# Patient Record
Sex: FEMALE | Race: WHITE | NOT HISPANIC OR LATINO | URBAN - METROPOLITAN AREA
[De-identification: names, ages, dates, MRNs, and addresses within clinical notes are randomized per-mention and may not be internally consistent; named-entity substitution may affect disease eponyms.]

---

## 2017-10-13 ENCOUNTER — EMERGENCY (EMERGENCY)
Facility: HOSPITAL | Age: 72
LOS: 1 days | Discharge: LEFT BEFORE DISCHARGE | End: 2017-10-13
Attending: EMERGENCY MEDICINE | Admitting: EMERGENCY MEDICINE
Payer: MEDICARE

## 2017-10-13 VITALS
SYSTOLIC BLOOD PRESSURE: 244 MMHG | OXYGEN SATURATION: 98 % | DIASTOLIC BLOOD PRESSURE: 110 MMHG | RESPIRATION RATE: 18 BRPM | HEART RATE: 50 BPM

## 2017-10-13 VITALS — WEIGHT: 179.9 LBS | HEIGHT: 67 IN

## 2017-10-13 PROCEDURE — 99284 EMERGENCY DEPT VISIT MOD MDM: CPT | Mod: 25

## 2017-10-13 PROCEDURE — 93005 ELECTROCARDIOGRAM TRACING: CPT

## 2017-10-13 PROCEDURE — 93010 ELECTROCARDIOGRAM REPORT: CPT

## 2017-10-13 RX ORDER — SODIUM CHLORIDE 9 MG/ML
3 INJECTION INTRAMUSCULAR; INTRAVENOUS; SUBCUTANEOUS ONCE
Qty: 0 | Refills: 0 | Status: COMPLETED | OUTPATIENT
Start: 2017-10-13 | End: 2017-10-13

## 2017-10-13 NOTE — ED ADULT NURSE NOTE - OBJECTIVE STATEMENT
72 year old female a&ox3 sent from Dr. Avendano at [facility] for high blood pressure. as per pt. she has not taken her bp for x2 weeks because as per her oncologist th 72 year old female a&ox3 sent from Dr. Avendano at [facility] for hypertension as per pt. she has not taken her bp for x2 weeks because as per her oncologist the medications mess up her kidney function. pt. has lung cancer. pt. denies pain or discomfort at this grisel. pt. denies sob, palpitations. pt. refuses iv, blood work, pt. states she did take  levothryoxin and hydralazine while she was here. MD. Jarquin called to bedside. as per MD. we will keep her and monitor her for awhile

## 2017-10-13 NOTE — ED PROVIDER NOTE - PROGRESS NOTE DETAILS
pt refused blood worjk and did not want any meds and will out against medical advice. pt understand the risks of her discharge including death, heart disease and strroke and is taking responsibility for her discharge. pt's  was presents

## 2017-10-13 NOTE — ED STATDOCS - PROGRESS NOTE DETAILS
71 yo F, with hx of metastatic lung CA, presents to ED c/o asymptomatic elevated BP. Pt was at cardiologist for routine visit and was noted to have elevated BP. Denies chest pain, SOB, palpitations, nausea, vomiting, visual changes, and weakness. Focused eval, protocol orders entered. Pt to be moved to main ED for complete evaluation by another provider. 71 yo F, with hx of metastatic lung CA, presents to ED c/o asymptomatic elevated BP. Pt was at cardiologist Dr. Avendano for routine visit and was noted to have elevated BP= systolic 250. Denies chest pain, SOB, palpitations, nausea, vomiting, visual changes, and weakness. Pt states she stopped taking her antihypertensives on her own after finding out her kidney functions were elevated. Denies seeing a nephrologist. Focused eval, protocol orders entered. Pt to be moved to main ED for complete evaluation by another provider.

## 2017-10-13 NOTE — ED PROVIDER NOTE - OBJECTIVE STATEMENT
72 year old female with a h/o htp presents with elevated blood pressure, pt went to see her cardiologist today and was notd to have a systolic blood pressure of 240. pt stopped taking her meds on her own a few days ago after she was told her kidney function was abnormal. pt took 20 mg of hydralizine on the way her

## 2017-10-13 NOTE — ED ADULT NURSE NOTE - PMH
Anxiety    Carcinoma  metastic stage 4 with stephanie to the iliac bone, colon, and lung  Hypertension    Hypothyroid    Iliac crest bone pain  cancer, right

## 2019-02-08 ENCOUNTER — INPATIENT (INPATIENT)
Facility: HOSPITAL | Age: 74
LOS: 8 days | Discharge: HOME | DRG: 246 | End: 2019-02-17
Attending: INTERNAL MEDICINE | Admitting: HOSPITALIST
Payer: MEDICARE

## 2019-02-08 VITALS
WEIGHT: 190.04 LBS | RESPIRATION RATE: 22 BRPM | HEIGHT: 67 IN | SYSTOLIC BLOOD PRESSURE: 170 MMHG | DIASTOLIC BLOOD PRESSURE: 91 MMHG

## 2019-02-08 DIAGNOSIS — I21.4 NON-ST ELEVATION (NSTEMI) MYOCARDIAL INFARCTION: ICD-10-CM

## 2019-02-08 DIAGNOSIS — Z71.89 OTHER SPECIFIED COUNSELING: ICD-10-CM

## 2019-02-08 DIAGNOSIS — J96.01 ACUTE RESPIRATORY FAILURE WITH HYPOXIA: ICD-10-CM

## 2019-02-08 DIAGNOSIS — C34.90 MALIGNANT NEOPLASM OF UNSPECIFIED PART OF UNSPECIFIED BRONCHUS OR LUNG: ICD-10-CM

## 2019-02-08 DIAGNOSIS — J81.0 ACUTE PULMONARY EDEMA: ICD-10-CM

## 2019-02-08 DIAGNOSIS — E03.9 HYPOTHYROIDISM, UNSPECIFIED: ICD-10-CM

## 2019-02-08 LAB
ALBUMIN SERPL ELPH-MCNC: 3.8 G/DL — SIGNIFICANT CHANGE UP (ref 3.3–5.2)
ALP SERPL-CCNC: 87 U/L — SIGNIFICANT CHANGE UP (ref 40–120)
ALT FLD-CCNC: 27 U/L — SIGNIFICANT CHANGE UP
ANION GAP SERPL CALC-SCNC: 11 MMOL/L — SIGNIFICANT CHANGE UP (ref 5–17)
ANION GAP SERPL CALC-SCNC: 12 MMOL/L — SIGNIFICANT CHANGE UP (ref 5–17)
APTT BLD: 28.6 SEC — SIGNIFICANT CHANGE UP (ref 27.5–36.3)
AST SERPL-CCNC: 162 U/L — HIGH
BASOPHILS # BLD AUTO: 0 K/UL — SIGNIFICANT CHANGE UP (ref 0–0.2)
BASOPHILS NFR BLD AUTO: 0.1 % — SIGNIFICANT CHANGE UP (ref 0–2)
BILIRUB SERPL-MCNC: 0.5 MG/DL — SIGNIFICANT CHANGE UP (ref 0.4–2)
BUN SERPL-MCNC: 25 MG/DL — HIGH (ref 8–20)
BUN SERPL-MCNC: 26 MG/DL — HIGH (ref 8–20)
CALCIUM SERPL-MCNC: 9.3 MG/DL — SIGNIFICANT CHANGE UP (ref 8.6–10.2)
CALCIUM SERPL-MCNC: 9.9 MG/DL — SIGNIFICANT CHANGE UP (ref 8.6–10.2)
CHLORIDE SERPL-SCNC: 100 MMOL/L — SIGNIFICANT CHANGE UP (ref 98–107)
CHLORIDE SERPL-SCNC: 101 MMOL/L — SIGNIFICANT CHANGE UP (ref 98–107)
CK MB CFR SERPL CALC: 96.9 NG/ML — HIGH (ref 0–6.7)
CK SERPL-CCNC: 2823 U/L — HIGH (ref 25–170)
CO2 SERPL-SCNC: 21 MMOL/L — LOW (ref 22–29)
CO2 SERPL-SCNC: 23 MMOL/L — SIGNIFICANT CHANGE UP (ref 22–29)
CREAT SERPL-MCNC: 1.05 MG/DL — SIGNIFICANT CHANGE UP (ref 0.5–1.3)
CREAT SERPL-MCNC: 1.09 MG/DL — SIGNIFICANT CHANGE UP (ref 0.5–1.3)
EOSINOPHIL # BLD AUTO: 0 K/UL — SIGNIFICANT CHANGE UP (ref 0–0.5)
EOSINOPHIL NFR BLD AUTO: 0.1 % — SIGNIFICANT CHANGE UP (ref 0–6)
GLUCOSE SERPL-MCNC: 128 MG/DL — HIGH (ref 70–115)
GLUCOSE SERPL-MCNC: 149 MG/DL — HIGH (ref 70–115)
HCT VFR BLD CALC: 41.5 % — SIGNIFICANT CHANGE UP (ref 37–47)
HGB BLD-MCNC: 13.5 G/DL — SIGNIFICANT CHANGE UP (ref 12–16)
INR BLD: 1.04 RATIO — SIGNIFICANT CHANGE UP (ref 0.88–1.16)
LIDOCAIN IGE QN: 13 U/L — LOW (ref 22–51)
LYMPHOCYTES # BLD AUTO: 0.8 K/UL — LOW (ref 1–4.8)
LYMPHOCYTES # BLD AUTO: 5.5 % — LOW (ref 20–55)
MCHC RBC-ENTMCNC: 29.3 PG — SIGNIFICANT CHANGE UP (ref 27–31)
MCHC RBC-ENTMCNC: 32.5 G/DL — SIGNIFICANT CHANGE UP (ref 32–36)
MCV RBC AUTO: 90 FL — SIGNIFICANT CHANGE UP (ref 81–99)
MONOCYTES # BLD AUTO: 1.1 K/UL — HIGH (ref 0–0.8)
MONOCYTES NFR BLD AUTO: 7.4 % — SIGNIFICANT CHANGE UP (ref 3–10)
NEUTROPHILS # BLD AUTO: 12.8 K/UL — HIGH (ref 1.8–8)
NEUTROPHILS NFR BLD AUTO: 86.4 % — HIGH (ref 37–73)
PLATELET # BLD AUTO: 331 K/UL — SIGNIFICANT CHANGE UP (ref 150–400)
POTASSIUM SERPL-MCNC: 5.1 MMOL/L — SIGNIFICANT CHANGE UP (ref 3.5–5.3)
POTASSIUM SERPL-MCNC: 5.5 MMOL/L — HIGH (ref 3.5–5.3)
POTASSIUM SERPL-SCNC: 5.1 MMOL/L — SIGNIFICANT CHANGE UP (ref 3.5–5.3)
POTASSIUM SERPL-SCNC: 5.5 MMOL/L — HIGH (ref 3.5–5.3)
PROT SERPL-MCNC: 7.9 G/DL — SIGNIFICANT CHANGE UP (ref 6.6–8.7)
PROTHROM AB SERPL-ACNC: 12 SEC — SIGNIFICANT CHANGE UP (ref 10–12.9)
RBC # BLD: 4.61 M/UL — SIGNIFICANT CHANGE UP (ref 4.4–5.2)
RBC # FLD: 15.6 % — SIGNIFICANT CHANGE UP (ref 11–15.6)
SODIUM SERPL-SCNC: 133 MMOL/L — LOW (ref 135–145)
SODIUM SERPL-SCNC: 135 MMOL/L — SIGNIFICANT CHANGE UP (ref 135–145)
TROPONIN T SERPL-MCNC: 2.04 NG/ML — HIGH (ref 0–0.06)
WBC # BLD: 14.8 K/UL — HIGH (ref 4.8–10.8)
WBC # FLD AUTO: 14.8 K/UL — HIGH (ref 4.8–10.8)

## 2019-02-08 PROCEDURE — 99291 CRITICAL CARE FIRST HOUR: CPT

## 2019-02-08 PROCEDURE — 71045 X-RAY EXAM CHEST 1 VIEW: CPT | Mod: 26

## 2019-02-08 PROCEDURE — 71275 CT ANGIOGRAPHY CHEST: CPT | Mod: 26

## 2019-02-08 PROCEDURE — 93010 ELECTROCARDIOGRAM REPORT: CPT

## 2019-02-08 PROCEDURE — 99223 1ST HOSP IP/OBS HIGH 75: CPT

## 2019-02-08 PROCEDURE — 72110 X-RAY EXAM L-2 SPINE 4/>VWS: CPT | Mod: 26

## 2019-02-08 PROCEDURE — 74174 CTA ABD&PLVS W/CONTRAST: CPT | Mod: 26

## 2019-02-08 PROCEDURE — 99285 EMERGENCY DEPT VISIT HI MDM: CPT

## 2019-02-08 RX ORDER — SERTRALINE 25 MG/1
100 TABLET, FILM COATED ORAL DAILY
Qty: 0 | Refills: 0 | Status: DISCONTINUED | OUTPATIENT
Start: 2019-02-08 | End: 2019-02-08

## 2019-02-08 RX ORDER — TICAGRELOR 90 MG/1
90 TABLET ORAL
Qty: 0 | Refills: 0 | Status: DISCONTINUED | OUTPATIENT
Start: 2019-02-08 | End: 2019-02-17

## 2019-02-08 RX ORDER — HYDROMORPHONE HYDROCHLORIDE 2 MG/ML
4 INJECTION INTRAMUSCULAR; INTRAVENOUS; SUBCUTANEOUS EVERY 6 HOURS
Qty: 0 | Refills: 0 | Status: DISCONTINUED | OUTPATIENT
Start: 2019-02-08 | End: 2019-02-12

## 2019-02-08 RX ORDER — FUROSEMIDE 40 MG
40 TABLET ORAL EVERY 12 HOURS
Qty: 0 | Refills: 0 | Status: DISCONTINUED | OUTPATIENT
Start: 2019-02-08 | End: 2019-02-10

## 2019-02-08 RX ORDER — NIFEDIPINE 30 MG
90 TABLET, EXTENDED RELEASE 24 HR ORAL DAILY
Qty: 0 | Refills: 0 | Status: DISCONTINUED | OUTPATIENT
Start: 2019-02-08 | End: 2019-02-12

## 2019-02-08 RX ORDER — HEPARIN SODIUM 5000 [USP'U]/ML
5300 INJECTION INTRAVENOUS; SUBCUTANEOUS EVERY 6 HOURS
Qty: 0 | Refills: 0 | Status: DISCONTINUED | OUTPATIENT
Start: 2019-02-08 | End: 2019-02-08

## 2019-02-08 RX ORDER — ASPIRIN/CALCIUM CARB/MAGNESIUM 324 MG
81 TABLET ORAL DAILY
Qty: 0 | Refills: 0 | Status: DISCONTINUED | OUTPATIENT
Start: 2019-02-08 | End: 2019-02-17

## 2019-02-08 RX ORDER — SENNA PLUS 8.6 MG/1
2 TABLET ORAL AT BEDTIME
Qty: 0 | Refills: 0 | Status: DISCONTINUED | OUTPATIENT
Start: 2019-02-08 | End: 2019-02-17

## 2019-02-08 RX ORDER — IPRATROPIUM/ALBUTEROL SULFATE 18-103MCG
3 AEROSOL WITH ADAPTER (GRAM) INHALATION EVERY 6 HOURS
Qty: 0 | Refills: 0 | Status: DISCONTINUED | OUTPATIENT
Start: 2019-02-08 | End: 2019-02-17

## 2019-02-08 RX ORDER — ONDANSETRON 8 MG/1
4 TABLET, FILM COATED ORAL ONCE
Qty: 0 | Refills: 0 | Status: COMPLETED | OUTPATIENT
Start: 2019-02-08 | End: 2019-02-08

## 2019-02-08 RX ORDER — FUROSEMIDE 40 MG
40 TABLET ORAL
Qty: 0 | Refills: 0 | Status: DISCONTINUED | OUTPATIENT
Start: 2019-02-08 | End: 2019-02-08

## 2019-02-08 RX ORDER — LIDOCAINE 4 G/100G
1 CREAM TOPICAL DAILY
Qty: 0 | Refills: 0 | Status: DISCONTINUED | OUTPATIENT
Start: 2019-02-08 | End: 2019-02-17

## 2019-02-08 RX ORDER — PANTOPRAZOLE SODIUM 20 MG/1
40 TABLET, DELAYED RELEASE ORAL
Qty: 0 | Refills: 0 | Status: DISCONTINUED | OUTPATIENT
Start: 2019-02-08 | End: 2019-02-17

## 2019-02-08 RX ORDER — SENNA PLUS 8.6 MG/1
2 TABLET ORAL AT BEDTIME
Qty: 0 | Refills: 0 | Status: DISCONTINUED | OUTPATIENT
Start: 2019-02-08 | End: 2019-02-08

## 2019-02-08 RX ORDER — SALICYLIC ACID 0.5 %
1 CLEANSER (GRAM) TOPICAL THREE TIMES A DAY
Qty: 0 | Refills: 0 | Status: DISCONTINUED | OUTPATIENT
Start: 2019-02-08 | End: 2019-02-17

## 2019-02-08 RX ORDER — NICOTINE POLACRILEX 2 MG
1 GUM BUCCAL DAILY
Qty: 0 | Refills: 0 | Status: DISCONTINUED | OUTPATIENT
Start: 2019-02-08 | End: 2019-02-11

## 2019-02-08 RX ORDER — HEPARIN SODIUM 5000 [USP'U]/ML
INJECTION INTRAVENOUS; SUBCUTANEOUS
Qty: 25000 | Refills: 0 | Status: DISCONTINUED | OUTPATIENT
Start: 2019-02-08 | End: 2019-02-08

## 2019-02-08 RX ORDER — TIOTROPIUM BROMIDE 18 UG/1
1 CAPSULE ORAL; RESPIRATORY (INHALATION) DAILY
Qty: 0 | Refills: 0 | Status: DISCONTINUED | OUTPATIENT
Start: 2019-02-08 | End: 2019-02-17

## 2019-02-08 RX ORDER — MORPHINE SULFATE 50 MG/1
4 CAPSULE, EXTENDED RELEASE ORAL ONCE
Qty: 0 | Refills: 0 | Status: DISCONTINUED | OUTPATIENT
Start: 2019-02-08 | End: 2019-02-08

## 2019-02-08 RX ORDER — ALBUTEROL 90 UG/1
1 AEROSOL, METERED ORAL EVERY 4 HOURS
Qty: 0 | Refills: 0 | Status: DISCONTINUED | OUTPATIENT
Start: 2019-02-08 | End: 2019-02-17

## 2019-02-08 RX ORDER — ASPIRIN/CALCIUM CARB/MAGNESIUM 324 MG
325 TABLET ORAL ONCE
Qty: 0 | Refills: 0 | Status: COMPLETED | OUTPATIENT
Start: 2019-02-08 | End: 2019-02-08

## 2019-02-08 RX ORDER — INFLUENZA VIRUS VACCINE 15; 15; 15; 15 UG/.5ML; UG/.5ML; UG/.5ML; UG/.5ML
0.5 SUSPENSION INTRAMUSCULAR ONCE
Qty: 0 | Refills: 0 | Status: COMPLETED | OUTPATIENT
Start: 2019-02-08 | End: 2019-02-08

## 2019-02-08 RX ORDER — SODIUM CHLORIDE 9 MG/ML
500 INJECTION INTRAMUSCULAR; INTRAVENOUS; SUBCUTANEOUS ONCE
Qty: 0 | Refills: 0 | Status: COMPLETED | OUTPATIENT
Start: 2019-02-08 | End: 2019-02-08

## 2019-02-08 RX ORDER — LEVOTHYROXINE SODIUM 125 MCG
125 TABLET ORAL DAILY
Qty: 0 | Refills: 0 | Status: DISCONTINUED | OUTPATIENT
Start: 2019-02-08 | End: 2019-02-11

## 2019-02-08 RX ORDER — ALPRAZOLAM 0.25 MG
0.25 TABLET ORAL THREE TIMES A DAY
Qty: 0 | Refills: 0 | Status: DISCONTINUED | OUTPATIENT
Start: 2019-02-08 | End: 2019-02-12

## 2019-02-08 RX ORDER — LOSARTAN POTASSIUM 100 MG/1
50 TABLET, FILM COATED ORAL DAILY
Qty: 0 | Refills: 0 | Status: DISCONTINUED | OUTPATIENT
Start: 2019-02-08 | End: 2019-02-11

## 2019-02-08 RX ORDER — DULOXETINE HYDROCHLORIDE 30 MG/1
30 CAPSULE, DELAYED RELEASE ORAL DAILY
Qty: 0 | Refills: 0 | Status: DISCONTINUED | OUTPATIENT
Start: 2019-02-08 | End: 2019-02-08

## 2019-02-08 RX ORDER — HYDRALAZINE HCL 50 MG
10 TABLET ORAL
Qty: 0 | Refills: 0 | Status: DISCONTINUED | OUTPATIENT
Start: 2019-02-08 | End: 2019-02-10

## 2019-02-08 RX ORDER — ATORVASTATIN CALCIUM 80 MG/1
40 TABLET, FILM COATED ORAL AT BEDTIME
Qty: 0 | Refills: 0 | Status: DISCONTINUED | OUTPATIENT
Start: 2019-02-08 | End: 2019-02-17

## 2019-02-08 RX ORDER — CYCLOBENZAPRINE HYDROCHLORIDE 10 MG/1
10 TABLET, FILM COATED ORAL THREE TIMES A DAY
Qty: 0 | Refills: 0 | Status: DISCONTINUED | OUTPATIENT
Start: 2019-02-08 | End: 2019-02-17

## 2019-02-08 RX ORDER — ONDANSETRON 8 MG/1
4 TABLET, FILM COATED ORAL EVERY 8 HOURS
Qty: 0 | Refills: 0 | Status: DISCONTINUED | OUTPATIENT
Start: 2019-02-08 | End: 2019-02-17

## 2019-02-08 RX ORDER — HEPARIN SODIUM 5000 [USP'U]/ML
5000 INJECTION INTRAVENOUS; SUBCUTANEOUS ONCE
Qty: 0 | Refills: 0 | Status: COMPLETED | OUTPATIENT
Start: 2019-02-08 | End: 2019-02-08

## 2019-02-08 RX ORDER — FUROSEMIDE 40 MG
40 TABLET ORAL ONCE
Qty: 0 | Refills: 0 | Status: COMPLETED | OUTPATIENT
Start: 2019-02-08 | End: 2019-02-08

## 2019-02-08 RX ORDER — ASPIRIN/CALCIUM CARB/MAGNESIUM 324 MG
300 TABLET ORAL ONCE
Qty: 0 | Refills: 0 | Status: COMPLETED | OUTPATIENT
Start: 2019-02-08 | End: 2019-02-08

## 2019-02-08 RX ADMIN — MORPHINE SULFATE 4 MILLIGRAM(S): 50 CAPSULE, EXTENDED RELEASE ORAL at 16:39

## 2019-02-08 RX ADMIN — HEPARIN SODIUM 1000 UNIT(S)/HR: 5000 INJECTION INTRAVENOUS; SUBCUTANEOUS at 16:03

## 2019-02-08 RX ADMIN — SENNA PLUS 2 TABLET(S): 8.6 TABLET ORAL at 21:53

## 2019-02-08 RX ADMIN — ONDANSETRON 4 MILLIGRAM(S): 8 TABLET, FILM COATED ORAL at 12:27

## 2019-02-08 RX ADMIN — ATORVASTATIN CALCIUM 40 MILLIGRAM(S): 80 TABLET, FILM COATED ORAL at 21:53

## 2019-02-08 RX ADMIN — Medication 300 MILLIGRAM(S): at 19:30

## 2019-02-08 RX ADMIN — Medication 325 MILLIGRAM(S): at 20:59

## 2019-02-08 RX ADMIN — Medication 0.25 MILLIGRAM(S): at 21:53

## 2019-02-08 RX ADMIN — HEPARIN SODIUM 5000 UNIT(S): 5000 INJECTION INTRAVENOUS; SUBCUTANEOUS at 16:03

## 2019-02-08 RX ADMIN — ONDANSETRON 4 MILLIGRAM(S): 8 TABLET, FILM COATED ORAL at 16:39

## 2019-02-08 RX ADMIN — Medication 25 MILLIGRAM(S): at 21:53

## 2019-02-08 RX ADMIN — MORPHINE SULFATE 4 MILLIGRAM(S): 50 CAPSULE, EXTENDED RELEASE ORAL at 12:26

## 2019-02-08 RX ADMIN — Medication 40 MILLIGRAM(S): at 14:31

## 2019-02-08 RX ADMIN — SODIUM CHLORIDE 500 MILLILITER(S): 9 INJECTION INTRAMUSCULAR; INTRAVENOUS; SUBCUTANEOUS at 12:30

## 2019-02-08 NOTE — PROGRESS NOTE ADULT - SUBJECTIVE AND OBJECTIVE BOX
Nurse Practitioner Progress note:     INTERVAL HISTORY: 73 year old female p/w NSTEMI    MEDICATIONS:  furosemide   Injectable 40 milliGRAM(s) IV Push every 12 hours  hydrALAZINE 10 milliGRAM(s) Oral four times a day  losartan 50 milliGRAM(s) Oral daily  NIFEdipine XL 90 milliGRAM(s) Oral daily  ALBUTerol    90 MICROgram(s) HFA Inhaler 1 Puff(s) Inhalation every 4 hours  ALBUTerol/ipratropium for Nebulization 3 milliLiter(s) Nebulizer every 6 hours  tiotropium 18 MICROgram(s) Capsule 1 Capsule(s) Inhalation daily  ALPRAZolam 0.25 milliGRAM(s) Oral three times a day PRN  aspirin 325 milliGRAM(s) Oral Once  aspirin Suppository 300 milliGRAM(s) Rectal once  cyclobenzaprine 10 milliGRAM(s) Oral three times a day PRN  DULoxetine 30 milliGRAM(s) Oral daily  HYDROmorphone   Tablet 4 milliGRAM(s) Oral every 6 hours PRN  pregabalin 25 milliGRAM(s) Oral every 8 hours  sertraline 100 milliGRAM(s) Oral daily  pantoprazole    Tablet 40 milliGRAM(s) Oral before breakfast  senna 2 Tablet(s) Oral at bedtime PRN  levothyroxine 125 MICROGram(s) Oral daily  predniSONE   Tablet 5 milliGRAM(s) Oral daily  aspirin  chewable 81 milliGRAM(s) Oral daily  lidocaine   Patch 1 Patch Transdermal daily      TELEMETRY: NSR 70    T(C): 36.7 (02-08-19 @ 16:00), Max: 36.7 (02-08-19 @ 16:00)  HR: 68 (02-08-19 @ 17:56) (68 - 84)  BP: 130/70 (02-08-19 @ 17:56) (115/95 - 170/91)  RR: 16 (02-08-19 @ 17:56) (16 - 22)  SpO2: 99% (02-08-19 @ 17:56) (93% - 99%)  Wt(kg): --    PHYSICAL EXAM:  Appearance: Normal	  HEENT:   Normal oral mucosa, PERRL  Cardiovascular: Normal S1 S2, No JVD, No murmurs, No edema  Respiratory: On BiPap.   Lungs with rales/rhonchi/crackles  Psychiatry: A & O x 3, Mood & affect appropriate  Neurologic: Non-focal, A&O X3.  No neuro deficits  Procedure Site: Right radial band in place, right groin site with mynx closure device benign.  No bleeding/hematoma/ecchymosis. + palp     12 lead EKG:  	pending    LABS:	 	                          13.5   14.8  )-----------( 331      ( 08 Feb 2019 12:50 )             41.5     02-08    135  |  101  |  25.0<H>  ----------------------------<  128<H>  5.1   |  23.0  |  1.09    Ca    9.3      08 Feb 2019 17:46    TPro  7.9  /  Alb  3.8  /  TBili  0.5  /  DBili  x   /  AST  162<H>  /  ALT  27  /  AlkPhos  87  02-08      PROCEDURE RESULTS: S/P PCI with OSMAR X1 to Circ via right femoral and unsuccessful radial approach.  Milian placed in lab    Acute occlusion of proximal LCX and  of RCA  with left to right collaterals. The LCX was treated with  thrombectomy/PTCA/STENT (OSMAR-Resolute) with good result.    ASSESSMENT/PLAN: 	  -Transfer to MICU  -MD to MD report given to Dr. Lloyd  -Ana precautions  -Bedrest X 3 hrs  -D/C radial band in 1 hour  -Resume home meds  -Initiate ASA/Brilinta/statin  -Initiate Lasix 40 mg IV BID  -Follow up with Dr. Avila  -Check labs/EKG/site check in AM  -Please call with questions/concerns Nurse Practitioner Progress note:     INTERVAL HISTORY: 73 year old female p/w NSTEMI    MEDICATIONS:  furosemide   Injectable 40 milliGRAM(s) IV Push every 12 hours  hydrALAZINE 10 milliGRAM(s) Oral four times a day  losartan 50 milliGRAM(s) Oral daily  NIFEdipine XL 90 milliGRAM(s) Oral daily  ALBUTerol    90 MICROgram(s) HFA Inhaler 1 Puff(s) Inhalation every 4 hours  ALBUTerol/ipratropium for Nebulization 3 milliLiter(s) Nebulizer every 6 hours  tiotropium 18 MICROgram(s) Capsule 1 Capsule(s) Inhalation daily  ALPRAZolam 0.25 milliGRAM(s) Oral three times a day PRN  aspirin 325 milliGRAM(s) Oral Once  aspirin Suppository 300 milliGRAM(s) Rectal once  cyclobenzaprine 10 milliGRAM(s) Oral three times a day PRN  DULoxetine 30 milliGRAM(s) Oral daily  HYDROmorphone   Tablet 4 milliGRAM(s) Oral every 6 hours PRN  pregabalin 25 milliGRAM(s) Oral every 8 hours  sertraline 100 milliGRAM(s) Oral daily  pantoprazole    Tablet 40 milliGRAM(s) Oral before breakfast  senna 2 Tablet(s) Oral at bedtime PRN  levothyroxine 125 MICROGram(s) Oral daily  predniSONE   Tablet 5 milliGRAM(s) Oral daily  aspirin  chewable 81 milliGRAM(s) Oral daily  lidocaine   Patch 1 Patch Transdermal daily      TELEMETRY: NSR 70    T(C): 36.7 (02-08-19 @ 16:00), Max: 36.7 (02-08-19 @ 16:00)  HR: 68 (02-08-19 @ 17:56) (68 - 84)  BP: 130/70 (02-08-19 @ 17:56) (115/95 - 170/91)  RR: 16 (02-08-19 @ 17:56) (16 - 22)  SpO2: 99% (02-08-19 @ 17:56) (93% - 99%)  Wt(kg): --    PHYSICAL EXAM:  Appearance: Normal	  HEENT:   Normal oral mucosa, PERRL  Cardiovascular: Normal S1 S2, No JVD, No murmurs, No edema  Respiratory: On BiPap.   Lungs with rales/rhonchi/crackles  Psychiatry: A & O x 3, Mood & affect appropriate  Neurologic: Non-focal, A&O X3.  No neuro deficits  Procedure Site: Right radial band in place, right groin site with mynx closure device benign.  No bleeding/hematoma/ecchymosis. + palp     12 lead EKG:  	pending (will be viewed by MICU)    LABS:	 	                          13.5   14.8  )-----------( 331      ( 08 Feb 2019 12:50 )             41.5     02-08    135  |  101  |  25.0<H>  ----------------------------<  128<H>  5.1   |  23.0  |  1.09    Ca    9.3      08 Feb 2019 17:46    TPro  7.9  /  Alb  3.8  /  TBili  0.5  /  DBili  x   /  AST  162<H>  /  ALT  27  /  AlkPhos  87  02-08      PROCEDURE RESULTS: S/P PCI with OSMAR X1 to Circ via right femoral and unsuccessful radial approach.  Milian placed in lab    Acute occlusion of proximal LCX and  of RCA  with left to right collaterals. The LCX was treated with  thrombectomy/PTCA/STENT (OSMAR-Resolute) with good result.    ASSESSMENT/PLAN: 	  -Transfer to MICU  -MD to MD report given to Dr. Lloyd  -Groin precautions  -Bedrest X 3 hrs  -D/C radial band in 1 hour  -Resume home meds  -Initiate ASA/Brilinta/statin  -Initiate Lasix 40 mg IV BID  -Follow up with Dr. Avila  -Check labs/EKG/site check in AM  -Please call with questions/concerns

## 2019-02-08 NOTE — CONSULT NOTE ADULT - PROBLEM SELECTOR RECOMMENDATION 6
provided copy of living will/HCP which elect him and daughter Kiah as alternate. Per this document, pt wants no artificial life support, pt and family had discussed this and now request full code. Daughter states that pt is in denial of her diagnosis and thinks she is in remission. Will consult social work and case mgt for help at home for  to care for pt as well as involve palliative care.

## 2019-02-08 NOTE — CONSULT NOTE ADULT - SUBJECTIVE AND OBJECTIVE BOX
[facility]                                   [address] [place] [address]                                                      PHONE: [phone]                                                         FAX: [phone]  [email or web address]  ---------------------------------------------------------------------------------------------------------------------------------    HPI:  JOEY CHRISTOPHER is an 73y Female PMHX HTN, HLD, hypothyroidism, metastatic Lung CA (received chemo and XRT), former smoker, who presented to [facility] ED with lower back pain.  Pt has had progressively worsenign back pain.  He was found to have new nodules.  He had a troponin drawn which was positive.     PAST MEDICAL & SURGICAL HISTORY:  Carcinoma: metastic stage 4 with stephanie to the iliac bone, colon, and lung  Iliac crest bone pain: cancer, right  Anxiety  Hypothyroid  Hypertension  No significant past surgical history      No Known Allergies      MEDICATIONS  (STANDING):  furosemide   Injectable 40 milliGRAM(s) IV Push Once    MEDICATIONS  (PRN):      Family History: Pt denies hx of early cad, SCD, or congenital heart disease.      Social History:  Cigarettes:   former                 Alchohol:       no          Illicit Drug Abuse:  no    ROS:  all negative except for the aforementioned in the HPI.     Vital Signs Last 24 Hrs  T(C): --  T(F): --  HR: 80 (08 Feb 2019 12:23) (80 - 80)  BP: 164/100 (08 Feb 2019 12:23) (164/100 - 170/91)  BP(mean): --  RR: 22 (08 Feb 2019 14:20) (20 - 22)  SpO2: 98% (08 Feb 2019 14:22) (93% - 98%)  ICU Vital Signs Last 24 Hrs  JOEY CHRISTOPHER  I&O's Detail    I&O's Summary    Drug Dosing Weight  JOEY CHRISTOPHER      PHYSICAL EXAM:  General: Appears well developed, well nourished alert and cooperative.  HEENT: Head; normocephalic, atraumatic.  Eyes: Pupils reactive, cornea wnl.  Neck: Supple, no nodes adenopathy, no NVD or carotid bruit or thyromegaly.  CARDIOVASCULAR: Normal S1 and S2, No murmur, rub, gallop or lift.   LUNGS: No rales, rhonchi or wheeze. Normal breath sounds bilaterally.  ABDOMEN: Soft, nontender without mass or organomegaly. bowel sounds normoactive.  EXTREMITIES: No clubbing, cyanosis or edema. Distal pulses wnl.   SKIN: warm and dry with normal turgor.  NEURO: Alert/oriented x 3/normal motor exam. No pathologic reflexes.    PSYCH: normal affect.        LABS:                        13.5   14.8  )-----------( 331      ( 08 Feb 2019 12:50 )             41.5     02-08    133<L>  |  100  |  26.0<H>  ----------------------------<  149<H>  5.5<H>   |  21.0<L>  |  1.05    Ca    9.9      08 Feb 2019 12:50    TPro  7.9  /  Alb  3.8  /  TBili  0.5  /  DBili  x   /  AST  162<H>  /  ALT  27  /  AlkPhos  87  02-08    JOEY CHRISTOPHER  CARDIAC MARKERS ( 08 Feb 2019 12:50 )  x     / 2.04 ng/mL / 2823 U/L / x     / 96.9 ng/mL      PT/INR - ( 08 Feb 2019 12:50 )   PT: 12.0 sec;   INR: 1.04 ratio         PTT - ( 08 Feb 2019 12:50 )  PTT:28.6 sec      RADIOLOGY & ADDITIONAL STUDIES:    INTERPRETATION OF TELEMETRY (personally reviewed):    ECG:nsr, nml axis, st depressions ant/lateral leads     Assessment and Plan:  In summary, JOEY CHRISTOPHER is an 73y Female with past medical history significant for     NSTEMI:  + trops/ST depressions  -not a candidate for invasive procedures  -conservative management.   -trend trops/ekgs  -echo  -tele  -          Thank you for allowing [facility] to participate in the care of this patient.  Please feel free to call with any questions or concerns. [facility]                                   [address] [place] [address]                                                      PHONE: [phone]                                                         FAX: [phone]  [email or web address]  ---------------------------------------------------------------------------------------------------------------------------------    HPI:  JOEY CHRISTOPHER is an 73y Female PMHX HTN, HLD, hypothyroidism, metastatic Lung CA (received chemo and XRT), former smoker, who presented to [facility] ED with upperback pain.  Pt has had progressively worsening back pain for the past few days.  Her pain was b/l shoulders intermittently occurring  She went to the chiropractor today and had worsening of her symptoms.  In the ED she has new ST depression in the anterior/lateral leads with trop of 2.0.  She was in respiratory distress requiring bipap and lasix.  She does mention worsening sob/LE edema for the past few days.     PAST MEDICAL & SURGICAL HISTORY:  Carcinoma: metastic stage 4 with stephanie to the iliac bone, colon, and lung  Iliac crest bone pain: cancer, right  Anxiety  Hypothyroid  Hypertension  No significant past surgical history      No Known Allergies      MEDICATIONS  (STANDING):  furosemide   Injectable 40 milliGRAM(s) IV Push Once    MEDICATIONS  (PRN):      Family History: Pt denies hx of early cad, SCD, or congenital heart disease.      Social History:  Cigarettes:   former                 Alchohol:       no          Illicit Drug Abuse:  no    ROS:  all negative except for the aforementioned in the HPI.     Vital Signs Last 24 Hrs  T(C): --  T(F): --  HR: 80 (08 Feb 2019 12:23) (80 - 80)  BP: 164/100 (08 Feb 2019 12:23) (164/100 - 170/91)  BP(mean): --  RR: 22 (08 Feb 2019 14:20) (20 - 22)  SpO2: 98% (08 Feb 2019 14:22) (93% - 98%)  ICU Vital Signs Last 24 Hrs  JOEY CHRISTOPHER  I&O's Detail    I&O's Summary    Drug Dosing Weight  JOEY CHRISTOPHER      PHYSICAL EXAM:  General: Appears well developed, well nourished alert and cooperative. ON BIPAP laying flat   HEENT: Head; normocephalic, atraumatic.  Eyes: Pupils reactive, cornea wnl.  Neck: Supple, no nodes adenopathy,  +5 cm jVD or carotid bruit or thyromegaly.  CARDIOVASCULAR: Normal S1 and S2, No murmur, rub, gallop or lift.   LUNGS: No rales, rhonchi or wheeze. Normal breath sounds bilaterally.  ABDOMEN: Soft, nontender without mass or organomegaly. bowel sounds normoactive.  EXTREMITIES: No clubbing, cyanosis. Distal pulses wnl.  +edema   SKIN: warm and dry with normal turgor.  NEURO: Alert/oriented x 3/normal motor exam. No pathologic reflexes.    PSYCH: normal affect.        LABS:                        13.5   14.8  )-----------( 331      ( 08 Feb 2019 12:50 )             41.5     02-08    133<L>  |  100  |  26.0<H>  ----------------------------<  149<H>  5.5<H>   |  21.0<L>  |  1.05    Ca    9.9      08 Feb 2019 12:50    TPro  7.9  /  Alb  3.8  /  TBili  0.5  /  DBili  x   /  AST  162<H>  /  ALT  27  /  AlkPhos  87  02-08    JOEY CHRISTOPHER  CARDIAC MARKERS ( 08 Feb 2019 12:50 )  x     / 2.04 ng/mL / 2823 U/L / x     / 96.9 ng/mL      PT/INR - ( 08 Feb 2019 12:50 )   PT: 12.0 sec;   INR: 1.04 ratio         PTT - ( 08 Feb 2019 12:50 )  PTT:28.6 sec      RADIOLOGY & ADDITIONAL STUDIES:    INTERPRETATION OF TELEMETRY (personally reviewed):    ECG:nsr, nml axis, st depressions ant/lateral leads     Assessment and Plan:  In summary, JOEY CHRISTOPHER is an 73y Female PMHX HTN, HLD, hypothyroidism, metastatic Lung CA (received chemo and XRT), former smoker, who presented to [facility] ED with upperback pain.  Pt has had progressively worsening back pain for the past few days.  Her pain was b/l shoulders intermittently occurring  She went to the chiropractor today and had worsening of her symptoms.  In the ED she has new ST depression in the anterior/lateral leads with trop of 2.0.  She was in respiratory distress requiring bipap and lasix.  She does mention worsening sob/LE edema for the past few days.     I personally spoke to pts Oncologist Taj Wagner MD  [phone]  He states her prognosis is > 1 year and that there is no contraindication for AC (asa/plavix/heparin ) with her lung disease.   She did had a repeat ct demonstrating recurrent new nodules .  She has been off of chemo for 3 months.      Spoke to pt and family regarding code status - She is a FULL CODE - if she requires intubation for the procedure she is amenable.     NSTEMI:  -symptomatic   + trops/ST depressions   -trend trops/ekgs  -echo  -tele  -cardiac cath   -asa  -heparin gtt    Thank you for allowing [facility] to participate in the care of this patient.  Please feel free to call with any questions or concerns.

## 2019-02-08 NOTE — PROGRESS NOTE ADULT - SUBJECTIVE AND OBJECTIVE BOX
Cardiology NP    73 year old female with h/o metastatic ca who presents to ER N/V chest pain and NSTEMI.  Pt found to be in resp distress.  Lasix and Bipap started.  Heparin drip initiated.  For C to assess coronary arteries.   Pt with scattered rhonchi, rales and crackles.     ASA 3  Mallampati 2-3  Bleeding risk 2.3%

## 2019-02-08 NOTE — ED PROVIDER NOTE - OBJECTIVE STATEMENT
72 y/o F  with H/O HTN, LUNG CA s/p RT  and chemo and in remission for 5 months, ex smoker, new lung nodules, chronic back pain, iliac lesion from cancer p/w upper thoracic back pain for few days and now getting worse with nausea, vomiting, no cough , fever, chills. No sweating, weight loss. Pt is off oral chemo  now and went to chiropractor with manipulation and pain got worse

## 2019-02-08 NOTE — H&P ADULT - ASSESSMENT
73 yr old patient with known lung cancer and Htn and CAD and CHF and ES-COPD now with persistent back pain - found to have EKG changes of acute ischemia- cardiac cath today - patient already being taken up  multifactorial resp falure with ES COPD on home oxygen and lung cancer - on BIPAP- diuresis , lasix, ACEI   Hypothyroid- synthroid  HLD/CAD- cont aspirin statin  DVt PPX- lovenox   anxiety- xanax and home meds   Bone mets- narcotics for pain control

## 2019-02-08 NOTE — ED PROVIDER NOTE - PROGRESS NOTE DETAILS
Cruz GARCIA- pt had acute hypoxia and desaturated to 83% and plaecd in bipap, best sta o 5 L oxygen was 86% with rales+ Cruz GACRIA- pt doing well on bipap, saturating 98%, called MICU consult, called [facility] Cruz GARCIA- plan to take patient to cath lab and admitted to Dr. retana after speaking with her , cath lab awaiting the patient, micu consult will occur later after cath

## 2019-02-08 NOTE — H&P ADULT - HISTORY OF PRESENT ILLNESS
73 yr old patient with known lung cancer and Htn and CAD and CHF and ES-COPD now with persistent back pain - found to have EKG changes of acute ischemia in Ed - going for cath lab - on BIPAP, no fever , no phlegm, no

## 2019-02-08 NOTE — CONSULT NOTE ADULT - SUBJECTIVE AND OBJECTIVE BOX
Patient is a 73y old  Female who presents with a chief complaint of back pain and SOB (08 Feb 2019 19:12)      BRIEF HOSPITAL COURSE: 73y Female PMHX HTN, HLD, hypothyroidism, metastatic Lung CA (received chemo and XRT), former smoker, who presented to [facility] ED with upperback pain.  Pt has had progressively worsening back pain for the past few days.  Her pain was b/l shoulders intermittently occurring  She went to the chiropractor today and had worsening of her symptoms.  In the ED she has new ST depression in the anterior/lateral leads with trop of 2.0.  She was in respiratory distress requiring bipap and lasix.  She does mention worsening sob/LE edema for the past few days.       Events last 24 hours:     PAST MEDICAL & SURGICAL HISTORY:  Carcinoma: metastatic stage 4 with mets to the iliac bone, colon, and lung  Iliac crest bone pain: cancer, right  Anxiety  Hypothyroid  Hypertension  No significant past surgical history    Allergies    No Known Allergies    Intolerances      FAMILY HISTORY:  No pertinent family history in first degree relatives      Family history otherwise noncontributory.    Social History:   Review of Systems:    ALL OTHER REVIEW OF SYSTEMS EXCEPT PER HPI NEGATIVE.      Medications:    furosemide   Injectable 40 milliGRAM(s) IV Push every 12 hours  furosemide   Injectable 40 milliGRAM(s) IV Push two times a day  hydrALAZINE 10 milliGRAM(s) Oral four times a day  losartan 50 milliGRAM(s) Oral daily  NIFEdipine XL 90 milliGRAM(s) Oral daily    ALBUTerol    90 MICROgram(s) HFA Inhaler 1 Puff(s) Inhalation every 4 hours  ALBUTerol/ipratropium for Nebulization 3 milliLiter(s) Nebulizer every 6 hours  tiotropium 18 MICROgram(s) Capsule 1 Capsule(s) Inhalation daily    ALPRAZolam 0.25 milliGRAM(s) Oral three times a day PRN  aspirin 325 milliGRAM(s) Oral Once  aspirin Suppository 300 milliGRAM(s) Rectal once  cyclobenzaprine 10 milliGRAM(s) Oral three times a day PRN  DULoxetine 30 milliGRAM(s) Oral daily  HYDROmorphone   Tablet 4 milliGRAM(s) Oral every 6 hours PRN  ondansetron Injectable 4 milliGRAM(s) IV Push every 8 hours PRN  pregabalin 25 milliGRAM(s) Oral every 8 hours  sertraline 100 milliGRAM(s) Oral daily      aspirin  chewable 81 milliGRAM(s) Oral daily  ticagrelor 90 milliGRAM(s) Oral two times a day    pantoprazole    Tablet 40 milliGRAM(s) Oral before breakfast  senna 2 Tablet(s) Oral at bedtime PRN      atorvastatin 40 milliGRAM(s) Oral at bedtime  levothyroxine 125 MICROGram(s) Oral daily  predniSONE   Tablet 5 milliGRAM(s) Oral daily        lidocaine   Patch 1 Patch Transdermal daily    nicotine -  14 mG/24Hr(s) Patch 1 patch Transdermal daily          ICU Vital Signs Last 24 Hrs  T(C): 36.7 (08 Feb 2019 16:00), Max: 36.7 (08 Feb 2019 16:00)  T(F): 98 (08 Feb 2019 16:00), Max: 98 (08 Feb 2019 16:00)  HR: 67 (08 Feb 2019 20:00) (67 - 84)  BP: 124/74 (08 Feb 2019 20:00) (115/95 - 170/91)  BP(mean): 92 (08 Feb 2019 20:00) (92 - 92)  ABP: --  ABP(mean): --  RR: 23 (08 Feb 2019 20:00) (16 - 23)  SpO2: 95% (08 Feb 2019 20:00) (93% - 99%)    Vital Signs Last 24 Hrs  T(C): 36.7 (08 Feb 2019 16:00), Max: 36.7 (08 Feb 2019 16:00)  T(F): 98 (08 Feb 2019 16:00), Max: 98 (08 Feb 2019 16:00)  HR: 67 (08 Feb 2019 20:00) (67 - 84)  BP: 124/74 (08 Feb 2019 20:00) (115/95 - 170/91)  BP(mean): 92 (08 Feb 2019 20:00) (92 - 92)  RR: 23 (08 Feb 2019 20:00) (16 - 23)  SpO2: 95% (08 Feb 2019 20:00) (93% - 99%)        I&O's Detail        LABS:                        13.5   14.8  )-----------( 331      ( 08 Feb 2019 12:50 )             41.5     02-08    135  |  101  |  25.0<H>  ----------------------------<  128<H>  5.1   |  23.0  |  1.09    Ca    9.3      08 Feb 2019 17:46    TPro  7.9  /  Alb  3.8  /  TBili  0.5  /  DBili  x   /  AST  162<H>  /  ALT  27  /  AlkPhos  87  02-08      CARDIAC MARKERS ( 08 Feb 2019 12:50 )  x     / 2.04 ng/mL / 2823 U/L / x     / 96.9 ng/mL      CAPILLARY BLOOD GLUCOSE        PT/INR - ( 08 Feb 2019 12:50 )   PT: 12.0 sec;   INR: 1.04 ratio         PTT - ( 08 Feb 2019 12:50 )  PTT:28.6 sec    CULTURES:      Physical Examination:    GENERAL: No acute distress.      EYES: Pupils equal, reactive to light.  Symmetric.    EARS, NOSE, THROAT: Normal; supple neck, no lymphadenopathy; trachea midline    PULM: Coarse rales to auscultation bilaterally, no significant sputum production    CVS: Regular rate and rhythm, no murmurs, rubs, or gallops    GI: Soft, nondistended, nontender, normoactive bowel sounds, no masses, no guarding    EXTREMITIES: ++ edema, right groin site soft c./d/i no hematoma formation    SKIN: Warm and well perfused, no rashes noted.    NEURO: Alert, oriented, interactive, nonfocal    DEVICES:     RADIOLOGY:   < from: CT Angio Abdomen and Pelvis w/ IV Cont (02.08.19 @ 14:12) >  FINDINGS:    There are atherosclerotic changes involving the thoracic aorta. No   evidence of aortic aneurysm or dissection. Atherosclerotic changes   involving the abdominal aorta. No evidence of dissection. 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 Aneurysm is   partially filled with thrombus. No evidence for obstruction of the   external iliac arteries were from her arteries.    The celiac artery, superior mesenteric artery, and inferior mesenteric   artery are patent. There is a single renal artery on each side, patent.    An aberrant right subclavian artery is demonstrated, a congenital   variant. Precontrast images imaging no evidence of intramural hematoma of   the thoracic or upper abdominal aorta.    No evidence of mediastinal or hilar lymphadenopathy. There are small   bilateral pleural effusions, right larger than left. No evidence of a   pericardial effusion. No evidence of axillary adenopathy. No evidence of   pulmonary emboli.    The left lung is clear. There is a sharply circumscribed noncalcified   nodule in the anterior aspect of the right lower lobe within the right   lateral costophrenic angle. Thisabnormality is unchanged in size and   appearance since 12/19/2015, measuring 1.5 x 1.2 cm compared with 1.6 x   1.3 cm on the prior examination.    Intra-abdominal structures are evaluated during the arterial phase which   is suboptimal.    The liver is unremarkable. Calcifications noted in the wall of the   gallbladder, unchanged from the prior study.    The spleen is not enlarged and demonstrates no focal abnormality. The   pancreatic contour is unremarkable without evidence of mass, inflammation   or ductal dilatation. The adrenal glands demonstrate normal size and   contour.    The right kidney demonstrates a cortical cyst but no evidence of   hydronephrosis. The left kidney is unremarkable.    No evidence of retroperitoneal or pelvic lymphadenopathy.    The bladder, uterus, and adnexal regions demonstrate no abnormality.    Mild colonic diverticulosis. No evidence of diverticulitis. The appendix   is visualized and is normal.    There is a lucency involving the anterior right iliac wing. This   represents the residua of a frankly destructive osseous lesion at this   site on a prior CT of 7/10/2015. There are degenerative changes in the   spine.    IMPRESSION:     No evidence of aortic dissection or aneurysm.    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    Sharply circumscribed noncalcified nodule in the right lower lobe   demonstrating no change in size or appearance since 12/19/2015.    Calcifications noted in the wall of the gallbladder, unchanged.    Lucency in the right iliac wing representing the residua of a destructive   lesion seen at this site on 7/10/2015.                 TRANG NUNO M.D., ATTENDING RADIOLOGIST  This document has been electronically signed. Feb 8 2019  2:34PM        < end of copied text >    CRITICAL CARE TIME SPENT: *** Patient is a 73y old  Female who presents with a chief complaint of back pain and SOB (08 Feb 2019 19:12)      BRIEF HOSPITAL COURSE: 73y Female PMHX HTN, HLD, hypothyroidism, metastatic Lung CA (received chemo and XRT), former smoker, who presented to [facility] ED with upperback pain.  Pt has had progressively worsening back pain for the past few days.  Her pain was b/l shoulders intermittently occurring  She went to the chiropractor today and had worsening of her symptoms.  In the ED she has new ST depression in the anterior/lateral leads with trop of 2.0.  She was in respiratory distress requiring bipap and lasix.  She does mention worsening sob/LE edema for the past few days.       Events last 24 hours: pt taken to cath lab 1 OSMAR placed to circumflex    PAST MEDICAL & SURGICAL HISTORY:  Carcinoma: metastatic stage 4 with mets to the iliac bone, colon, and lung  Iliac crest bone pain: cancer, right  Anxiety  Hypothyroid  Hypertension  No significant past surgical history    Allergies    No Known Allergies    Intolerances      FAMILY HISTORY:  No pertinent family history in first degree relatives      Family history otherwise noncontributory.    Social History: per  pt noncompliant with medications especially lasix and oxygen, uses rolling walker   Review of Systems:    ALL OTHER REVIEW OF SYSTEMS EXCEPT PER HPI NEGATIVE.      Medications:    furosemide   Injectable 40 milliGRAM(s) IV Push every 12 hours  furosemide   Injectable 40 milliGRAM(s) IV Push two times a day  hydrALAZINE 10 milliGRAM(s) Oral four times a day  losartan 50 milliGRAM(s) Oral daily  NIFEdipine XL 90 milliGRAM(s) Oral daily    ALBUTerol    90 MICROgram(s) HFA Inhaler 1 Puff(s) Inhalation every 4 hours  ALBUTerol/ipratropium for Nebulization 3 milliLiter(s) Nebulizer every 6 hours  tiotropium 18 MICROgram(s) Capsule 1 Capsule(s) Inhalation daily    ALPRAZolam 0.25 milliGRAM(s) Oral three times a day PRN  aspirin 325 milliGRAM(s) Oral Once  aspirin Suppository 300 milliGRAM(s) Rectal once  cyclobenzaprine 10 milliGRAM(s) Oral three times a day PRN  DULoxetine 30 milliGRAM(s) Oral daily  HYDROmorphone   Tablet 4 milliGRAM(s) Oral every 6 hours PRN  ondansetron Injectable 4 milliGRAM(s) IV Push every 8 hours PRN  pregabalin 25 milliGRAM(s) Oral every 8 hours  sertraline 100 milliGRAM(s) Oral daily      aspirin  chewable 81 milliGRAM(s) Oral daily  ticagrelor 90 milliGRAM(s) Oral two times a day    pantoprazole    Tablet 40 milliGRAM(s) Oral before breakfast  senna 2 Tablet(s) Oral at bedtime PRN      atorvastatin 40 milliGRAM(s) Oral at bedtime  levothyroxine 125 MICROGram(s) Oral daily  predniSONE   Tablet 5 milliGRAM(s) Oral daily        lidocaine   Patch 1 Patch Transdermal daily    nicotine -  14 mG/24Hr(s) Patch 1 patch Transdermal daily          ICU Vital Signs Last 24 Hrs  T(C): 36.7 (08 Feb 2019 16:00), Max: 36.7 (08 Feb 2019 16:00)  T(F): 98 (08 Feb 2019 16:00), Max: 98 (08 Feb 2019 16:00)  HR: 67 (08 Feb 2019 20:00) (67 - 84)  BP: 124/74 (08 Feb 2019 20:00) (115/95 - 170/91)  BP(mean): 92 (08 Feb 2019 20:00) (92 - 92)  ABP: --  ABP(mean): --  RR: 23 (08 Feb 2019 20:00) (16 - 23)  SpO2: 95% (08 Feb 2019 20:00) (93% - 99%)    Vital Signs Last 24 Hrs  T(C): 36.7 (08 Feb 2019 16:00), Max: 36.7 (08 Feb 2019 16:00)  T(F): 98 (08 Feb 2019 16:00), Max: 98 (08 Feb 2019 16:00)  HR: 67 (08 Feb 2019 20:00) (67 - 84)  BP: 124/74 (08 Feb 2019 20:00) (115/95 - 170/91)  BP(mean): 92 (08 Feb 2019 20:00) (92 - 92)  RR: 23 (08 Feb 2019 20:00) (16 - 23)  SpO2: 95% (08 Feb 2019 20:00) (93% - 99%)        I&O's Detail        LABS:                        13.5   14.8  )-----------( 331      ( 08 Feb 2019 12:50 )             41.5     02-08    135  |  101  |  25.0<H>  ----------------------------<  128<H>  5.1   |  23.0  |  1.09    Ca    9.3      08 Feb 2019 17:46    TPro  7.9  /  Alb  3.8  /  TBili  0.5  /  DBili  x   /  AST  162<H>  /  ALT  27  /  AlkPhos  87  02-08      CARDIAC MARKERS ( 08 Feb 2019 12:50 )  x     / 2.04 ng/mL / 2823 U/L / x     / 96.9 ng/mL      CAPILLARY BLOOD GLUCOSE        PT/INR - ( 08 Feb 2019 12:50 )   PT: 12.0 sec;   INR: 1.04 ratio         PTT - ( 08 Feb 2019 12:50 )  PTT:28.6 sec    CULTURES:      Physical Examination:    GENERAL: No acute distress.      EYES: Pupils equal, reactive to light.  Symmetric.    EARS, NOSE, THROAT: Normal; supple neck, no lymphadenopathy; trachea midline    PULM: Coarse rales to auscultation bilaterally, no significant sputum production    CVS: Regular rate and rhythm, no murmurs, rubs, or gallops    GI: Soft, nondistended, nontender, normoactive bowel sounds, no masses, no guarding    EXTREMITIES: ++ edema, right groin site soft c./d/i no hematoma formation    SKIN: Warm and well perfused, no rashes noted.    NEURO: Alert, oriented, interactive, nonfocal    DEVICES: gallegos (2/8/19)    RADIOLOGY:   < from: CT Angio Abdomen and Pelvis w/ IV Cont (02.08.19 @ 14:12) >  FINDINGS:    There are atherosclerotic changes involving the thoracic aorta. No   evidence of aortic aneurysm or dissection. Atherosclerotic changes   involving the abdominal aorta. No evidence of dissection. 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 Aneurysm is   partially filled with thrombus. No evidence for obstruction of the   external iliac arteries were from her arteries.    The celiac artery, superior mesenteric artery, and inferior mesenteric   artery are patent. There is a single renal artery on each side, patent.    An aberrant right subclavian artery is demonstrated, a congenital   variant. Precontrast images imaging no evidence of intramural hematoma of   the thoracic or upper abdominal aorta.    No evidence of mediastinal or hilar lymphadenopathy. There are small   bilateral pleural effusions, right larger than left. No evidence of a   pericardial effusion. No evidence of axillary adenopathy. No evidence of   pulmonary emboli.    The left lung is clear. There is a sharply circumscribed noncalcified   nodule in the anterior aspect of the right lower lobe within the right   lateral costophrenic angle. Thisabnormality is unchanged in size and   appearance since 12/19/2015, measuring 1.5 x 1.2 cm compared with 1.6 x   1.3 cm on the prior examination.    Intra-abdominal structures are evaluated during the arterial phase which   is suboptimal.    The liver is unremarkable. Calcifications noted in the wall of the   gallbladder, unchanged from the prior study.    The spleen is not enlarged and demonstrates no focal abnormality. The   pancreatic contour is unremarkable without evidence of mass, inflammation   or ductal dilatation. The adrenal glands demonstrate normal size and   contour.    The right kidney demonstrates a cortical cyst but no evidence of   hydronephrosis. The left kidney is unremarkable.    No evidence of retroperitoneal or pelvic lymphadenopathy.    The bladder, uterus, and adnexal regions demonstrate no abnormality.    Mild colonic diverticulosis. No evidence of diverticulitis. The appendix   is visualized and is normal.    There is a lucency involving the anterior right iliac wing. This   represents the residua of a frankly destructive osseous lesion at this   site on a prior CT of 7/10/2015. There are degenerative changes in the   spine.    IMPRESSION:     No evidence of aortic dissection or aneurysm.    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    Sharply circumscribed noncalcified nodule in the right lower lobe   demonstrating no change in size or appearance since 12/19/2015.    Calcifications noted in the wall of the gallbladder, unchanged.    Lucency in the right iliac wing representing the residua of a destructive   lesion seen at this site on 7/10/2015.                 TRANG NUNO M.D., ATTENDING RADIOLOGIST  This document has been electronically signed. Feb 8 2019  2:34PM        < end of copied text >  < from: Cardiac Cath Lab - Adult (02.08.19 @ 18:08) >  INDICATIONS: Initial NSTEMI.  HISTORY: The patient has hypertension and medication-treated dyslipidemia.  PROCEDURE:  --  Left heart catheterization with ventriculography.  --  Left coronary angiography.  --  Right coronary angiography.  --  Coronary Thrombectomy.  --  Hemostasis with Mynx-Intervention.  --  Intervention on proximal circumflex: drug-eluting stent, balloon  angioplasty, thrombectomy.  Local anesthetic given. Right radial artery access. Right femoral artery  access. Left heart catheterization. Ventriculography was performed. Left  coronary artery angiography. The vessel was injected utilizing a catheter.  Right coronary artery angiography. The vessel was injected utilizing a  catheter. A successful drug-eluting stent with balloon angioplasty and  thrombectomy was performed on the 100 % lesion in the proximal circumflex.  Following intervention there was an excellent angiographic appearance with  a 1 % residual stenosis. There was no dissection. Balloon angioplasty was  performed, using a Euphora 3.0mm X 20mm balloon, with 3 inflations and a  maximum inflation pressure of 12 анна. During the procedure, the previous  guider was changed for a 6F EBU3.5 LAUNCHER guider, and a new [employer]  BLUE 180CM wire was advanced across the lesion. Balloon angioplasty was  performed, using a EUPHORA 2.5MM X 30MM balloon, with 2 inflations and a  maximum inflation pressure of 12 анна. Balloon angioplasty was performed,  using a 3.0 X 15 NC EUPHORA BALLOON balloon, with 1 inflations and a  maximum inflation pressure of 20 анна. A ANITA 3.00 X 38MM drug-eluting  stent was placed across the lesion and deployed at a maximum inflation  pressure of 15 анна. Balloon angioplasty was performed, using a 3.5 X 15 NC  EUPHORA BALLOON balloon, with 2 inflations and a maximum inflation  pressure of 10 анна. Coronary Thrombectomy. Hemostasis with  Mynx-Intervention.  CONTRAST GIVEN: Omnipaque 112 ml.  MEDICATIONS GIVEN: Fentanyl, 25 mcg, IV. Verapamil (Isoptin, Calan,  Covera), 5 mg, IA. Nitroglycerin, 200 mcg, intracoronary. Nicardipine  (Cardene), 250 mcg, intracoronary. Heparin, 4000 units, IA. Heparin, 7000  units, IV. 1% Lidocaine, 10 ml, subcutaneously. 1% Lidocaine, 10 ml,  subcutaneously. ticagrelor, 180 mg, PO. Lasix (Furosemide), 40 mg, IV.  VENTRICLES: LVEF 40%  CORONARY VESSELS: The coronary circulation is right dominant.  LM:   --  LM: Normal.  LAD:   --  LAD: Angiography showed minor luminal irregularities with no  flow limiting lesions.  CX:   --  Proximal circumflex: There was a 100 % stenosis.  RCA:   --  Mid RCA: There was a 100 % stenosis. There was poor collateral  blood supply to the distal myocardium.  COMPLICATIONS: There were no complications.  DIAGNOSTIC IMPRESSIONS: Acute occlusion of proximal LCX and  of RCA with  left to right collaterals. The LCX was treated with  thrombectomy/PTCA/STENT (OSMAR-Resolute) with good result.  DIAGNOSTIC RECOMMENDATIONS: ASA and Brilinta  INTERVENTIONAL IMPRESSIONS: Acute occlusion of proximal LCX and  of RCA  with left to right collaterals. The LCX was treated with  thrombectomy/PTCA/STENT (OSMAR-Resolute) with good result.  INTERVENTIONAL RECOMMENDATIONS: ASA and Luis Daniel  Prepared and signed by  Hipolito Avila MD    < end of copied text >    CRITICAL CARE TIME SPENT: 60

## 2019-02-08 NOTE — H&P ADULT - NSHPPHYSICALEXAM_GEN_ALL_CORE
GENERAL: on BIPAP very anxious   HEAD:  Atraumatic, Normocephalic  ENMT: No tonsillar erythema, exudates, or enlargement; Moist mucous membranes,   NERVOUS SYSTEM:  Alert & Oriented X3, Moves 5/5 B/L upper and lower extremities;  CHEST/LUNG: Clear to percussion bilaterally; No rales, rhonchi, wheezing, or rubs  HEART: Regular rate and rhythm; No murmurs, rubs, or gallops  ABDOMEN: Soft, Nontender, Nondistended; Bowel sounds present  EXTREMITIES:  2+ Peripheral  edema

## 2019-02-08 NOTE — ED ADULT NURSE NOTE - OBJECTIVE STATEMENT
Patient found sitting on stretcher, awake, alert, and oriented times 3, breathing labored, patient placed on oxygen.  Patient complaining of generalized back pain which has been intermittent for 2 weeks.  Patient states has been going to chiropractor with no relief.  Patient states does use oxygen when needed.

## 2019-02-08 NOTE — ED STATDOCS - PROGRESS NOTE DETAILS
Patient is a 73 year old F with a PMHx of lung CA, chronic back pain, anxiety, HTN, hypothyroidism and scoliosis who presents to the ED complaining of worsening lower back pain x1 month. Chest x-ray from 10/29/2018 showed numerous new nodules.  Patient will be sent to the main ED for further evaluation.   Patient is on supplemental O2 and tank is running low.    PMD: Dr. Faulkner  Oncologist: Dr. Wagner

## 2019-02-08 NOTE — PROGRESS NOTE ADULT - ASSESSMENT
Nurse Practitioner Progress note:     INTERVAL HISTORY:    MEDICATIONS:  furosemide   Injectable 40 milliGRAM(s) IV Push every 12 hours  hydrALAZINE 10 milliGRAM(s) Oral four times a day  losartan 50 milliGRAM(s) Oral daily  NIFEdipine XL 90 milliGRAM(s) Oral daily      ALBUTerol    90 MICROgram(s) HFA Inhaler 1 Puff(s) Inhalation every 4 hours  ALBUTerol/ipratropium for Nebulization 3 milliLiter(s) Nebulizer every 6 hours  tiotropium 18 MICROgram(s) Capsule 1 Capsule(s) Inhalation daily    ALPRAZolam 0.25 milliGRAM(s) Oral three times a day PRN  aspirin 325 milliGRAM(s) Oral Once  aspirin Suppository 300 milliGRAM(s) Rectal once  cyclobenzaprine 10 milliGRAM(s) Oral three times a day PRN  DULoxetine 30 milliGRAM(s) Oral daily  HYDROmorphone   Tablet 4 milliGRAM(s) Oral every 6 hours PRN  pregabalin 25 milliGRAM(s) Oral every 8 hours  sertraline 100 milliGRAM(s) Oral daily    pantoprazole    Tablet 40 milliGRAM(s) Oral before breakfast  senna 2 Tablet(s) Oral at bedtime PRN    levothyroxine 125 MICROGram(s) Oral daily  predniSONE   Tablet 5 milliGRAM(s) Oral daily    aspirin  chewable 81 milliGRAM(s) Oral daily  heparin  Infusion.  Unit(s)/Hr IV Continuous <Continuous>  heparin  Injectable 5300 Unit(s) IV Push every 6 hours PRN  lidocaine   Patch 1 Patch Transdermal daily      TELEMETRY:     T(C): 36.7 (02-08-19 @ 16:00), Max: 36.7 (02-08-19 @ 16:00)  HR: 68 (02-08-19 @ 17:56) (68 - 84)  BP: 130/70 (02-08-19 @ 17:56) (115/95 - 170/91)  RR: 16 (02-08-19 @ 17:56) (16 - 22)  SpO2: 99% (02-08-19 @ 17:56) (93% - 99%)  Wt(kg): --    PHYSICAL EXAM:  Appearance: Normal	  HEENT:   Normal oral mucosa, PERRL  Cardiovascular: Normal S1 S2, No JVD, No murmurs, No edema  Respiratory: Lungs clear to auscultation	  Psychiatry: A & O x 3, Mood & affect appropriate  Gastrointestinal:  Soft, Non-tender, + BS	  Skin: No rashes, No ecchymoses, No cyanosis  Neurologic: Non-focal, A&O X3.  No neuro deficits  Extremities: Normal range of motion, No clubbing, cyanosis or edema  Vascular: Peripheral pulses palpable 2+ bilaterally  Procedure Site: Right radial band in place, right groin site with mynx closure device benign.  No bleeding/hematoma/ecchymosis. + palp     12 lead EKG:  	    LABS:	 	    CARDIAC MARKERS:                            13.5   14.8  )-----------( 331      ( 08 Feb 2019 12:50 )             41.5     02-08    135  |  101  |  25.0<H>  ----------------------------<  128<H>  5.1   |  23.0  |  1.09    Ca    9.3      08 Feb 2019 17:46    TPro  7.9  /  Alb  3.8  /  TBili  0.5  /  DBili  x   /  AST  162<H>  /  ALT  27  /  AlkPhos  87  02-08      PROCEDURE RESULTS: S/P PCI with OSMAR X1 to Circ via right femoral and unsuccessful radial approach    ASSESSMENT/PLAN: 	  -Transfer to MICU  -MD to MD report given to Dr. Lloyd  -Groin precautions  -Bedrest X 3 hrs  -D/C radial band in 1 hour  -Resume home meds  -Initiate ASA/Brilinta  -Initiate Lasix 40 mg IV BID  -Follow up with Dr. Avila  -Check labs/EKG/site check in AM  -Probable discharge in AM

## 2019-02-08 NOTE — ED ADULT NURSE NOTE - NSIMPLEMENTINTERV_GEN_ALL_ED
Implemented All Universal Safety Interventions:  [place] to call system. Call bell, personal items and telephone within reach. Instruct patient to call for assistance. Room bathroom lighting operational. Non-slip footwear when patient is off stretcher. Physically safe environment: no spills, clutter or unnecessary equipment. Stretcher in lowest position, wheels locked, appropriate side rails in place.

## 2019-02-08 NOTE — ED ADULT NURSE REASSESSMENT NOTE - NS ED NURSE REASSESS COMMENT FT1
Patient returned from CT, oxygen sat decreased.  MD aware.  respiratory paged and at bedside with bipap..  patient has no complains of SOB or difficulty breathing.  Patient states congestions.  Cardiac monitor and pulse ox placed.  family at bedside.  Will continue to monitor.  Awaiting all results. Patient returned from CT, oxygen sat decreased to 88%.  MD aware.  respiratory paged and at bedside with bipap..  patient has no complains of SOB or difficulty breathing.  Patient states congestions.  Cardiac monitor and pulse ox placed.  family at bedside.  Will continue to monitor.  Awaiting all results.

## 2019-02-08 NOTE — ED PROVIDER NOTE - CARE PLAN
Principal Discharge DX:	NSTEMI (non-ST elevated myocardial infarction)  Secondary Diagnosis:	Acute pulmonary edema

## 2019-02-08 NOTE — PROGRESS NOTE ADULT - SUBJECTIVE AND OBJECTIVE BOX
[facility]ly [facility], THE [facility]                                   [address] [address]                                                      PHONE: [phone]                                                         FAX: [phone]  [email or web address]  ---------------------------------------------------------------------------------------------------------------------------------    Please see cath report.      Acute occlusion of proximal LCX with chronic total RCA with left to right collaterals.  LVEF 40% with elevated LVEDP.  The LCX was treated with PTCA and STENT with good result.  ASA and Brilinta,  iv lasix,  Observe in ICU    Hipolito Avila MD    Office [phone]  Cell     [phone]

## 2019-02-09 LAB
ANION GAP SERPL CALC-SCNC: 14 MMOL/L — SIGNIFICANT CHANGE UP (ref 5–17)
APTT BLD: 34 SEC — SIGNIFICANT CHANGE UP (ref 27.5–36.3)
BUN SERPL-MCNC: 26 MG/DL — HIGH (ref 8–20)
CALCIUM SERPL-MCNC: 9.3 MG/DL — SIGNIFICANT CHANGE UP (ref 8.6–10.2)
CHLORIDE SERPL-SCNC: 101 MMOL/L — SIGNIFICANT CHANGE UP (ref 98–107)
CO2 SERPL-SCNC: 20 MMOL/L — LOW (ref 22–29)
CREAT SERPL-MCNC: 1.18 MG/DL — SIGNIFICANT CHANGE UP (ref 0.5–1.3)
GLUCOSE SERPL-MCNC: 106 MG/DL — SIGNIFICANT CHANGE UP (ref 70–115)
HCT VFR BLD CALC: 40 % — SIGNIFICANT CHANGE UP (ref 37–47)
HGB BLD-MCNC: 12.8 G/DL — SIGNIFICANT CHANGE UP (ref 12–16)
MAGNESIUM SERPL-MCNC: 2.1 MG/DL — SIGNIFICANT CHANGE UP (ref 1.6–2.6)
MCHC RBC-ENTMCNC: 29.2 PG — SIGNIFICANT CHANGE UP (ref 27–31)
MCHC RBC-ENTMCNC: 32 G/DL — SIGNIFICANT CHANGE UP (ref 32–36)
MCV RBC AUTO: 91.3 FL — SIGNIFICANT CHANGE UP (ref 81–99)
PLATELET # BLD AUTO: 262 K/UL — SIGNIFICANT CHANGE UP (ref 150–400)
POTASSIUM SERPL-MCNC: 4.5 MMOL/L — SIGNIFICANT CHANGE UP (ref 3.5–5.3)
POTASSIUM SERPL-SCNC: 4.5 MMOL/L — SIGNIFICANT CHANGE UP (ref 3.5–5.3)
RBC # BLD: 4.38 M/UL — LOW (ref 4.4–5.2)
RBC # FLD: 15.6 % — SIGNIFICANT CHANGE UP (ref 11–15.6)
SODIUM SERPL-SCNC: 135 MMOL/L — SIGNIFICANT CHANGE UP (ref 135–145)
T3 SERPL-MCNC: 80 NG/DL — SIGNIFICANT CHANGE UP (ref 80–200)
T4 AB SER-ACNC: 6.3 UG/DL — SIGNIFICANT CHANGE UP (ref 4.5–12)
TSH SERPL-MCNC: 12.26 UIU/ML — HIGH (ref 0.27–4.2)
WBC # BLD: 9.8 K/UL — SIGNIFICANT CHANGE UP (ref 4.8–10.8)
WBC # FLD AUTO: 9.8 K/UL — SIGNIFICANT CHANGE UP (ref 4.8–10.8)

## 2019-02-09 PROCEDURE — 93306 TTE W/DOPPLER COMPLETE: CPT | Mod: 26

## 2019-02-09 RX ADMIN — Medication 0.25 MILLIGRAM(S): at 23:36

## 2019-02-09 RX ADMIN — Medication 125 MICROGRAM(S): at 05:09

## 2019-02-09 RX ADMIN — Medication 3 MILLILITER(S): at 09:39

## 2019-02-09 RX ADMIN — Medication 5 MILLIGRAM(S): at 05:08

## 2019-02-09 RX ADMIN — TICAGRELOR 90 MILLIGRAM(S): 90 TABLET ORAL at 05:09

## 2019-02-09 RX ADMIN — LIDOCAINE 1 PATCH: 4 CREAM TOPICAL at 19:00

## 2019-02-09 RX ADMIN — Medication 1 APPLICATION(S): at 00:34

## 2019-02-09 RX ADMIN — Medication 3 MILLILITER(S): at 14:58

## 2019-02-09 RX ADMIN — Medication 1 APPLICATION(S): at 14:02

## 2019-02-09 RX ADMIN — LIDOCAINE 1 PATCH: 4 CREAM TOPICAL at 23:37

## 2019-02-09 RX ADMIN — Medication 10 MILLIGRAM(S): at 05:04

## 2019-02-09 RX ADMIN — LIDOCAINE 1 PATCH: 4 CREAM TOPICAL at 12:37

## 2019-02-09 RX ADMIN — Medication 3 MILLILITER(S): at 03:53

## 2019-02-09 RX ADMIN — Medication 3 MILLILITER(S): at 21:13

## 2019-02-09 RX ADMIN — Medication 1 APPLICATION(S): at 05:08

## 2019-02-09 RX ADMIN — Medication 10 MILLIGRAM(S): at 17:33

## 2019-02-09 RX ADMIN — Medication 0.25 MILLIGRAM(S): at 05:13

## 2019-02-09 RX ADMIN — HYDROMORPHONE HYDROCHLORIDE 4 MILLIGRAM(S): 2 INJECTION INTRAMUSCULAR; INTRAVENOUS; SUBCUTANEOUS at 23:36

## 2019-02-09 RX ADMIN — Medication 81 MILLIGRAM(S): at 12:36

## 2019-02-09 RX ADMIN — LOSARTAN POTASSIUM 50 MILLIGRAM(S): 100 TABLET, FILM COATED ORAL at 12:36

## 2019-02-09 RX ADMIN — Medication 25 MILLIGRAM(S): at 05:04

## 2019-02-09 RX ADMIN — Medication 40 MILLIGRAM(S): at 05:07

## 2019-02-09 RX ADMIN — Medication 1 PATCH: at 12:35

## 2019-02-09 RX ADMIN — HYDROMORPHONE HYDROCHLORIDE 4 MILLIGRAM(S): 2 INJECTION INTRAMUSCULAR; INTRAVENOUS; SUBCUTANEOUS at 00:33

## 2019-02-09 RX ADMIN — Medication 90 MILLIGRAM(S): at 05:08

## 2019-02-09 RX ADMIN — TICAGRELOR 90 MILLIGRAM(S): 90 TABLET ORAL at 17:31

## 2019-02-09 RX ADMIN — Medication 1 APPLICATION(S): at 23:37

## 2019-02-09 RX ADMIN — ATORVASTATIN CALCIUM 40 MILLIGRAM(S): 80 TABLET, FILM COATED ORAL at 22:38

## 2019-02-09 RX ADMIN — Medication 10 MILLIGRAM(S): at 00:34

## 2019-02-09 RX ADMIN — Medication 40 MILLIGRAM(S): at 17:32

## 2019-02-09 RX ADMIN — Medication 1 PATCH: at 19:00

## 2019-02-09 RX ADMIN — Medication 25 MILLIGRAM(S): at 16:06

## 2019-02-09 RX ADMIN — PANTOPRAZOLE SODIUM 40 MILLIGRAM(S): 20 TABLET, DELAYED RELEASE ORAL at 08:03

## 2019-02-09 RX ADMIN — HYDROMORPHONE HYDROCHLORIDE 4 MILLIGRAM(S): 2 INJECTION INTRAMUSCULAR; INTRAVENOUS; SUBCUTANEOUS at 01:03

## 2019-02-09 NOTE — PROGRESS NOTE ADULT - ASSESSMENT
Assessment / Plan 74 yo female with lung ca with acute occlusion of LCX, successful thrombectomy and PCI     plan   asa, brilinta  cxr = CHF , lasix for diuresis  monitor I/O and VS   monitor labs   am EKG   plan as per ICU

## 2019-02-09 NOTE — PROGRESS NOTE ADULT - SUBJECTIVE AND OBJECTIVE BOX
[facility] - [facility], THE [facility]ART [facility]ENTER                                   [address] [place] [address]                                                      PHONE: [phone]                                                         FAX: [phone]  [email or web address]  ---------------------------------------------------------------------------------------------------------------------------------    Overnight events/patient complaints: Feeling better today, breathing improved.  Has not ambulated yet.      No Known Allergies    MEDICATIONS  (STANDING):  ALBUTerol    90 MICROgram(s) HFA Inhaler 1 Puff(s) Inhalation every 4 hours  ALBUTerol/ipratropium for Nebulization 3 milliLiter(s) Nebulizer every 6 hours  aspirin  chewable 81 milliGRAM(s) Oral daily  atorvastatin 40 milliGRAM(s) Oral at bedtime  furosemide   Injectable 40 milliGRAM(s) IV Push every 12 hours  hydrALAZINE 10 milliGRAM(s) Oral four times a day  influenza   Vaccine 0.5 milliLiter(s) IntraMuscular once  levothyroxine 125 MICROGram(s) Oral daily  lidocaine   Patch 1 Patch Transdermal daily  losartan 50 milliGRAM(s) Oral daily  nicotine -  14 mG/24Hr(s) Patch 1 patch Transdermal daily  NIFEdipine XL 90 milliGRAM(s) Oral daily  pantoprazole    Tablet 40 milliGRAM(s) Oral before breakfast  predniSONE   Tablet 5 milliGRAM(s) Oral daily  pregabalin 25 milliGRAM(s) Oral every 8 hours  ticagrelor 90 milliGRAM(s) Oral two times a day  tiotropium 18 MICROgram(s) Capsule 1 Capsule(s) Inhalation daily  vitamin A &amp; D Ointment 1 Application(s) Topical three times a day    MEDICATIONS  (PRN):  ALPRAZolam 0.25 milliGRAM(s) Oral three times a day PRN anxiety  cyclobenzaprine 10 milliGRAM(s) Oral three times a day PRN Muscle Spasm  HYDROmorphone   Tablet 4 milliGRAM(s) Oral every 6 hours PRN Severe Pain (7 - 10)  ondansetron Injectable 4 milliGRAM(s) IV Push every 8 hours PRN Nausea and/or Vomiting  senna 2 Tablet(s) Oral at bedtime PRN Constipation      Vital Signs Last 24 Hrs  T(C): 36.8 (09 Feb 2019 04:28), Max: 37.1 (08 Feb 2019 20:00)  T(F): 98.3 (09 Feb 2019 04:28), Max: 98.7 (08 Feb 2019 20:00)  HR: 62 (09 Feb 2019 08:00) (59 - 84)  BP: 116/55 (09 Feb 2019 08:00) (102/59 - 170/91)  BP(mean): 78 (09 Feb 2019 08:00) (75 - 94)  RR: 16 (09 Feb 2019 08:00) (15 - 24)  SpO2: 96% (09 Feb 2019 08:00) (90% - 99%)  ICU Vital Signs Last 24 Hrs  JOEY CHRISTOPHER  I&O's Detail    08 Feb 2019 07:01  -  09 Feb 2019 07:00  --------------------------------------------------------  IN:  Total IN: 0 mL    OUT:    Indwelling Catheter - Urethral: 1290 mL  Total OUT: 1290 mL    Total NET: -1290 mL      09 Feb 2019 07:01  -  09 Feb 2019 09:13  --------------------------------------------------------  IN:    Oral Fluid: 100 mL  Total IN: 100 mL    OUT:    Indwelling Catheter - Urethral: 75 mL  Total OUT: 75 mL    Total NET: 25 mL        I&O's Summary    08 Feb 2019 07:01  -  09 Feb 2019 07:00  --------------------------------------------------------  IN: 0 mL / OUT: 1290 mL / NET: -1290 mL    09 Feb 2019 07:01  -  09 Feb 2019 09:13  --------------------------------------------------------  IN: 100 mL / OUT: 75 mL / NET: 25 mL      Drug Dosing Weight  JOEY CHRISTOPHER      PHYSICAL EXAM:  General: Appears well developed, well nourished alert and cooperative.  HEENT: Head; normocephalic, atraumatic.  Eyes: Pupils reactive, cornea wnl.  Neck: Supple, no nodes adenopathy, no NVD or carotid bruit or thyromegaly.  CARDIOVASCULAR: Normal S1 and S2, No murmur, rub, gallop or lift.   LUNGS: No rales, rhonchi or wheeze. Normal breath sounds bilaterally.  ABDOMEN: Soft, nontender without mass or organomegaly. bowel sounds normoactive.  EXTREMITIES: No clubbing, cyanosis or edema. Distal pulses wnl.   SKIN: warm and dry with normal turgor.  NEURO: Alert/oriented x 3/normal motor exam. No pathologic reflexes.    PSYCH: normal affect.        LABS:                        12.8   9.8   )-----------( 262      ( 09 Feb 2019 05:27 )             40.0     02-09    135  |  101  |  26.0<H>  ----------------------------<  106  4.5   |  20.0<L>  |  1.18    Ca    9.3      09 Feb 2019 05:27  Mg     2.1     02-09    TPro  7.9  /  Alb  3.8  /  TBili  0.5  /  DBili  x   /  AST  162<H>  /  ALT  27  /  AlkPhos  87  02-08    JOEY ASHWIN  CARDIAC MARKERS ( 08 Feb 2019 12:50 )  x     / 2.04 ng/mL / 2823 U/L / x     / 96.9 ng/mL      PT/INR - ( 08 Feb 2019 12:50 )   PT: 12.0 sec;   INR: 1.04 ratio         PTT - ( 09 Feb 2019 05:28 )  PTT:34.0 sec      RADIOLOGY & ADDITIONAL STUDIES:    INTERPRETATION OF TELEMETRY (personally reviewed): sinus rhythm      CARDIAC CATHETERIZATION: PCI acute occl prox Lcx.  old occl RCA with collaterals.  EF 40%    ASSESSMENT AND PLAN:  STEMI Lcx- s/p PCI yesterday  Ischemic cardiomyopathy EF 40% by V-gram yest.  Check echo.  Reduce lasix to 40mg IV daily.  Would d/c hydralazine and continue other cardiac meds  Ambulate  ?transfer 4T  Echo  No B-blocker due to COPD?

## 2019-02-09 NOTE — CHART NOTE - NSCHARTNOTEFT_GEN_A_CORE
pt sat 100%on .50vm. titrated fio2 .35vm sat 93%. pt in no discomfort no resp.distress. will continue to monitor. bipap s/b

## 2019-02-09 NOTE — PROGRESS NOTE ADULT - ASSESSMENT
74 yo female, PMHX HTN, HLD, hypothyroidism, metastatic Lung CA (received chemo and XRT), former smoker, presented with NSTEMI s/p PCI 1DES to Cx    NEURO: Continue pain regimen, anxiolytics, and muscle relaxants  PULM: Duonebs, inhaled and oral steroids. Continue supplemental O2 as needed to maintain SpO2 >88%. Liberated BiPAP, continue as needed for respiratory distress. NRT  CARDIO: s/p PCI, EKG improved. On DAPT with ASA / Brilinta. On ARB, statin. Hydralazine d/c. Continue aggressive diuresis with Lasix 40 IV BID; Continue CCB. f/u with cardiology re: beta blocker  GI: PPI and bowel regimen. Zofran PRN. DASH Diet  RENAL: Mild EDER likely related to dye load. Continue to monitor, replace electrolytes as needed for goal K >4 and Mg >2. Monitor strict I&Os, goal net neg  ID: no active issues  HEME: SCDs  ENDO: Continue Synthroid 72 yo female, PMHX HTN, HLD, hypothyroidism, metastatic Lung CA (received chemo and XRT), former smoker, presented with NSTEMI s/p PCI 1DES to Cx    NEURO: Continue pain regimen, anxiolytics, and muscle relaxants  PULM: Duonebs, inhaled and oral steroids. Continue supplemental O2 as needed to maintain SpO2 >88%. Liberated BiPAP, continue as needed for respiratory distress. NRT  CARDIO: s/p PCI, EKG improved. On DAPT with ASA / Brilinta. On ARB, statin. Hydralazine d/c. Continue aggressive diuresis with Lasix 40 IV BID; Continue CCB. f/u with cardiology re: beta blocker  GI: PPI and bowel regimen. Zofran PRN. DASH Diet  RENAL: Mild EDER likely related to dye load. Continue to monitor, replace electrolytes as needed for goal K >4 and Mg >2. Monitor strict I&Os, goal net neg  ID: no active issues  HEME: SCDs  ENDO: Continue Synthroid    Patient has been optimized for transfer out of ICU to telemetry unit. 72 yo female, PMHX HTN, HLD, hypothyroidism, metastatic Lung CA (received chemo and XRT), former smoker, presented with NSTEMI s/p PCI 1DES to Cx, acute hypoxemic respiratory failure secondary to acute pulmonary edema, acute systolic heart failure    NEURO: Continue pain regimen, anxiolytics, and muscle relaxants  PULM: Duonebs, inhaled and oral steroids. Continue supplemental O2 as needed to maintain SpO2 >88%. Liberated BiPAP, continue as needed for respiratory distress. NRT  CARDIO: s/p PCI, EKG improved. On DAPT with ASA / Brilinta. On ARB, statin. Hydralazine d/c. Continue aggressive diuresis with Lasix 40 IV BID; Continue CCB. f/u with cardiology re: beta blocker  GI: PPI and bowel regimen. Zofran PRN. DASH Diet  RENAL: Mild EDER likely related to dye load. Continue to monitor, replace electrolytes as needed for goal K >4 and Mg >2. Monitor strict I&Os, goal net neg fluid balance and UOP >0.5cc/kg/hr  ID: no active issues  HEME: SCDs  ENDO: Continue Synthroid    Patient has been optimized for transfer out of ICU to telemetry unit.

## 2019-02-09 NOTE — PROGRESS NOTE ADULT - SUBJECTIVE AND OBJECTIVE BOX
BRIEF HOSPITAL COURSE: 73y Female PMHX HTN, HLD, hypothyroidism, metastatic Lung CA (received chemo and XRT), former smoker, who presented to [facility] ED with upperback pain.  Pt has had progressively worsening back pain for the past few days.  Her pain was b/l shoulders intermittently occurring  She went to the chiropractor today and had worsening of her symptoms.  In the ED she has new ST depression in the anterior/lateral leads with trop of 2.0.  She was in respiratory distress requiring bipap and lasix.  She does mention worsening sob/LE edema for the past few days.   She is now Post PCI to the LAD .     Pt seen and examined   A&O X3   respiratory bilateral rales and crackles, NAD, Diminished breath sounds on the right.   S1 S2 No murmur or rub noted.   Milian clear dark yellow  right groin site benign .   Abdomen soft non tender.       EXAM:  XR CHEST PORTABLE IMMED 1V                          PROCEDURE DATE:  02/08/2019      INTERPRETATION:  TECHNIQUE: Single portable view of the chest.    COMPARISON: 12/19/2015    CLINICAL HISTORY: Chest pain.     FINDINGS:    Single frontal view of the chest demonstrates moderate CHF. The   cardiomediastinal silhouette is enlarged. No acute osseous abnormalities.     IMPRESSION: Moderate CHF. Cardiomegaly.                              12.8   9.8   )-----------( 262      ( 09 Feb 2019 05:27 )             40.0     02-09    135  |  101  |  26.0<H>  ----------------------------<  106  4.5   |  20.0<L>  |  1.18    Ca    9.3      09 Feb 2019 05:27  Mg     2.1     02-09    TPro  7.9  /  Alb  3.8  /  TBili  0.5  /  DBili  x   /  AST  162<H>  /  ALT  27  /  AlkPhos  87  02-08    Vital Signs Last 24 Hrs  T(C): 36.9 (09 Feb 2019 12:00), Max: 37.1 (08 Feb 2019 20:00)  T(F): 98.4 (09 Feb 2019 12:00), Max: 98.7 (08 Feb 2019 20:00)  HR: 66 (09 Feb 2019 12:00) (59 - 84)  BP: 96/60 (09 Feb 2019 12:00) (96/60 - 130/70)  BP(mean): 73 (09 Feb 2019 12:00) (69 - 94)  RR: 26 (09 Feb 2019 12:00) (15 - 27)  SpO2: 94% (09 Feb 2019 12:00) (90% - 99%)    I&O's Summary    08 Feb 2019 07:01  -  09 Feb 2019 07:00  --------------------------------------------------------  IN: 0 mL / OUT: 1290 mL / NET: -1290 mL    09 Feb 2019 07:01  -  09 Feb 2019 12:55  --------------------------------------------------------  IN: 200 mL / OUT: 155 mL / NET: 45 mL

## 2019-02-09 NOTE — PROGRESS NOTE ADULT - SUBJECTIVE AND OBJECTIVE BOX
Patient is a 73y old  Female who presents with a chief complaint of back pain and SOB (09 Feb 2019 12:46)      BRIEF HOSPITAL COURSE: 74 yo female, PMHX HTN, HLD, hypothyroidism, metastatic Lung CA (received chemo and XRT), former smoker, who presented to [facility] ED with upper back pain progressively worsening over the past few days, with LE edema and shortness of breath. EKG revealed ST depressions in the anteriolateral leads, trop of 2.0, acute respiratory failure requiring diuresis and BiPAP. Patient was taken for cardiac cath, now s/p PCI found to have acute occluded prox LCx received 1DES. EF 40%.    Events last 24 hours: Anxious. Hydralazine held overnight. On 3lpm NC overnight    PAST MEDICAL & SURGICAL HISTORY:  Carcinoma: metastic stage 4 with stephanie to the iliac bone, colon, and lung  Iliac crest bone pain: cancer, right  Anxiety  Hypothyroid  Hypertension  No significant past surgical history      SOCIAL HISTORY: former smoker    Review of Systems:  CONSTITUTIONAL: No fever, chills, or fatigue  EYES: No eye pain, visual disturbances, or discharge  ENMT:  No difficulty hearing, tinnitus, vertigo; No sinus or throat pain  NECK: No pain or stiffness  RESPIRATORY: No cough, wheezing, chills or hemoptysis; No shortness of breath  CARDIOVASCULAR: No chest pain, palpitations, dizziness, or leg swelling  GASTROINTESTINAL: No abdominal or epigastric pain. No nausea, vomiting, or hematemesis; No diarrhea or constipation. No melena or hematochezia.  GENITOURINARY: No dysuria, frequency, hematuria, or incontinence  NEUROLOGICAL: No headaches, memory loss, loss of strength, numbness, or tremors  SKIN: No itching, burning, rashes, or lesions   MUSCULOSKELETAL: +chronic pain  PSYCHIATRIC: +anxiety, difficulty sleeping      Medications:  furosemide   Injectable 40 milliGRAM(s) IV Push every 12 hours  losartan 50 milliGRAM(s) Oral daily  NIFEdipine XL 90 milliGRAM(s) Oral daily  ALBUTerol    90 MICROgram(s) HFA Inhaler 1 Puff(s) Inhalation every 4 hours  ALBUTerol/ipratropium for Nebulization 3 milliLiter(s) Nebulizer every 6 hours  tiotropium 18 MICROgram(s) Capsule 1 Capsule(s) Inhalation daily  ALPRAZolam 0.25 milliGRAM(s) Oral three times a day PRN  cyclobenzaprine 10 milliGRAM(s) Oral three times a day PRN  HYDROmorphone   Tablet 4 milliGRAM(s) Oral every 6 hours PRN  ondansetron Injectable 4 milliGRAM(s) IV Push every 8 hours PRN  pregabalin 25 milliGRAM(s) Oral every 8 hours  aspirin  chewable 81 milliGRAM(s) Oral daily  ticagrelor 90 milliGRAM(s) Oral two times a day  pantoprazole    Tablet 40 milliGRAM(s) Oral before breakfast  senna 2 Tablet(s) Oral at bedtime PRN  atorvastatin 40 milliGRAM(s) Oral at bedtime  levothyroxine 125 MICROGram(s) Oral daily  predniSONE   Tablet 5 milliGRAM(s) Oral daily  influenza   Vaccine 0.5 milliLiter(s) IntraMuscular once  lidocaine   Patch 1 Patch Transdermal daily  vitamin A &amp; D Ointment 1 Application(s) Topical three times a day  nicotine -  14 mG/24Hr(s) Patch 1 patch Transdermal daily        ICU Vital Signs Last 24 Hrs  T(C): 36.6 (09 Feb 2019 23:47), Max: 36.9 (09 Feb 2019 07:00)  T(F): 97.9 (09 Feb 2019 23:47), Max: 98.5 (09 Feb 2019 07:00)  HR: 74 (10 Feb 2019 03:00) (60 - 80)  BP: 112/55 (10 Feb 2019 03:00) (96/60 - 122/60)  BP(mean): 78 (10 Feb 2019 03:00) (69 - 102)  ABP: --  ABP(mean): --  RR: 29 (10 Feb 2019 03:00) (16 - 32)  SpO2: 90% (10 Feb 2019 03:00) (80% - 97%)      I&O's Detail    08 Feb 2019 07:01  -  09 Feb 2019 07:00  --------------------------------------------------------  IN:  Total IN: 0 mL    OUT:    Indwelling Catheter - Urethral: 1290 mL  Total OUT: 1290 mL    Total NET: -1290 mL      09 Feb 2019 07:01  -  10 Feb 2019 03:25  --------------------------------------------------------  IN:    Oral Fluid: 200 mL  Total IN: 200 mL    OUT:    Indwelling Catheter - Urethral: 510 mL  Total OUT: 510 mL    Total NET: -310 mL      LABS:                        12.8   9.8   )-----------( 262      ( 09 Feb 2019 05:27 )             40.0     02-09    135  |  101  |  26.0<H>  ----------------------------<  106  4.5   |  20.0<L>  |  1.18    Ca    9.3      09 Feb 2019 05:27  Mg     2.1     02-09    TPro  7.9  /  Alb  3.8  /  TBili  0.5  /  DBili  x   /  AST  162<H>  /  ALT  27  /  AlkPhos  87  02-08      CARDIAC MARKERS ( 08 Feb 2019 12:50 )  x     / 2.04 ng/mL / 2823 U/L / x     / 96.9 ng/mL      CAPILLARY BLOOD GLUCOSE        PT/INR - ( 08 Feb 2019 12:50 )   PT: 12.0 sec;   INR: 1.04 ratio         PTT - ( 09 Feb 2019 05:28 )  PTT:34.0 sec    CULTURES:      Physical Examination:    General: No acute distress.      HEENT: Pupils equal, reactive to light.  Symmetric.    PULM: Crackles to auscultation bilaterally, no significant sputum production    CVS: Regular rate and rhythm, no murmurs, rubs, or gallops    ABD: Soft, nondistended, nontender, normoactive bowel sounds, no masses    EXT: R groin soft no hematoma, 2+ DP pulse. LE dependent edema    SKIN: Warm and well perfused, no rashes noted.    NEURO: Alert, oriented, interactive, nonfocal        < from: Cardiac Cath Lab - Adult (02.08.19 @ 18:08) >  VENTRICLES: LVEF 40%  CORONARY VESSELS: The coronary circulation is right dominant.  LM:   --  LM: Normal.  LAD:   --  LAD: Angiography showed minor luminal irregularities with no  flow limiting lesions.  CX:   --  Proximal circumflex: There was a 100 % stenosis.  RCA:   --  Mid RCA: There was a 100 % stenosis. There was poor collateral  blood supply to the distal myocardium.  COMPLICATIONS: There were no complications.  DIAGNOSTIC IMPRESSIONS: Acute occlusion of proximal LCX and  of RCA with  left to right collaterals. The LCX was treated with  thrombectomy/PTCA/STENT ([facility]) with good result.  DIAGNOSTIC RECOMMENDATIONS: ASA and Brilinta  INTERVENTIONAL IMPRESSIONS: Acute occlusion of proximal LCX and  of RCA  with left to right collaterals. The LCX was treated with  thrombectomy/PTCA/STENT ([facility]) with good result.  INTERVENTIONAL RECOMMENDATIONS: ASA and Luis Daniel  Prepared and signed by  Hipolito Avila MD  Signed 02/08/2019 19:21:22      INVASIVE LINES:  INDWELLING DODGE:  Y   VTE PROPHYLAXIS:   CAM ICU: -   CODE STATUS: FULL     TIME SPENT: 20 minutes assessing presenting problems of acute illness, which pose high probability of life threatening deterioration or end organ damage/dysfunction, as well as medical decision making including initiating plan of care, reviewing data, reviewing radiologic exams, discussing with multidisciplinary team, non-inclusive of procedures performed, discussing goals of care with patient

## 2019-02-10 DIAGNOSIS — F41.9 ANXIETY DISORDER, UNSPECIFIED: ICD-10-CM

## 2019-02-10 LAB
ANION GAP SERPL CALC-SCNC: 14 MMOL/L — SIGNIFICANT CHANGE UP (ref 5–17)
BUN SERPL-MCNC: 35 MG/DL — HIGH (ref 8–20)
CALCIUM SERPL-MCNC: 9 MG/DL — SIGNIFICANT CHANGE UP (ref 8.6–10.2)
CHLORIDE SERPL-SCNC: 101 MMOL/L — SIGNIFICANT CHANGE UP (ref 98–107)
CO2 SERPL-SCNC: 23 MMOL/L — SIGNIFICANT CHANGE UP (ref 22–29)
CREAT SERPL-MCNC: 1.43 MG/DL — HIGH (ref 0.5–1.3)
GLUCOSE SERPL-MCNC: 107 MG/DL — SIGNIFICANT CHANGE UP (ref 70–115)
HCT VFR BLD CALC: 35.6 % — LOW (ref 37–47)
HGB BLD-MCNC: 11.3 G/DL — LOW (ref 12–16)
MAGNESIUM SERPL-MCNC: 2 MG/DL — SIGNIFICANT CHANGE UP (ref 1.6–2.6)
MCHC RBC-ENTMCNC: 28.5 PG — SIGNIFICANT CHANGE UP (ref 27–31)
MCHC RBC-ENTMCNC: 31.7 G/DL — LOW (ref 32–36)
MCV RBC AUTO: 89.7 FL — SIGNIFICANT CHANGE UP (ref 81–99)
PHOSPHATE SERPL-MCNC: 2.9 MG/DL — SIGNIFICANT CHANGE UP (ref 2.4–4.7)
PLATELET # BLD AUTO: 227 K/UL — SIGNIFICANT CHANGE UP (ref 150–400)
POTASSIUM SERPL-MCNC: 4 MMOL/L — SIGNIFICANT CHANGE UP (ref 3.5–5.3)
POTASSIUM SERPL-SCNC: 4 MMOL/L — SIGNIFICANT CHANGE UP (ref 3.5–5.3)
RBC # BLD: 3.97 M/UL — LOW (ref 4.4–5.2)
RBC # FLD: 15.5 % — SIGNIFICANT CHANGE UP (ref 11–15.6)
SODIUM SERPL-SCNC: 138 MMOL/L — SIGNIFICANT CHANGE UP (ref 135–145)
WBC # BLD: 10.4 K/UL — SIGNIFICANT CHANGE UP (ref 4.8–10.8)
WBC # FLD AUTO: 10.4 K/UL — SIGNIFICANT CHANGE UP (ref 4.8–10.8)

## 2019-02-10 PROCEDURE — 12345: CPT | Mod: NC

## 2019-02-10 PROCEDURE — 99222 1ST HOSP IP/OBS MODERATE 55: CPT

## 2019-02-10 RX ORDER — FUROSEMIDE 40 MG
20 TABLET ORAL DAILY
Qty: 0 | Refills: 0 | Status: DISCONTINUED | OUTPATIENT
Start: 2019-02-10 | End: 2019-02-12

## 2019-02-10 RX ADMIN — Medication 90 MILLIGRAM(S): at 05:01

## 2019-02-10 RX ADMIN — LOSARTAN POTASSIUM 50 MILLIGRAM(S): 100 TABLET, FILM COATED ORAL at 15:06

## 2019-02-10 RX ADMIN — Medication 3 MILLILITER(S): at 08:47

## 2019-02-10 RX ADMIN — Medication 25 MILLIGRAM(S): at 21:57

## 2019-02-10 RX ADMIN — TICAGRELOR 90 MILLIGRAM(S): 90 TABLET ORAL at 18:46

## 2019-02-10 RX ADMIN — Medication 3 MILLILITER(S): at 20:14

## 2019-02-10 RX ADMIN — Medication 1 APPLICATION(S): at 05:03

## 2019-02-10 RX ADMIN — Medication 3 MILLILITER(S): at 02:55

## 2019-02-10 RX ADMIN — Medication 40 MILLIGRAM(S): at 05:01

## 2019-02-10 RX ADMIN — Medication 5 MILLIGRAM(S): at 05:00

## 2019-02-10 RX ADMIN — PANTOPRAZOLE SODIUM 40 MILLIGRAM(S): 20 TABLET, DELAYED RELEASE ORAL at 08:21

## 2019-02-10 RX ADMIN — Medication 125 MICROGRAM(S): at 05:08

## 2019-02-10 RX ADMIN — Medication 81 MILLIGRAM(S): at 12:53

## 2019-02-10 RX ADMIN — Medication 3 MILLILITER(S): at 14:43

## 2019-02-10 RX ADMIN — Medication 1 PATCH: at 19:40

## 2019-02-10 RX ADMIN — Medication 1 APPLICATION(S): at 15:09

## 2019-02-10 RX ADMIN — LIDOCAINE 1 PATCH: 4 CREAM TOPICAL at 23:38

## 2019-02-10 RX ADMIN — HYDROMORPHONE HYDROCHLORIDE 4 MILLIGRAM(S): 2 INJECTION INTRAMUSCULAR; INTRAVENOUS; SUBCUTANEOUS at 00:06

## 2019-02-10 RX ADMIN — TICAGRELOR 90 MILLIGRAM(S): 90 TABLET ORAL at 05:01

## 2019-02-10 RX ADMIN — Medication 25 MILLIGRAM(S): at 15:06

## 2019-02-10 RX ADMIN — ATORVASTATIN CALCIUM 40 MILLIGRAM(S): 80 TABLET, FILM COATED ORAL at 21:57

## 2019-02-10 RX ADMIN — Medication 1 PATCH: at 15:06

## 2019-02-10 RX ADMIN — Medication 25 MILLIGRAM(S): at 05:08

## 2019-02-10 RX ADMIN — LIDOCAINE 1 PATCH: 4 CREAM TOPICAL at 19:38

## 2019-02-10 RX ADMIN — LIDOCAINE 1 PATCH: 4 CREAM TOPICAL at 12:52

## 2019-02-10 NOTE — PROGRESS NOTE ADULT - ASSESSMENT
72 yo female, PMHX HTN, HLD, hypothyroidism, metastatic Lung CA (received chemo and XRT), former smoker, presented with back pain found to have NSTEMI s/p PCI 1DES to Cx, acute hypoxemic respiratory failure secondary to acute pulmonary edema, acute systolic heart failure.     NSTEMI  Continue telemetry   Continue Brilinta 90 mg bid  Aspirin 81mg   Atorvastatin 40mg     Acute hypoxemic respiratory failure 2/2 pulmonary edema   BiPAP as needed   O2 via NC as needed   Lasix 40mg     Acute systolic heart failure   Continue Lasix 40mg  Losartan 50mg     Metastatic Lung CA   Continue pain medication   Duoneb   Prednisone     HTN   Continue Nifedipine XL 90mg     Synthroid   Continue Synthroid 125mcg     Supportive   DVT prophylaxis: SCD   Diet: DASH 72 yo female, PMHX HTN, HLD, hypothyroidism, metastatic Lung CA (received chemo and XRT), former smoker, presented with back pain found to have NSTEMI s/p PCI 1DES to Cx, acute hypoxemic respiratory failure secondary to acute pulmonary edema, acute systolic heart failure.     NSTEMI  Continue telemetry   Continue Brilinta 90 mg bid  Aspirin 81mg   Atorvastatin 40mg     Acute hypoxemic respiratory failure 2/2 pulmonary edema   BiPAP as needed   O2 via NC as needed   Lasix 40mg     Acute systolic heart failure   Continue Lasix 40mg  Losartan 50mg     COPD   On home O2 PRN   Duoneb   Prednisone    Metastatic Lung CA   Continue pain medication      HTN   Continue Nifedipine XL 90mg     Synthroid   Continue Synthroid 125mcg     Tobacco use   Nicotine patch     Supportive   DVT prophylaxis: SCD  GI prophylaxis: PPI 40mg   Diet: DASH

## 2019-02-10 NOTE — CHART NOTE - NSCHARTNOTEFT_GEN_A_CORE
hospitalist follow up.      H&P and hospital course reviewed.  vitals stable (bp borderline low)    no acute complaitns  aaox3 nad rrr s1s2 ctab soft abdomen no LE edema nonfocal neuro.    a/p  NSTEMI, ischemic CM, lung Ca, EDER     c/w current management   trend bun/cr, may need to stop arb    PT evaluation

## 2019-02-10 NOTE — PROGRESS NOTE ADULT - SUBJECTIVE AND OBJECTIVE BOX
[place] [facility] - [facility], THE [facility]ART [facility]                                   [address] [place] [address]                                                      PHONE: [phone]                                                         FAX: [phone]  [email or web address]  ---------------------------------------------------------------------------------------------------------------------------------    Overnight events/patient complaints: Feels "great".  No CP or SOB, no palps or dizziness.  Eating breakfast      No Known Allergies    MEDICATIONS  (STANDING):  ALBUTerol    90 MICROgram(s) HFA Inhaler 1 Puff(s) Inhalation every 4 hours  ALBUTerol/ipratropium for Nebulization 3 milliLiter(s) Nebulizer every 6 hours  aspirin  chewable 81 milliGRAM(s) Oral daily  atorvastatin 40 milliGRAM(s) Oral at bedtime  furosemide    Tablet 20 milliGRAM(s) Oral daily  influenza   Vaccine 0.5 milliLiter(s) IntraMuscular once  levothyroxine 125 MICROGram(s) Oral daily  lidocaine   Patch 1 Patch Transdermal daily  losartan 50 milliGRAM(s) Oral daily  nicotine -  14 mG/24Hr(s) Patch 1 patch Transdermal daily  NIFEdipine XL 90 milliGRAM(s) Oral daily  pantoprazole    Tablet 40 milliGRAM(s) Oral before breakfast  predniSONE   Tablet 5 milliGRAM(s) Oral daily  pregabalin 25 milliGRAM(s) Oral every 8 hours  ticagrelor 90 milliGRAM(s) Oral two times a day  tiotropium 18 MICROgram(s) Capsule 1 Capsule(s) Inhalation daily  vitamin A &amp; D Ointment 1 Application(s) Topical three times a day    MEDICATIONS  (PRN):  ALPRAZolam 0.25 milliGRAM(s) Oral three times a day PRN anxiety  cyclobenzaprine 10 milliGRAM(s) Oral three times a day PRN Muscle Spasm  HYDROmorphone   Tablet 4 milliGRAM(s) Oral every 6 hours PRN Severe Pain (7 - 10)  ondansetron Injectable 4 milliGRAM(s) IV Push every 8 hours PRN Nausea and/or Vomiting  senna 2 Tablet(s) Oral at bedtime PRN Constipation      Vital Signs Last 24 Hrs  T(C): 36.8 (10 Feb 2019 06:26), Max: 36.9 (09 Feb 2019 12:00)  T(F): 98.3 (10 Feb 2019 06:26), Max: 98.5 (09 Feb 2019 16:00)  HR: 82 (10 Feb 2019 06:26) (64 - 86)  BP: 95/43 (10 Feb 2019 06:26) (95/43 - 122/60)  BP(mean): 81 (10 Feb 2019 05:00) (69 - 102)  RR: 20 (10 Feb 2019 06:26) (18 - 32)  SpO2: 88% (10 Feb 2019 06:26) (80% - 96%)  ICU Vital Signs Last 24 Hrs  JOEY CHRISTOPHER  I&O's Detail    09 Feb 2019 07:01  -  10 Feb 2019 07:00  --------------------------------------------------------  IN:    Oral Fluid: 200 mL  Total IN: 200 mL    OUT:    Indwelling Catheter - Urethral: 1160 mL  Total OUT: 1160 mL    Total NET: -960 mL        I&O's Summary    09 Feb 2019 07:01  -  10 Feb 2019 07:00  --------------------------------------------------------  IN: 200 mL / OUT: 1160 mL / NET: -960 mL      Drug Dosing Weight  JOEY ASHWIN      PHYSICAL EXAM:  General: Appears well developed, well nourished alert and cooperative.  HEENT: Head; normocephalic, atraumatic.  Eyes: Pupils reactive, cornea wnl.  Neck: Supple, no nodes adenopathy, no NVD or carotid bruit or thyromegaly.  CARDIOVASCULAR: Normal S1 and S2, No murmur, rub, gallop or lift.   LUNGS: No rales, rhonchi or wheeze. Normal breath sounds bilaterally.  ABDOMEN: Soft, nontender without mass or organomegaly. bowel sounds normoactive.  EXTREMITIES: No clubbing, cyanosis or edema. Distal pulses wnl.   SKIN: warm and dry with normal turgor.  NEURO: Alert/oriented x 3/normal motor exam. No pathologic reflexes.    PSYCH: normal affect.        LABS:                        11.3   10.4  )-----------( 227      ( 10 Feb 2019 06:42 )             35.6     02-10    138  |  101  |  35.0<H>  ----------------------------<  107  4.0   |  23.0  |  1.43<H>    Ca    9.0      10 Feb 2019 06:42  Phos  2.9     02-10  Mg     2.0     02-10    TPro  7.9  /  Alb  3.8  /  TBili  0.5  /  DBili  x   /  AST  162<H>  /  ALT  27  /  AlkPhos  87  02-08    JOEY ASHWIN  CARDIAC MARKERS ( 08 Feb 2019 12:50 )  x     / 2.04 ng/mL / 2823 U/L / x     / 96.9 ng/mL      PT/INR - ( 08 Feb 2019 12:50 )   PT: 12.0 sec;   INR: 1.04 ratio         PTT - ( 09 Feb 2019 05:28 )  PTT:34.0 sec      RADIOLOGY & ADDITIONAL STUDIES:    INTERPRETATION OF TELEMETRY (personally reviewed): sinus, no arrhythmia        ASSESSMENT AND PLAN:  s/p acute MI, stent LCx  metastatic lung ca  Appears stable for discharge home  Reduce lasix to 20mg po daily  Continue other cardiac meds  D/C Milian cath, ambulate  Will arrange f/u with Dr. Avila ~one week  Please call if further questions or issues

## 2019-02-10 NOTE — PROGRESS NOTE ADULT - SUBJECTIVE AND OBJECTIVE BOX
74 y/o female, PMHX HTN, HLD, hypothyroidism, metastatic Lung CA (received chemo and XRT), former smoker, who presented to [facility] ED with upper back pain progressively worsening with LE edema and shortness of breath. In the ED, EKG revealed ST depressions in the anteriolateral leads, trop of 2.0, acute respiratory failure requiring diuresis and BiPAP. Patient was taken for cardiac cath, now s/p PCI found to have acute occluded prox LCx received 1DES. EF 40%.    Today;   Patient seen and examined at bed side     PAST MEDICAL & SURGICAL HISTORY:  Carcinoma: metastatic stage 4 with stephanie to the iliac bone, colon, and lung  Iliac crest bone pain: cancer, right  Anxiety  Hypothyroid  Hypertension    No significant past surgical history    SOCIAL HISTORY: former smoker    Review of Systems:  CONSTITUTIONAL: No fever, chills, or fatigue  EYES: No eye pain, visual disturbances, or discharge  ENMT:  No difficulty hearing, tinnitus, vertigo; No sinus or throat pain  NECK: No pain or stiffness  RESPIRATORY: No cough, wheezing, chills or hemoptysis; No shortness of breath  CARDIOVASCULAR: No chest pain, palpitations, dizziness, or leg swelling  GASTROINTESTINAL: No abdominal or epigastric pain. No nausea, vomiting, or hematemesis; No diarrhea or constipation. No melena or hematochezia.  GENITOURINARY: No dysuria, frequency, hematuria, or incontinence  NEUROLOGICAL: No headaches, memory loss, loss of strength, numbness, or tremors  SKIN: No itching, burning, rashes, or lesions   MUSCULOSKELETAL: +chronic pain  PSYCHIATRIC: +anxiety, difficulty sleeping    Medications:  Furosemide   Injectable 40 milliGRAM(s) IV Push every 12 hours  Losartan 50 milliGRAM(s) Oral daily  NIFEdipine XL 90 milliGRAM(s) Oral daily  ALBUTerol    90 MICROgram(s) HFA Inhaler 1 Puff(s) Inhalation every 4 hours  ALBUTerol/ipratropium for Nebulization 3 milliLiter(s) Nebulizer every 6 hours  Tiotropium 18 MICROgram(s) Capsule 1 Capsule(s) Inhalation daily  Alprazolam 0.25 milliGRAM(s) Oral three times a day PRN  Cyclobenzaprine 10 milliGRAM(s) Oral three times a day PRN  HYDROmorphone   Tablet 4 milliGRAM(s) Oral every 6 hours PRN  Ondansetron Injectable 4 milliGRAM(s) IV Push every 8 hours PRN  Pregabalin 25 milliGRAM(s) Oral every 8 hours  Aspirin  chewable 81 milliGRAM(s) Oral daily  Ticagrelor 90 milliGRAM(s) Oral two times a day  Pantoprazole    Tablet 40 milliGRAM(s) Oral before breakfast  Senna 2 Tablet(s) Oral at bedtime PRN  Atorvastatin 40 milliGRAM(s) Oral at bedtime  Levothyroxine 125 MICROGram(s) Oral daily  PredniSONE   Tablet 5 milliGRAM(s) Oral daily  Lidocaine   Patch 1 Patch Transdermal daily  Vitamin A &amp; D Ointment 1 Application(s) Topical three times a day  Nicotine -  14 mG/24Hr(s) Patch 1 patch Transdermal daily      LABS:                        12.8   9.8   )-----------( 262      ( 09 Feb 2019 05:27 )             40.0     02-09    135  |  101  |  26.0<H>  ----------------------------<  106  4.5   |  20.0<L>  |  1.18    Ca    9.3      09 Feb 2019 05:27  Mg     2.1     02-09    TPro  7.9  /  Alb  3.8  /  TBili  0.5  /  DBili  x   /  AST  162<H>  /  ALT  27  /  AlkPhos  87  02-08      CARDIAC MARKERS ( 08 Feb 2019 12:50 )  x     / 2.04 ng/mL / 2823 U/L / x     / 96.9 ng/mL      Physical Examination:  VS: Temp: 97.9; HR: 74; BP: 112/55; RR: 29;  Sat: 99    General: No acute distress.      HEENT: Pupils equal, reactive to light.  Symmetric.    PULM: Crackles to auscultation bilaterally, no significant sputum production    CVS: Regular rate and rhythm, no murmurs, rubs, or gallops    ABD: Soft, nondistended, nontender, normoactive bowel sounds, no masses    EXT: R groin soft no hematoma, 2+ DP pulse. LE dependent edema    SKIN: Warm and well perfused, no rashes noted.    NEURO: Alert, oriented, interactive, nonfocal        < from: Cardiac Cath Lab - Adult (02.08.19 @ 18:08) >  VENTRICLES: LVEF 40%  CORONARY VESSELS: The coronary circulation is right dominant.  LM:   --  LM: Normal.  LAD:   --  LAD: Angiography showed minor luminal irregularities with no  flow limiting lesions.  CX:   --  Proximal circumflex: There was a 100 % stenosis.  RCA:   --  Mid RCA: There was a 100 % stenosis. There was poor collateral  blood supply to the distal myocardium.  COMPLICATIONS: There were no complications.  DIAGNOSTIC IMPRESSIONS: Acute occlusion of proximal LCX and  of RCA with  left to right collaterals. The LCX was treated with  thrombectomy/PTCA/STENT ([facility]) with good result.  DIAGNOSTIC RECOMMENDATIONS: ASA and Brilinta  INTERVENTIONAL IMPRESSIONS: Acute occlusion of proximal LCX and  of RCA  with left to right collaterals. The LCX was treated with  thrombectomy/PTCA/STENT ([facility]) with good result.  INTERVENTIONAL RECOMMENDATIONS: ASA and Brilinta 72 y/o female, PMHX HTN, HLD, hypothyroidism, metastatic Lung CA (received chemo and XRT), former smoker, who presented to [facility] ED with upper back pain progressively worsening with LE edema and shortness of breath. In the ED, EKG revealed ST depressions in the anteriolateral leads, trop of 2.0, acute respiratory failure requiring diuresis and BiPAP. Patient was taken for cardiac cath, now s/p PCI found to have acute occluded prox LCx received 1DES. EF 40%. Medically stable for medical floor.     Today;   Patient seen and examined at bed side,  at bed side.   Patient noted significant improvement in her back pain and respiratory status   She is tolerating PO well.     PAST MEDICAL & SURGICAL HISTORY:  Carcinoma: metastatic stage 4 with stephanie to the iliac bone, colon, and lung  Iliac crest bone pain: cancer, right  Anxiety  Hypothyroid  Hypertension    No significant past surgical history    SOCIAL HISTORY: smoked 1/2 PPD x 30 years; quit in 2016     Review of Systems:  CONSTITUTIONAL: No fever, chills, or fatigue  EYES: No eye pain, visual disturbances, or discharge  ENMT:  No difficulty hearing, tinnitus, vertigo; No sinus or throat pain  NECK: No pain or stiffness  RESPIRATORY: No cough, wheezing, chills or hemoptysis; No shortness of breath  CARDIOVASCULAR: No chest pain, palpitations, dizziness, or leg swelling  GASTROINTESTINAL: No abdominal or epigastric pain. No nausea, vomiting, or hematemesis; No diarrhea or constipation. No melena or hematochezia.  GENITOURINARY: No dysuria, frequency, hematuria, or incontinence  NEUROLOGICAL: No headaches, memory loss, loss of strength, numbness, or tremors  SKIN: No itching, burning, rashes, or lesions   MUSCULOSKELETAL: +chronic pain  PSYCHIATRIC: +anxiety, difficulty sleeping    Medications:  Furosemide   Injectable 40 milliGRAM(s) IV Push every 12 hours  Losartan 50 milliGRAM(s) Oral daily  NIFEdipine XL 90 milliGRAM(s) Oral daily  ALBUTerol    90 MICROgram(s) HFA Inhaler 1 Puff(s) Inhalation every 4 hours  ALBUTerol/ipratropium for Nebulization 3 milliLiter(s) Nebulizer every 6 hours  Tiotropium 18 MICROgram(s) Capsule 1 Capsule(s) Inhalation daily  Alprazolam 0.25 milliGRAM(s) Oral three times a day PRN  Cyclobenzaprine 10 milliGRAM(s) Oral three times a day PRN  HYDROmorphone   Tablet 4 milliGRAM(s) Oral every 6 hours PRN  Ondansetron Injectable 4 milliGRAM(s) IV Push every 8 hours PRN  Pregabalin 25 milliGRAM(s) Oral every 8 hours  Aspirin  chewable 81 milliGRAM(s) Oral daily  Ticagrelor 90 milliGRAM(s) Oral two times a day  Pantoprazole    Tablet 40 milliGRAM(s) Oral before breakfast  Senna 2 Tablet(s) Oral at bedtime PRN  Atorvastatin 40 milliGRAM(s) Oral at bedtime  Levothyroxine 125 MICROGram(s) Oral daily  PredniSONE   Tablet 5 milliGRAM(s) Oral daily  Lidocaine   Patch 1 Patch Transdermal daily  Vitamin A &amp; D Ointment 1 Application(s) Topical three times a day  Nicotine -  14 mG/24Hr(s) Patch 1 patch Transdermal daily      LABS:                        12.8   9.8   )-----------( 262      ( 09 Feb 2019 05:27 )             40.0     02-09    135  |  101  |  26.0<H>  ----------------------------<  106  4.5   |  20.0<L>  |  1.18    Ca    9.3      09 Feb 2019 05:27  Mg     2.1     02-09    TPro  7.9  /  Alb  3.8  /  TBili  0.5  /  DBili  x   /  AST  162<H>  /  ALT  27  /  AlkPhos  87  02-08      CARDIAC MARKERS ( 08 Feb 2019 12:50 )  x     / 2.04 ng/mL / 2823 U/L / x     / 96.9 ng/mL      Physical Examination:  VS: Temp: 97.9; HR: 74; BP: 112/55; RR: 29;  Sat: 99  General: Laying comfortably in bed; not in acute distress.    HEENT: Pupils equal, reactive to light.  Symmetric.  PULM: decrease breath sound b/l   CVS: Regular rate and rhythm, no murmurs, rubs, or gallops  ABD: Soft, nondistended, nontender, normoactive bowel sounds, no masses  EXT: R groin soft no hematoma; 1+ pitting edema   SKIN: Warm and well perfused, no rashes noted.  NEURO: Alert, oriented, interactive, nonfocal  : gallegos in place       < from: Cardiac Cath Lab - Adult (02.08.19 @ 18:08) >  VENTRICLES: LVEF 40%  CORONARY VESSELS: The coronary circulation is right dominant.  LM:   --  LM: Normal.  LAD:   --  LAD: Angiography showed minor luminal irregularities with no  flow limiting lesions.  CX:   --  Proximal circumflex: There was a 100 % stenosis.  RCA:   --  Mid RCA: There was a 100 % stenosis. There was poor collateral  blood supply to the distal myocardium.  COMPLICATIONS: There were no complications.  DIAGNOSTIC IMPRESSIONS: Acute occlusion of proximal LCX and  of RCA with  left to right collaterals. The LCX was treated with  thrombectomy/PTCA/STENT ([facility]) with good result.  DIAGNOSTIC RECOMMENDATIONS: ASA and Brilinta  INTERVENTIONAL IMPRESSIONS: Acute occlusion of proximal LCX and  of RCA  with left to right collaterals. The LCX was treated with  thrombectomy/PTCA/STENT ([facility]) with good result.  INTERVENTIONAL RECOMMENDATIONS: ASA and Brilinta

## 2019-02-11 LAB
ANION GAP SERPL CALC-SCNC: 13 MMOL/L — SIGNIFICANT CHANGE UP (ref 5–17)
BUN SERPL-MCNC: 45 MG/DL — HIGH (ref 8–20)
CALCIUM SERPL-MCNC: 9 MG/DL — SIGNIFICANT CHANGE UP (ref 8.6–10.2)
CHLORIDE SERPL-SCNC: 103 MMOL/L — SIGNIFICANT CHANGE UP (ref 98–107)
CO2 SERPL-SCNC: 20 MMOL/L — LOW (ref 22–29)
CREAT SERPL-MCNC: 1.49 MG/DL — HIGH (ref 0.5–1.3)
GLUCOSE SERPL-MCNC: 101 MG/DL — SIGNIFICANT CHANGE UP (ref 70–115)
HCT VFR BLD CALC: 31.9 % — LOW (ref 37–47)
HGB BLD-MCNC: 10.1 G/DL — LOW (ref 12–16)
MCHC RBC-ENTMCNC: 28.5 PG — SIGNIFICANT CHANGE UP (ref 27–31)
MCHC RBC-ENTMCNC: 31.7 G/DL — LOW (ref 32–36)
MCV RBC AUTO: 89.9 FL — SIGNIFICANT CHANGE UP (ref 81–99)
PLATELET # BLD AUTO: 206 K/UL — SIGNIFICANT CHANGE UP (ref 150–400)
POTASSIUM SERPL-MCNC: 4.2 MMOL/L — SIGNIFICANT CHANGE UP (ref 3.5–5.3)
POTASSIUM SERPL-SCNC: 4.2 MMOL/L — SIGNIFICANT CHANGE UP (ref 3.5–5.3)
RBC # BLD: 3.55 M/UL — LOW (ref 4.4–5.2)
RBC # FLD: 15.5 % — SIGNIFICANT CHANGE UP (ref 11–15.6)
SODIUM SERPL-SCNC: 136 MMOL/L — SIGNIFICANT CHANGE UP (ref 135–145)
WBC # BLD: 9 K/UL — SIGNIFICANT CHANGE UP (ref 4.8–10.8)
WBC # FLD AUTO: 9 K/UL — SIGNIFICANT CHANGE UP (ref 4.8–10.8)

## 2019-02-11 PROCEDURE — 99233 SBSQ HOSP IP/OBS HIGH 50: CPT

## 2019-02-11 PROCEDURE — 71045 X-RAY EXAM CHEST 1 VIEW: CPT | Mod: 26

## 2019-02-11 RX ORDER — LEVOTHYROXINE SODIUM 125 MCG
225 TABLET ORAL DAILY
Refills: 0 | Status: DISCONTINUED | OUTPATIENT
Start: 2019-02-11 | End: 2019-02-17

## 2019-02-11 RX ORDER — NICOTINE POLACRILEX 2 MG
1 GUM BUCCAL DAILY
Qty: 0 | Refills: 0 | Status: DISCONTINUED | OUTPATIENT
Start: 2019-02-11 | End: 2019-02-12

## 2019-02-11 RX ORDER — LEVOTHYROXINE SODIUM 125 MCG
200 TABLET ORAL DAILY
Qty: 0 | Refills: 0 | Status: DISCONTINUED | OUTPATIENT
Start: 2019-02-11 | End: 2019-02-11

## 2019-02-11 RX ORDER — METOPROLOL TARTRATE 50 MG
25 TABLET ORAL DAILY
Qty: 0 | Refills: 0 | Status: DISCONTINUED | OUTPATIENT
Start: 2019-02-11 | End: 2019-02-12

## 2019-02-11 RX ADMIN — LIDOCAINE 1 PATCH: 4 CREAM TOPICAL at 12:39

## 2019-02-11 RX ADMIN — LIDOCAINE 1 PATCH: 4 CREAM TOPICAL at 20:07

## 2019-02-11 RX ADMIN — Medication 5 MILLIGRAM(S): at 06:25

## 2019-02-11 RX ADMIN — Medication 90 MILLIGRAM(S): at 06:26

## 2019-02-11 RX ADMIN — Medication 25 MILLIGRAM(S): at 22:24

## 2019-02-11 RX ADMIN — Medication 0.25 MILLIGRAM(S): at 06:30

## 2019-02-11 RX ADMIN — Medication 25 MILLIGRAM(S): at 06:25

## 2019-02-11 RX ADMIN — Medication 1 PATCH: at 22:25

## 2019-02-11 RX ADMIN — HYDROMORPHONE HYDROCHLORIDE 4 MILLIGRAM(S): 2 INJECTION INTRAMUSCULAR; INTRAVENOUS; SUBCUTANEOUS at 22:24

## 2019-02-11 RX ADMIN — Medication 3 MILLILITER(S): at 16:12

## 2019-02-11 RX ADMIN — HYDROMORPHONE HYDROCHLORIDE 4 MILLIGRAM(S): 2 INJECTION INTRAMUSCULAR; INTRAVENOUS; SUBCUTANEOUS at 23:18

## 2019-02-11 RX ADMIN — Medication 3 MILLILITER(S): at 02:13

## 2019-02-11 RX ADMIN — Medication 25 MILLIGRAM(S): at 15:40

## 2019-02-11 RX ADMIN — Medication 3 MILLILITER(S): at 20:40

## 2019-02-11 RX ADMIN — Medication 125 MICROGRAM(S): at 06:26

## 2019-02-11 RX ADMIN — Medication 1 PATCH: at 12:41

## 2019-02-11 RX ADMIN — Medication 81 MILLIGRAM(S): at 12:36

## 2019-02-11 RX ADMIN — Medication 1 APPLICATION(S): at 06:20

## 2019-02-11 RX ADMIN — Medication 3 MILLILITER(S): at 08:24

## 2019-02-11 RX ADMIN — ATORVASTATIN CALCIUM 40 MILLIGRAM(S): 80 TABLET, FILM COATED ORAL at 22:24

## 2019-02-11 RX ADMIN — Medication 25 MILLIGRAM(S): at 12:36

## 2019-02-11 RX ADMIN — TICAGRELOR 90 MILLIGRAM(S): 90 TABLET ORAL at 06:26

## 2019-02-11 RX ADMIN — HYDROMORPHONE HYDROCHLORIDE 4 MILLIGRAM(S): 2 INJECTION INTRAMUSCULAR; INTRAVENOUS; SUBCUTANEOUS at 15:40

## 2019-02-11 RX ADMIN — PANTOPRAZOLE SODIUM 40 MILLIGRAM(S): 20 TABLET, DELAYED RELEASE ORAL at 06:25

## 2019-02-11 RX ADMIN — LIDOCAINE 1 PATCH: 4 CREAM TOPICAL at 23:54

## 2019-02-11 RX ADMIN — Medication 20 MILLIGRAM(S): at 06:25

## 2019-02-11 RX ADMIN — Medication 0.25 MILLIGRAM(S): at 18:16

## 2019-02-11 NOTE — PROGRESS NOTE ADULT - ASSESSMENT
74 yo female, PMHX HTN, HLD, hypothyroidism, metastatic Lung CA (received chemo and XRT), former smoker, presented with back pain found to have NSTEMI s/p PCI 1DES to Cx, acute hypoxemic respiratory failure secondary to acute pulmonary edema, acute systolic heart failure.     NSTEMI  Continue telemetry   Continue Brilinta 90 mg bid  Aspirin 81mg   Atorvastatin 40mg   c/w metoprolol    Acute hypoxemic respiratory failure 2/2 pulmonary edema   BiPAP as needed   O2 via NC as needed   Lasix 40mg     Acute systolic heart failure   Continue Lasix 40mg  Losartan 50mg   c/w metoprolol    COPD- stable  On home O2 PRN   Duoneb   Prednisone 5mg daily    Metastatic Lung CA   Continue pain medication      HTN   Continue Nifedipine XL 90mg     Synthroid   Pt compliant with Synthroid 200mcg at home, TSH 12, will increased dose to 225 mcg, check TSH as outpt    Tobacco use   Nicotine patch removed as not smoked since a few months & no signs of withdrawal    Supportive   DVT prophylaxis: SCD  GI prophylaxis: PPI 40mg   Diet: DASH

## 2019-02-11 NOTE — PROGRESS NOTE ADULT - SUBJECTIVE AND OBJECTIVE BOX
CHIEF COMPLAINT/INTERVAL HISTORY:    Patient is a 73y old  Female who presents with a chief complaint of back pain and SOB (11 Feb 2019 08:53)      HPI:  73 yr old patient with known lung cancer and Htn and CAD and CHF and ES-COPD now with persistent back pain - found to have EKG changes of acute ischemia in Ed - going for cath lab - on BIPAP, no fever , no phlegm, no (08 Feb 2019 17:35)      SUBJECTIVE & OBJECTIVE/ ROS: Pt seen and examined at bedside. Pt sitting in chair.  No overnight issues reported No chest pain, palpitations, sob, light headedness/dizziness, difficulty breathing/cough, fevers/chills, abdominal pain, n/v, diarrhea/constipation, dysuria or increased urinary frequency.     ICU Vital Signs Last 24 Hrs  T(C): 36.7 (11 Feb 2019 11:00), Max: 37.1 (11 Feb 2019 06:18)  T(F): 98 (11 Feb 2019 11:00), Max: 98.8 (11 Feb 2019 06:18)  HR: 72 (11 Feb 2019 16:15) (61 - 80)  BP: 100/60 (11 Feb 2019 12:35) (89/62 - 109/64)  BP(mean): --  ABP: --  ABP(mean): --  RR: 20 (11 Feb 2019 11:00) (19 - 20)  SpO2: 95% (11 Feb 2019 16:15) (93% - 97%)        MEDICATIONS  (STANDING):  ALBUTerol    90 MICROgram(s) HFA Inhaler 1 Puff(s) Inhalation every 4 hours  ALBUTerol/ipratropium for Nebulization 3 milliLiter(s) Nebulizer every 6 hours  aspirin  chewable 81 milliGRAM(s) Oral daily  atorvastatin 40 milliGRAM(s) Oral at bedtime  furosemide    Tablet 20 milliGRAM(s) Oral daily  influenza   Vaccine 0.5 milliLiter(s) IntraMuscular once  levothyroxine 200 MICROGram(s) Oral daily  lidocaine   Patch 1 Patch Transdermal daily  metoprolol succinate ER 25 milliGRAM(s) Oral daily  NIFEdipine XL 90 milliGRAM(s) Oral daily  pantoprazole    Tablet 40 milliGRAM(s) Oral before breakfast  predniSONE   Tablet 5 milliGRAM(s) Oral daily  pregabalin 25 milliGRAM(s) Oral every 8 hours  ticagrelor 90 milliGRAM(s) Oral two times a day  tiotropium 18 MICROgram(s) Capsule 1 Capsule(s) Inhalation daily  vitamin A &amp; D Ointment 1 Application(s) Topical three times a day    MEDICATIONS  (PRN):  ALPRAZolam 0.25 milliGRAM(s) Oral three times a day PRN anxiety  cyclobenzaprine 10 milliGRAM(s) Oral three times a day PRN Muscle Spasm  HYDROmorphone   Tablet 4 milliGRAM(s) Oral every 6 hours PRN Severe Pain (7 - 10)  ondansetron Injectable 4 milliGRAM(s) IV Push every 8 hours PRN Nausea and/or Vomiting  senna 2 Tablet(s) Oral at bedtime PRN Constipation      LABS:                        10.1   9.0   )-----------( 206      ( 11 Feb 2019 06:02 )             31.9     02-11    136  |  103  |  45.0<H>  ----------------------------<  101  4.2   |  20.0<L>  |  1.49<H>    Ca    9.0      11 Feb 2019 06:02  Phos  2.9     02-10  Mg     2.0     02-10            CAPILLARY BLOOD GLUCOSE          RECENT CULTURES:      RADIOLOGY & ADDITIONAL TESTS:      PHYSICAL EXAM:    General: Laying comfortably in bed; not in acute distress.    HEENT: Pupils equal, reactive to light.  Symmetric.  PULM: decrease breath sound b/l   CVS: Regular rate and rhythm, no murmurs, rubs, or gallops  ABD: Soft, nondistended, nontender, normoactive bowel sounds, no masses  EXT: R groin soft no hematoma; 1+ pitting edema   SKIN: Warm and well perfused, no rashes noted.  NEURO: Alert, oriented, interactive, nonfocal
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
## 2019-02-12 DIAGNOSIS — N17.9 ACUTE KIDNEY FAILURE, UNSPECIFIED: ICD-10-CM

## 2019-02-12 DIAGNOSIS — I50.21 ACUTE SYSTOLIC (CONGESTIVE) HEART FAILURE: ICD-10-CM

## 2019-02-12 LAB
ALBUMIN SERPL ELPH-MCNC: 3.1 G/DL — LOW (ref 3.3–5.2)
ALP SERPL-CCNC: 227 U/L — HIGH (ref 40–120)
ALT FLD-CCNC: 40 U/L — HIGH
ANION GAP SERPL CALC-SCNC: 16 MMOL/L — SIGNIFICANT CHANGE UP (ref 5–17)
ANION GAP SERPL CALC-SCNC: 16 MMOL/L — SIGNIFICANT CHANGE UP (ref 5–17)
APTT BLD: 24.9 SEC — LOW (ref 27.5–36.3)
AST SERPL-CCNC: 41 U/L — HIGH
BASE EXCESS BLDA CALC-SCNC: -1.8 MMOL/L — SIGNIFICANT CHANGE UP (ref -2–2)
BASOPHILS # BLD AUTO: 0 K/UL — SIGNIFICANT CHANGE UP (ref 0–0.2)
BASOPHILS NFR BLD AUTO: 0.3 % — SIGNIFICANT CHANGE UP (ref 0–2)
BILIRUB SERPL-MCNC: 1.7 MG/DL — SIGNIFICANT CHANGE UP (ref 0.4–2)
BUN SERPL-MCNC: 54 MG/DL — HIGH (ref 8–20)
BUN SERPL-MCNC: 56 MG/DL — HIGH (ref 8–20)
CALCIUM SERPL-MCNC: 9.2 MG/DL — SIGNIFICANT CHANGE UP (ref 8.6–10.2)
CALCIUM SERPL-MCNC: 9.4 MG/DL — SIGNIFICANT CHANGE UP (ref 8.6–10.2)
CHLORIDE SERPL-SCNC: 103 MMOL/L — SIGNIFICANT CHANGE UP (ref 98–107)
CHLORIDE SERPL-SCNC: 106 MMOL/L — SIGNIFICANT CHANGE UP (ref 98–107)
CO2 SERPL-SCNC: 20 MMOL/L — LOW (ref 22–29)
CO2 SERPL-SCNC: 21 MMOL/L — LOW (ref 22–29)
CREAT SERPL-MCNC: 1.54 MG/DL — HIGH (ref 0.5–1.3)
CREAT SERPL-MCNC: 1.6 MG/DL — HIGH (ref 0.5–1.3)
EOSINOPHIL # BLD AUTO: 0.1 K/UL — SIGNIFICANT CHANGE UP (ref 0–0.5)
EOSINOPHIL NFR BLD AUTO: 1.4 % — SIGNIFICANT CHANGE UP (ref 0–6)
GAS PNL BLDA: SIGNIFICANT CHANGE UP
GAS PNL BLDA: SIGNIFICANT CHANGE UP
GLUCOSE BLDC GLUCOMTR-MCNC: 136 MG/DL — HIGH (ref 70–99)
GLUCOSE SERPL-MCNC: 101 MG/DL — SIGNIFICANT CHANGE UP (ref 70–115)
GLUCOSE SERPL-MCNC: 108 MG/DL — SIGNIFICANT CHANGE UP (ref 70–115)
HCO3 BLDA-SCNC: 23 MMOL/L — SIGNIFICANT CHANGE UP (ref 20–26)
HCT VFR BLD CALC: 33.6 % — LOW (ref 37–47)
HGB BLD-MCNC: 11 G/DL — LOW (ref 12–16)
INR BLD: 1.21 RATIO — HIGH (ref 0.88–1.16)
LYMPHOCYTES # BLD AUTO: 0.7 K/UL — LOW (ref 1–4.8)
LYMPHOCYTES # BLD AUTO: 9.1 % — LOW (ref 20–55)
MAGNESIUM SERPL-MCNC: 2.3 MG/DL — SIGNIFICANT CHANGE UP (ref 1.6–2.6)
MCHC RBC-ENTMCNC: 29.5 PG — SIGNIFICANT CHANGE UP (ref 27–31)
MCHC RBC-ENTMCNC: 32.7 G/DL — SIGNIFICANT CHANGE UP (ref 32–36)
MCV RBC AUTO: 90.1 FL — SIGNIFICANT CHANGE UP (ref 81–99)
MONOCYTES # BLD AUTO: 1.1 K/UL — HIGH (ref 0–0.8)
MONOCYTES NFR BLD AUTO: 13.8 % — HIGH (ref 3–10)
MYOGLOBIN SERPL-MCNC: 180 NG/ML — HIGH (ref 25–58)
NEUTROPHILS # BLD AUTO: 5.8 K/UL — SIGNIFICANT CHANGE UP (ref 1.8–8)
NEUTROPHILS NFR BLD AUTO: 75.1 % — HIGH (ref 37–73)
NT-PROBNP SERPL-SCNC: 7375 PG/ML — HIGH (ref 0–300)
PCO2 BLDA: 35 MMHG — SIGNIFICANT CHANGE UP (ref 35–45)
PH BLDA: 7.42 — SIGNIFICANT CHANGE UP (ref 7.35–7.45)
PHOSPHATE SERPL-MCNC: 3.1 MG/DL — SIGNIFICANT CHANGE UP (ref 2.4–4.7)
PLATELET # BLD AUTO: 265 K/UL — SIGNIFICANT CHANGE UP (ref 150–400)
PO2 BLDA: 69 MMHG — LOW (ref 83–108)
POTASSIUM SERPL-MCNC: 3.6 MMOL/L — SIGNIFICANT CHANGE UP (ref 3.5–5.3)
POTASSIUM SERPL-MCNC: 4.2 MMOL/L — SIGNIFICANT CHANGE UP (ref 3.5–5.3)
POTASSIUM SERPL-SCNC: 3.6 MMOL/L — SIGNIFICANT CHANGE UP (ref 3.5–5.3)
POTASSIUM SERPL-SCNC: 4.2 MMOL/L — SIGNIFICANT CHANGE UP (ref 3.5–5.3)
PROT SERPL-MCNC: 6.5 G/DL — LOW (ref 6.6–8.7)
PROTHROM AB SERPL-ACNC: 14 SEC — HIGH (ref 10–12.9)
RBC # BLD: 3.73 M/UL — LOW (ref 4.4–5.2)
RBC # FLD: 15.5 % — SIGNIFICANT CHANGE UP (ref 11–15.6)
SAO2 % BLDA: 96 % — SIGNIFICANT CHANGE UP (ref 95–99)
SODIUM SERPL-SCNC: 140 MMOL/L — SIGNIFICANT CHANGE UP (ref 135–145)
SODIUM SERPL-SCNC: 142 MMOL/L — SIGNIFICANT CHANGE UP (ref 135–145)
TROPONIN T SERPL-MCNC: 11.93 NG/ML — HIGH (ref 0–0.06)
TROPONIN T SERPL-MCNC: 12.38 NG/ML — HIGH (ref 0–0.06)
TROPONIN T SERPL-MCNC: 12.57 NG/ML — HIGH (ref 0–0.06)
WBC # BLD: 7.8 K/UL — SIGNIFICANT CHANGE UP (ref 4.8–10.8)
WBC # FLD AUTO: 7.8 K/UL — SIGNIFICANT CHANGE UP (ref 4.8–10.8)

## 2019-02-12 PROCEDURE — 93010 ELECTROCARDIOGRAM REPORT: CPT

## 2019-02-12 PROCEDURE — 99233 SBSQ HOSP IP/OBS HIGH 50: CPT

## 2019-02-12 PROCEDURE — 99497 ADVNCD CARE PLAN 30 MIN: CPT

## 2019-02-12 PROCEDURE — 99222 1ST HOSP IP/OBS MODERATE 55: CPT

## 2019-02-12 PROCEDURE — 99291 CRITICAL CARE FIRST HOUR: CPT

## 2019-02-12 PROCEDURE — 71045 X-RAY EXAM CHEST 1 VIEW: CPT | Mod: 26

## 2019-02-12 RX ORDER — MORPHINE SULFATE 50 MG/1
2 CAPSULE, EXTENDED RELEASE ORAL ONCE
Qty: 0 | Refills: 0 | Status: DISCONTINUED | OUTPATIENT
Start: 2019-02-12 | End: 2019-02-14

## 2019-02-12 RX ORDER — MILRINONE LACTATE 1 MG/ML
0.38 INJECTION, SOLUTION INTRAVENOUS
Qty: 20 | Refills: 0 | Status: DISCONTINUED | OUTPATIENT
Start: 2019-02-12 | End: 2019-02-13

## 2019-02-12 RX ORDER — HEPARIN SODIUM 5000 [USP'U]/ML
5300 INJECTION INTRAVENOUS; SUBCUTANEOUS EVERY 6 HOURS
Qty: 0 | Refills: 0 | Status: DISCONTINUED | OUTPATIENT
Start: 2019-02-12 | End: 2019-02-13

## 2019-02-12 RX ORDER — FUROSEMIDE 40 MG
40 TABLET ORAL ONCE
Qty: 0 | Refills: 0 | Status: COMPLETED | OUTPATIENT
Start: 2019-02-12 | End: 2019-02-12

## 2019-02-12 RX ORDER — FUROSEMIDE 40 MG
40 TABLET ORAL EVERY 12 HOURS
Qty: 0 | Refills: 0 | Status: DISCONTINUED | OUTPATIENT
Start: 2019-02-12 | End: 2019-02-13

## 2019-02-12 RX ORDER — HEPARIN SODIUM 5000 [USP'U]/ML
5000 INJECTION INTRAVENOUS; SUBCUTANEOUS ONCE
Qty: 0 | Refills: 0 | Status: COMPLETED | OUTPATIENT
Start: 2019-02-12 | End: 2019-02-12

## 2019-02-12 RX ORDER — HEPARIN SODIUM 5000 [USP'U]/ML
INJECTION INTRAVENOUS; SUBCUTANEOUS
Qty: 25000 | Refills: 0 | Status: DISCONTINUED | OUTPATIENT
Start: 2019-02-12 | End: 2019-02-13

## 2019-02-12 RX ORDER — OXYCODONE AND ACETAMINOPHEN 5; 325 MG/1; MG/1
1 TABLET ORAL EVERY 6 HOURS
Qty: 0 | Refills: 0 | Status: DISCONTINUED | OUTPATIENT
Start: 2019-02-12 | End: 2019-02-17

## 2019-02-12 RX ORDER — POTASSIUM CHLORIDE 20 MEQ
40 PACKET (EA) ORAL ONCE
Qty: 0 | Refills: 0 | Status: COMPLETED | OUTPATIENT
Start: 2019-02-12 | End: 2019-02-12

## 2019-02-12 RX ADMIN — Medication 3 MILLILITER(S): at 03:27

## 2019-02-12 RX ADMIN — MILRINONE LACTATE 9.7 MICROGRAM(S)/KG/MIN: 1 INJECTION, SOLUTION INTRAVENOUS at 10:19

## 2019-02-12 RX ADMIN — Medication 25 MILLIGRAM(S): at 06:35

## 2019-02-12 RX ADMIN — TICAGRELOR 90 MILLIGRAM(S): 90 TABLET ORAL at 20:42

## 2019-02-12 RX ADMIN — HEPARIN SODIUM 5000 UNIT(S): 5000 INJECTION INTRAVENOUS; SUBCUTANEOUS at 21:54

## 2019-02-12 RX ADMIN — Medication 40 MILLIGRAM(S): at 23:07

## 2019-02-12 RX ADMIN — Medication 1 PATCH: at 22:05

## 2019-02-12 RX ADMIN — Medication 5 MILLIGRAM(S): at 06:34

## 2019-02-12 RX ADMIN — Medication 40 MILLIEQUIVALENT(S): at 23:33

## 2019-02-12 RX ADMIN — Medication 3 MILLILITER(S): at 08:22

## 2019-02-12 RX ADMIN — Medication 25 MILLIGRAM(S): at 20:43

## 2019-02-12 RX ADMIN — LIDOCAINE 1 PATCH: 4 CREAM TOPICAL at 23:10

## 2019-02-12 RX ADMIN — Medication 1 APPLICATION(S): at 06:31

## 2019-02-12 RX ADMIN — HYDROMORPHONE HYDROCHLORIDE 4 MILLIGRAM(S): 2 INJECTION INTRAMUSCULAR; INTRAVENOUS; SUBCUTANEOUS at 06:35

## 2019-02-12 RX ADMIN — Medication 25 MILLIGRAM(S): at 06:34

## 2019-02-12 RX ADMIN — Medication 81 MILLIGRAM(S): at 20:43

## 2019-02-12 RX ADMIN — Medication 225 MICROGRAM(S): at 06:34

## 2019-02-12 RX ADMIN — Medication 40 MILLIGRAM(S): at 08:17

## 2019-02-12 RX ADMIN — LIDOCAINE 1 PATCH: 4 CREAM TOPICAL at 12:44

## 2019-02-12 RX ADMIN — PANTOPRAZOLE SODIUM 40 MILLIGRAM(S): 20 TABLET, DELAYED RELEASE ORAL at 06:34

## 2019-02-12 RX ADMIN — LIDOCAINE 1 PATCH: 4 CREAM TOPICAL at 22:02

## 2019-02-12 RX ADMIN — Medication 0.25 MILLIGRAM(S): at 06:34

## 2019-02-12 RX ADMIN — Medication 1 PATCH: at 22:04

## 2019-02-12 RX ADMIN — Medication 20 MILLIGRAM(S): at 06:35

## 2019-02-12 RX ADMIN — TICAGRELOR 90 MILLIGRAM(S): 90 TABLET ORAL at 06:34

## 2019-02-12 RX ADMIN — HEPARIN SODIUM 1000 UNIT(S)/HR: 5000 INJECTION INTRAVENOUS; SUBCUTANEOUS at 21:47

## 2019-02-12 RX ADMIN — ATORVASTATIN CALCIUM 40 MILLIGRAM(S): 80 TABLET, FILM COATED ORAL at 20:43

## 2019-02-12 RX ADMIN — Medication 3 MILLILITER(S): at 14:45

## 2019-02-12 RX ADMIN — Medication 3 MILLILITER(S): at 20:02

## 2019-02-12 NOTE — PROGRESS NOTE ADULT - ASSESSMENT
Assessment  Acute systolic HF insetting of LV dysfunction Ef 35-40%  and mod MR  EDER post cath  HFrEF  s/p recent Cx PCI with total RCA  Htn  COPD  met lung cancer s/p chemo      Rec  Transfer ICU  IV lasix  cont lopressor/statin  empiric nitrates preload reduction  hydralazine or procardia for BP ( ACE on hold)  trend creat  echo to exclude new WMA  trend trop/ECG    critical care 35 min Assessment  Acute systolic HF insetting of LV dysfunction Ef 35-40%  and mod MR  EDER post cath  HFrEF  s/p recent Cx PCI with total RCA  Htn  COPD  met lung cancer s/p chemo      Rec  Transfer ICU  IV lasix  cont lopressor/statin  empiric nitrates preload reduction  hydralazine or procardia for BP ( ACE on hold)  trend creat  echo to exclude new WMA  trend trop/ECG    Addendum:  Stat bedside echo performed  LV dilated with inferior posterior akinesia and lateral hypokinesia EF 25-30%  Severe eccentric MR, functional MR no flail  normal RV function      Above c/w ischemic CM and severe functional MR and acute systolic HF insetting of EDER post cath  Will cont BIPAP, IV Lasix  IV milrinone  Hold procardia and lopressor while on milrinone    Prognosis guarded, spoke at length with family  Time spent 40 min

## 2019-02-12 NOTE — PROGRESS NOTE ADULT - SUBJECTIVE AND OBJECTIVE BOX
CHIEF COMPLAINT/INTERVAL HISTORY:    Patient is a 73y old  Female who presents with a chief complaint of back pain and SOB (12 Feb 2019 08:58)      HPI:  73 yr old patient with known lung cancer and Htn and CAD and CHF and ES-COPD now with persistent back pain - found to have EKG changes of acute ischemia in Ed - going for cath lab - on BIPAP, no fever , no phlegm, no (08 Feb 2019 17:35)      SUBJECTIVE & OBJECTIVE/ ROS: Pt seen and examined at bedside during RRT. Rapid response team called because of desaturation to 84% on 5L O2.   Pt was already on Bipap and Duoneb tx w/ 97% saturation. Pt received 4mg Dilaudid and 20mg lasix PO this morning.    Pt said her breathing felt tight. Denied  CP or palpitations or lightheadedness        ICU Vital Signs Last 24 Hrs  T(C): 36.9 (12 Feb 2019 10:00), Max: 36.9 (11 Feb 2019 16:37)  T(F): 98.4 (12 Feb 2019 10:00), Max: 98.5 (11 Feb 2019 16:37)  HR: 65 (12 Feb 2019 11:00) (64 - 87)  BP: 89/51 (12 Feb 2019 11:00) (89/51 - 100/60)  BP(mean): 66 (12 Feb 2019 11:00) (66 - 70)  ABP: --  ABP(mean): --  RR: 19 (12 Feb 2019 11:00) (18 - 20)  SpO2: 95% (12 Feb 2019 11:00) (90% - 98%)        MEDICATIONS  (STANDING):  ALBUTerol    90 MICROgram(s) HFA Inhaler 1 Puff(s) Inhalation every 4 hours  ALBUTerol/ipratropium for Nebulization 3 milliLiter(s) Nebulizer every 6 hours  aspirin  chewable 81 milliGRAM(s) Oral daily  atorvastatin 40 milliGRAM(s) Oral at bedtime  influenza   Vaccine 0.5 milliLiter(s) IntraMuscular once  levothyroxine 225 MICROGram(s) Oral daily  lidocaine   Patch 1 Patch Transdermal daily  milrinone Infusion 0.375 MICROgram(s)/kG/Min (9.698 mL/Hr) IV Continuous <Continuous>  nicotine -  14 mG/24Hr(s) Patch 1 patch Transdermal daily  pantoprazole    Tablet 40 milliGRAM(s) Oral before breakfast  predniSONE   Tablet 5 milliGRAM(s) Oral daily  pregabalin 25 milliGRAM(s) Oral every 8 hours  ticagrelor 90 milliGRAM(s) Oral two times a day  tiotropium 18 MICROgram(s) Capsule 1 Capsule(s) Inhalation daily  vitamin A &amp; D Ointment 1 Application(s) Topical three times a day    MEDICATIONS  (PRN):  ALPRAZolam 0.25 milliGRAM(s) Oral three times a day PRN anxiety  cyclobenzaprine 10 milliGRAM(s) Oral three times a day PRN Muscle Spasm  ondansetron Injectable 4 milliGRAM(s) IV Push every 8 hours PRN Nausea and/or Vomiting  oxyCODONE    5 mG/acetaminophen 325 mG 1 Tablet(s) Oral every 6 hours PRN Severe Pain (7 - 10)  senna 2 Tablet(s) Oral at bedtime PRN Constipation      LABS:                        11.0   7.8   )-----------( 265      ( 12 Feb 2019 06:17 )             33.6     02-12    140  |  103  |  56.0<H>  ----------------------------<  101  4.2   |  21.0<L>  |  1.60<H>    Ca    9.4      12 Feb 2019 06:17  Phos  3.1     02-12  Mg     2.3     02-12            CAPILLARY BLOOD GLUCOSE      POCT Blood Glucose.: 136 mg/dL (12 Feb 2019 08:04)      RECENT CULTURES:      RADIOLOGY & ADDITIONAL TESTS:      PHYSICAL EXAM:    General: moderate resp distress, no accessory muscle use, on BIPAP  HEENT: Pupils equal, reactive to light.  Symmetric.  PULM: b/l diffuse wheezing  CVS: Regular rate and rhythm, no murmurs, rubs, or gallops  ABD: Soft, nondistended, nontender, normoactive bowel sounds, no masses  EXT: R groin soft no hematoma; 1+ pitting edema   SKIN: Warm and well perfused, no rashes noted.  NEURO: Alert, oriented, interactive, nonfocal

## 2019-02-12 NOTE — PROGRESS NOTE ADULT - SUBJECTIVE AND OBJECTIVE BOX
[facility] - [facility], THE [facility]                                   [address] [place] [address]                                                      PHONE: [phone]                                                         FAX: [phone]  [email or web address]  ---------------------------------------------------------------------------------------------------------------------------------    Overnight events/patient complaints: acute resp failure req BIPAP, CXR c/w CHF, no sig chest pain, ECG SR with ST depressions V1 -v3 no sig change from prior      No Known Allergies    MEDICATIONS  (STANDING):  ALBUTerol    90 MICROgram(s) HFA Inhaler 1 Puff(s) Inhalation every 4 hours  ALBUTerol/ipratropium for Nebulization 3 milliLiter(s) Nebulizer every 6 hours  aspirin  chewable 81 milliGRAM(s) Oral daily  atorvastatin 40 milliGRAM(s) Oral at bedtime  influenza   Vaccine 0.5 milliLiter(s) IntraMuscular once  levothyroxine 225 MICROGram(s) Oral daily  lidocaine   Patch 1 Patch Transdermal daily  metoprolol succinate ER 25 milliGRAM(s) Oral daily  nicotine -  14 mG/24Hr(s) Patch 1 patch Transdermal daily  NIFEdipine XL 90 milliGRAM(s) Oral daily  pantoprazole    Tablet 40 milliGRAM(s) Oral before breakfast  predniSONE   Tablet 5 milliGRAM(s) Oral daily  pregabalin 25 milliGRAM(s) Oral every 8 hours  ticagrelor 90 milliGRAM(s) Oral two times a day  tiotropium 18 MICROgram(s) Capsule 1 Capsule(s) Inhalation daily  vitamin A &amp; D Ointment 1 Application(s) Topical three times a day    MEDICATIONS  (PRN):  ALPRAZolam 0.25 milliGRAM(s) Oral three times a day PRN anxiety  cyclobenzaprine 10 milliGRAM(s) Oral three times a day PRN Muscle Spasm  ondansetron Injectable 4 milliGRAM(s) IV Push every 8 hours PRN Nausea and/or Vomiting  oxyCODONE    5 mG/acetaminophen 325 mG 1 Tablet(s) Oral every 6 hours PRN Severe Pain (7 - 10)  senna 2 Tablet(s) Oral at bedtime PRN Constipation      Vital Signs Last 24 Hrs  T(C): 36.9 (12 Feb 2019 06:12), Max: 36.9 (11 Feb 2019 16:37)  T(F): 98.5 (12 Feb 2019 06:12), Max: 98.5 (11 Feb 2019 16:37)  HR: 85 (12 Feb 2019 08:25) (68 - 87)  BP: 92/50 (12 Feb 2019 07:52) (90/54 - 100/60)  BP(mean): --  RR: 20 (12 Feb 2019 06:12) (19 - 20)  SpO2: 95% (12 Feb 2019 08:25) (90% - 96%)  Daily     Daily   ICU Vital Signs Last 24 Hrs  JOEY ASHWIN  I&O's Detail    11 Feb 2019 07:01  -  12 Feb 2019 07:00  --------------------------------------------------------  IN:    Oral Fluid: 480 mL  Total IN: 480 mL    OUT:    Voided: 100 mL  Total OUT: 100 mL    Total NET: 380 mL        I&O's Summary    11 Feb 2019 07:01  -  12 Feb 2019 07:00  --------------------------------------------------------  IN: 480 mL / OUT: 100 mL / NET: 380 mL      Drug Dosing Weight  JOEY CHRISTOPHER      PHYSICAL EXAM:  General: Appears well developed, well nourished alert and cooperative.  HEENT: Head; normocephalic, atraumatic.  Eyes: Pupils reactive, cornea wnl.  Neck: Supple, no nodes adenopathy, no NVD or carotid bruit or thyromegaly.  CARDIOVASCULAR: Normal S1 and S2, No murmur, rub, gallop or lift.   LUNGS: basilar rales  ABDOMEN: Soft, nontender without mass or organomegaly. bowel sounds normoactive.  EXTREMITIES: No clubbing, cyanosis or edema. Distal pulses wnl.   SKIN: warm and dry with normal turgor.  NEURO: Alert/oriented x 3/normal motor exam. No pathologic reflexes.    PSYCH: normal affect.        LABS:                        11.0   7.8   )-----------( 265      ( 12 Feb 2019 06:17 )             33.6     02-12    140  |  103  |  56.0<H>  ----------------------------<  101  4.2   |  21.0<L>  |  1.60<H>    Ca    9.4      12 Feb 2019 06:17  Phos  3.1     02-12  Mg     2.3     02-12      JOEY CHRISTOPHER            RADIOLOGY & ADDITIONAL STUDIES:    INTERPRETATION OF TELEMETRY (personally reviewed):    ECG:    ECHO:    STRESS TEST:    CARDIAC CATHETERIZATION:< from: Cardiac Cath Lab - Adult (02.08.19 @ 18:08) >  VENTRICLES: LVEF 40%  CORONARY VESSELS: The coronary circulation is right dominant.  LM:   --  LM: Normal.  LAD:   --  LAD: Angiography showed minor luminal irregularities with no  flow limiting lesions.  CX:   --  Proximal circumflex: There was a 100 % stenosis.  RCA:   --  Mid RCA: There was a 100 % stenosis. There was poor collateral  blood supply to the distal myocardium.  COMPLICATIONS: There were no complications.  DIAGNOSTIC IMPRESSIONS: Acute occlusion of proximal LCX and  of RCA with  left to right collaterals. The LCX was treated with  thrombectomy/PTCA/STENT ([facility]) with good result.  DIAGNOSTIC RECOMMENDATIONS: ASA and Brilinta  INTERVENTIONAL IMPRESSIONS: Acute occlusion of proximal LCX and  of RCA  with left to right collaterals. The LCX was treated with  thrombectomy/PTCA/STENT ([facility]) with good result.  INTERVENTIONAL RECOMMENDATIONS: Magdalena  Prepared and signed by    < end of copied text >

## 2019-02-12 NOTE — CONSULT NOTE ADULT - SUBJECTIVE AND OBJECTIVE BOX
PALLIATIVE CONSULT    CC: Patient is a 73y old  Female who presents with a chief complaint of back pain and SOB (12 Feb 2019 13:48)    HPI:   Mrs. Cam is a 73 year old female with metastatic lung cancer (previously on chemo and RT), HTN, CAD, HFrEF (recent echo EF 25-40%) and ES COPD, O2 dependent at home admitted for persistent back pain and found to have NSTEMI. S/P cardiac catheterization on 2/8/19, Acute occlusion of proximal LCX and  of RCA with left to right collaterals; s/p thrombectomy and OSMAR. Post-op monitored in ICU and subsequently downgraded. Course complicated by acute decompensated heart failure and acute respiratory failure requiring BIPAP and diuretics. RRT called this morning for oxygen desaturation on 5L NC, placed on bipap and upgraded to ICU. GOC discussion held by hospitalist this morning with pt and her family given her overall guarded prognosis and pt/family opting for DNR/DNI. Palliative care consulted to provide support and assist with ongoing goc.     Pt seen and examined earlier this afternoon. She was tired, remains on bipap.  at bedside. Daughter/HCP left shortly ago.     Present Symptoms:   Dyspnea: Yes, on bipap  Nausea/Vomiting: No  Anxiety:  Yes   Fatigue: Yes   Pain: No          Limited ROS due to BIPAP    PERTINENT PMH REVIEWED: Yes     PAST MEDICAL & SURGICAL HISTORY:  Carcinoma: metastic stage 4 with stephanie to the iliac bone, colon, and lung  Iliac crest bone pain: cancer, right  Anxiety  Hypothyroid  Hypertension  No significant past surgical history      SOCIAL HISTORY:    Admitted from:  home    Baseline ADLs (prior to admission): some [place]   Karnofsky:  50-60%    Surrogate/HCP/Guardian: Phone#:  - next of kin is [phone]    ADVANCE DIRECTIVES:   DNR YES NO  Completed on:                     MOLST  YES NO   Completed on:  Living Will  YES NO   Completed on:    FAMILY HISTORY:  Unable to obtain at this time due to pt being on bipap       Allergies  No Known Allergies  Intolerances        MEDICATIONS  (STANDING):  ALBUTerol    90 MICROgram(s) HFA Inhaler 1 Puff(s) Inhalation every 4 hours  ALBUTerol/ipratropium for Nebulization 3 milliLiter(s) Nebulizer every 6 hours  aspirin  chewable 81 milliGRAM(s) Oral daily  atorvastatin 40 milliGRAM(s) Oral at bedtime  influenza   Vaccine 0.5 milliLiter(s) IntraMuscular once  levothyroxine 225 MICROGram(s) Oral daily  lidocaine   Patch 1 Patch Transdermal daily  milrinone Infusion 0.375 MICROgram(s)/kG/Min (9.698 mL/Hr) IV Continuous <Continuous>  pantoprazole    Tablet 40 milliGRAM(s) Oral before breakfast  predniSONE   Tablet 5 milliGRAM(s) Oral daily  pregabalin 25 milliGRAM(s) Oral every 8 hours  ticagrelor 90 milliGRAM(s) Oral two times a day  tiotropium 18 MICROgram(s) Capsule 1 Capsule(s) Inhalation daily  vitamin A &amp; D Ointment 1 Application(s) Topical three times a day    MEDICATIONS  (PRN):  ALPRAZolam 0.25 milliGRAM(s) Oral three times a day PRN anxiety  cyclobenzaprine 10 milliGRAM(s) Oral three times a day PRN Muscle Spasm  ondansetron Injectable 4 milliGRAM(s) IV Push every 8 hours PRN Nausea and/or Vomiting  oxyCODONE    5 mG/acetaminophen 325 mG 1 Tablet(s) Oral every 6 hours PRN Severe Pain (7 - 10)  senna 2 Tablet(s) Oral at bedtime PRN Constipation      PHYSICAL EXAM:    Vital Signs Last 24 Hrs  T(C): 36.8 (12 Feb 2019 14:00), Max: 36.9 (11 Feb 2019 16:37)  T(F): 98.2 (12 Feb 2019 14:00), Max: 98.5 (11 Feb 2019 16:37)  HR: 71 (12 Feb 2019 14:46) (64 - 87)  BP: 81/49 (12 Feb 2019 14:30) (81/49 - 101/57)  BP(mean): 61 (12 Feb 2019 14:30) (61 - 74)  RR: 31 (12 Feb 2019 14:30) (17 - 31)  SpO2: 97% (12 Feb 2019 14:46) (85% - 98%)    General: Resting comfortably. No acute distress. drowsy  HEENT: on BIPAP  Lungs: coarse breath sounds. non-labored. secretions  CV: +s1/s2. Regular rate and rhythm.    GI:+ bowel sound. abdomen soft, non-tender  MSK: Moves all 4 extremities.  No cyanosis or edema. +weakness.   Neuro: Awake but drowsy; answers some questions.   Skin: warm and dry    LABS:                        11.0   7.8   )-----------( 265      ( 12 Feb 2019 06:17 )             33.6     02-12    140  |  103  |  56.0<H>  ----------------------------<  101  4.2   |  21.0<L>  |  1.60<H>    Ca    9.4      12 Feb 2019 06:17  Phos  3.1     02-12  Mg     2.3     02-12      I&O's Summary    11 Feb 2019 07:01  -  12 Feb 2019 07:00  --------------------------------------------------------  IN: 480 mL / OUT: 100 mL / NET: 380 mL    12 Feb 2019 07:01  -  12 Feb 2019 15:07  --------------------------------------------------------  IN: 48.5 mL / OUT: 1450 mL / NET: -1401.5 mL    RADIOLOGY & ADDITIONAL STUDIES:     CTA C/A/P 2/8/19  FINDINGS:    There are atherosclerotic changes involving the thoracic aorta. No   evidence of aortic aneurysm or dissection. Atherosclerotic changes   involving the abdominal aorta. No evidence of dissection. 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 Aneurysm is   partially filled with thrombus. No evidence for obstruction of the   external iliac arteries were from her arteries.    The celiac artery, superior mesenteric artery, and inferior mesenteric   artery are patent. There is a single renal artery on each side, patent.    An aberrant right subclavian artery is demonstrated, a congenital   variant. Precontrast images imaging no evidence of intramural hematoma of   the thoracic or upper abdominal aorta.    No evidence of mediastinal or hilar lymphadenopathy. There are small   bilateral pleural effusions, right larger than left. No evidence of a   pericardial effusion. No evidence of axillary adenopathy. No evidence of   pulmonary emboli.    The left lung is clear. There is a sharply circumscribed noncalcified   nodule in the anterior aspect of the right lower lobe within the right   lateral costophrenic angle. This abnormality is unchanged in size and   appearance since 12/19/2015, measuring 1.5 x 1.2 cm compared with 1.6 x   1.3 cm on the prior examination.    Intra-abdominal structures are evaluated during the arterial phase which   is suboptimal.    The liver is unremarkable. Calcifications noted in the wall of the   gallbladder, unchanged from the prior study.    The spleen is not enlarged and demonstrates no focal abnormality. The   pancreatic contour is unremarkable without evidence of mass, inflammation   or ductal dilatation. The adrenal glands demonstrate normal size and   contour.    The right kidney demonstrates a cortical cyst but no evidence of   hydronephrosis. The left kidney is unremarkable.    No evidence of retroperitoneal or pelvic lymphadenopathy.    The bladder, uterus, and adnexal regions demonstrate no abnormality.    Mild colonic diverticulosis. No evidence of diverticulitis. The appendix   is visualized and is normal.    There is a lucency involving the anterior right iliac wing. This   represents the residua of a frankly destructive osseous lesion at this   site on a prior CT of 7/10/2015. There are degenerative changes in the   spine.    IMPRESSION:   No evidence of aortic dissection or aneurysm.  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  Sharply circumscribed noncalcified nodule in the right lower lobe   demonstrating no change in size or appearance since 12/19/2015.  Calcifications noted in the wall of the gallbladder, unchanged.  Lucency in the right iliac wing representing the residua of a destructive   lesion seen at this site on 7/10/2015.     XR LS Spine 2/8/19  IMPRESSION:   Severe degenerative spondylosis without fracture. No   fracture, gross osteolytic or blastic lesion.  If pain persist despite conservative therapy a metastatic lesion or disc   herniation causing central canal or foraminal nerve root compression   clinically suspected further assessment with CT or MRI recommended.       CXR 2/8/19  IMPRESSION: Moderate CHF. Cardiomegaly.      CXR 2/12/19  Findings:     Bilateral diffuse interstitial densities and central   pulmonic congestion is observed. Subsegmental atelectasis left lower lobe   obscuring left diaphragm. Left pleural effusion. Heart size is   unremarkable. The thorax is normal for age.    Impression: Diffuse interstitial densities and congestion unchanged.   Subsegmental atelectasis left lower lobe and increased small left pleural   effusion.    TTE 2/9/19  Left Ventricle: The left ventricular internal cavity size is normal.  Global LV systolic function was moderately decreased. Left ventricular   ejection fraction, by visual estimation, is 30 to 35%. The   interventricular septum is flattened in systole and diastole, consistent   with right ventricular pressure and volume overload. Elevated mean left   atrial pressure. Segmental wall motion abnormalities in left circumflex   territory (With Ramus/high anterior OM). PALLIATIVE CONSULT    CC: Patient is a 73y old  Female who presents with a chief complaint of back pain and SOB (12 Feb 2019 13:48)    HPI:   Mrs. Cam is a 73 year old female with metastatic lung cancer (previously on chemo and RT), HTN, CAD, HFrEF (recent echo EF 25-40%) and ES COPD, O2 dependent at home admitted for persistent back pain and found to have NSTEMI. S/P cardiac catheterization on 2/8/19, Acute occlusion of proximal LCX and  of RCA with left to right collaterals; s/p thrombectomy and OSMAR. Post-op monitored in ICU and subsequently downgraded. Course complicated by acute decompensated heart failure and acute respiratory failure requiring BIPAP and diuretics. RRT called this morning for oxygen desaturation on 5L NC, placed on bipap and upgraded to ICU. GOC discussion held by hospitalist this morning with pt and her family given her overall guarded prognosis and pt/family opting for DNR/DNI. Palliative care consulted to provide support and assist with ongoing goc.     Pt seen and examined earlier this afternoon. She was tired, remains on bipap.  at bedside. Daughter/HCP left shortly ago.     Present Symptoms:   Dyspnea: Yes, on bipap  Nausea/Vomiting: No  Anxiety:  Yes   Fatigue: Yes   Pain: No          Limited ROS due to BIPAP    PERTINENT PMH REVIEWED: Yes     PAST MEDICAL & SURGICAL HISTORY:  Carcinoma: metastic stage 4 with stephanie to the iliac bone, colon, and lung  Iliac crest bone pain: cancer, right  Anxiety  Hypothyroid  Hypertension  No significant past surgical history      SOCIAL HISTORY:    Admitted from:  home    Baseline ADLs (prior to admission): some [employer]   Karnofsky:  50-60%    Surrogate/HCP/Guardian: Phone#:  - next of kin is daughter Kiah Fritz [phone]      ADVANCE DIRECTIVES:   DNR YES NO  Completed on:                     MOLST  YES NO   Completed on:  Living Will  YES NO   Completed on:    FAMILY HISTORY:  Unable to obtain at this time due to pt being on bipap       Allergies  No Known Allergies  Intolerances        MEDICATIONS  (STANDING):  ALBUTerol    90 MICROgram(s) HFA Inhaler 1 Puff(s) Inhalation every 4 hours  ALBUTerol/ipratropium for Nebulization 3 milliLiter(s) Nebulizer every 6 hours  aspirin  chewable 81 milliGRAM(s) Oral daily  atorvastatin 40 milliGRAM(s) Oral at bedtime  influenza   Vaccine 0.5 milliLiter(s) IntraMuscular once  levothyroxine 225 MICROGram(s) Oral daily  lidocaine   Patch 1 Patch Transdermal daily  milrinone Infusion 0.375 MICROgram(s)/kG/Min (9.698 mL/Hr) IV Continuous <Continuous>  pantoprazole    Tablet 40 milliGRAM(s) Oral before breakfast  predniSONE   Tablet 5 milliGRAM(s) Oral daily  pregabalin 25 milliGRAM(s) Oral every 8 hours  ticagrelor 90 milliGRAM(s) Oral two times a day  tiotropium 18 MICROgram(s) Capsule 1 Capsule(s) Inhalation daily  vitamin A &amp; D Ointment 1 Application(s) Topical three times a day    MEDICATIONS  (PRN):  ALPRAZolam 0.25 milliGRAM(s) Oral three times a day PRN anxiety  cyclobenzaprine 10 milliGRAM(s) Oral three times a day PRN Muscle Spasm  ondansetron Injectable 4 milliGRAM(s) IV Push every 8 hours PRN Nausea and/or Vomiting  oxyCODONE    5 mG/acetaminophen 325 mG 1 Tablet(s) Oral every 6 hours PRN Severe Pain (7 - 10)  senna 2 Tablet(s) Oral at bedtime PRN Constipation      PHYSICAL EXAM:    Vital Signs Last 24 Hrs  T(C): 36.8 (12 Feb 2019 14:00), Max: 36.9 (11 Feb 2019 16:37)  T(F): 98.2 (12 Feb 2019 14:00), Max: 98.5 (11 Feb 2019 16:37)  HR: 71 (12 Feb 2019 14:46) (64 - 87)  BP: 81/49 (12 Feb 2019 14:30) (81/49 - 101/57)  BP(mean): 61 (12 Feb 2019 14:30) (61 - 74)  RR: 31 (12 Feb 2019 14:30) (17 - 31)  SpO2: 97% (12 Feb 2019 14:46) (85% - 98%)    General: Resting comfortably. No acute distress. drowsy  HEENT: on BIPAP  Lungs: coarse breath sounds. non-labored. secretions  CV: +s1/s2. Regular rate and rhythm.    GI:+ bowel sound. abdomen soft, non-tender  MSK: Moves all 4 extremities.  No cyanosis or edema. +weakness.   Neuro: Awake but drowsy; answers some questions.   Skin: warm and dry    LABS:                        11.0   7.8   )-----------( 265      ( 12 Feb 2019 06:17 )             33.6     02-12    140  |  103  |  56.0<H>  ----------------------------<  101  4.2   |  21.0<L>  |  1.60<H>    Ca    9.4      12 Feb 2019 06:17  Phos  3.1     02-12  Mg     2.3     02-12      I&O's Summary    11 Feb 2019 07:01  -  12 Feb 2019 07:00  --------------------------------------------------------  IN: 480 mL / OUT: 100 mL / NET: 380 mL    12 Feb 2019 07:01  -  12 Feb 2019 15:07  --------------------------------------------------------  IN: 48.5 mL / OUT: 1450 mL / NET: -1401.5 mL    RADIOLOGY & ADDITIONAL STUDIES:     CTA C/A/P 2/8/19  FINDINGS:    There are atherosclerotic changes involving the thoracic aorta. No   evidence of aortic aneurysm or dissection. Atherosclerotic changes   involving the abdominal aorta. No evidence of dissection. 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 Aneurysm is   partially filled with thrombus. No evidence for obstruction of the   external iliac arteries were from her arteries.    The celiac artery, superior mesenteric artery, and inferior mesenteric   artery are patent. There is a single renal artery on each side, patent.    An aberrant right subclavian artery is demonstrated, a congenital   variant. Precontrast images imaging no evidence of intramural hematoma of   the thoracic or upper abdominal aorta.    No evidence of mediastinal or hilar lymphadenopathy. There are small   bilateral pleural effusions, right larger than left. No evidence of a   pericardial effusion. No evidence of axillary adenopathy. No evidence of   pulmonary emboli.    The left lung is clear. There is a sharply circumscribed noncalcified   nodule in the anterior aspect of the right lower lobe within the right   lateral costophrenic angle. This abnormality is unchanged in size and   appearance since 12/19/2015, measuring 1.5 x 1.2 cm compared with 1.6 x   1.3 cm on the prior examination.    Intra-abdominal structures are evaluated during the arterial phase which   is suboptimal.    The liver is unremarkable. Calcifications noted in the wall of the   gallbladder, unchanged from the prior study.    The spleen is not enlarged and demonstrates no focal abnormality. The   pancreatic contour is unremarkable without evidence of mass, inflammation   or ductal dilatation. The adrenal glands demonstrate normal size and   contour.    The right kidney demonstrates a cortical cyst but no evidence of   hydronephrosis. The left kidney is unremarkable.    No evidence of retroperitoneal or pelvic lymphadenopathy.    The bladder, uterus, and adnexal regions demonstrate no abnormality.    Mild colonic diverticulosis. No evidence of diverticulitis. The appendix   is visualized and is normal.    There is a lucency involving the anterior right iliac wing. This   represents the residua of a frankly destructive osseous lesion at this   site on a prior CT of 7/10/2015. There are degenerative changes in the   spine.    IMPRESSION:   No evidence of aortic dissection or aneurysm.  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  Sharply circumscribed noncalcified nodule in the right lower lobe   demonstrating no change in size or appearance since 12/19/2015.  Calcifications noted in the wall of the gallbladder, unchanged.  Lucency in the right iliac wing representing the residua of a destructive   lesion seen at this site on 7/10/2015.     XR LS Spine 2/8/19  IMPRESSION:   Severe degenerative spondylosis without fracture. No   fracture, gross osteolytic or blastic lesion.  If pain persist despite conservative therapy a metastatic lesion or disc   herniation causing central canal or foraminal nerve root compression   clinically suspected further assessment with CT or MRI recommended.       CXR 2/8/19  IMPRESSION: Moderate CHF. Cardiomegaly.      CXR 2/12/19  Findings:     Bilateral diffuse interstitial densities and central   pulmonic congestion is observed. Subsegmental atelectasis left lower lobe   obscuring left diaphragm. Left pleural effusion. Heart size is   unremarkable. The thorax is normal for age.    Impression: Diffuse interstitial densities and congestion unchanged.   Subsegmental atelectasis left lower lobe and increased small left pleural   effusion.    TTE 2/9/19  Left Ventricle: The left ventricular internal cavity size is normal.  Global LV systolic function was moderately decreased. Left ventricular   ejection fraction, by visual estimation, is 30 to 35%. The   interventricular septum is flattened in systole and diastole, consistent   with right ventricular pressure and volume overload. Elevated mean left   atrial pressure. Segmental wall motion abnormalities in left circumflex   territory (With Ramus/high anterior OM).

## 2019-02-12 NOTE — CONSULT NOTE ADULT - ASSESSMENT
73 YOF with NSTEMI s/p PCI to circ lesion, chronic RCA occlusion, severe MR with decompensated acute systolic HF, acute hypoxemic respiratory failure likely due to acute pulmonary edema, requiring inotropic support, EDER
Mrs. Cam is a 73 year old female with metastatic cancer, HTN, CAD, HFrEF, ES COPD on home O2 admitted for back pain and found to have NSTEMI. S/P cardiac cath on 2/8 with occ of prox LCX and  of RCA s/p thrombectomy with stent. Course complicated by acute decompensated heart failure s/p diuretics and for acute respiratory failure requiring bipap. RRT 2/12 for oxygen desaturation while on O2 via 5L NC; placed on bipap and transferred to ICU level of care. GOC discussion initiated by MICU and primary team earlier today, code status addressed and pt/family opted for DNR/DNI, Palliative care consulted for support and ongoing goc.     PLAN:     Acute respiratory failure - s/p RRT today, on bipap  ES COPD  Hx of Radiation Pneumonitis  Acute on Chronic Heart Failure - EF 35-40%  - c/w BIPAP and wean as tolerated, IV milrinone and IV diuretics  - management per MICU      NSTEMI  - s/p cardiac cath 2/8 with thrombectomy and OSMAR  - c/w DAPT  - plan per cardiology    EDER   - etiology unclear ?contrast induced from recent cath  - avoid nephrotoxic agents  - serial Cr/GFR    Palliative Care Encounter  Met with pt and her  to introduce role and scope of palliative. Pt was tired and answered few questions. GOC initiated by primary and MICU team earlier today; code status addressed DNR/DNI with pt and her family. No MOLST was found in chart. A HCP/living will is in the chart; HCP is designated as daughter Kiah Fritz [phone].
73 year old female with advanced stage lung ca, COPD, presents with chest and back pain worsening sob found to be in acute pulmonary edema with TWI and + trop

## 2019-02-12 NOTE — CONSULT NOTE ADULT - PROBLEM SELECTOR RECOMMENDATION 9
NSTEMI, s/p pci with acute back pain overnight, acute pulmonary edema overnight with respiratory distress and hypoxia  NIPPV which was titrated at bedside this am  weaned fi02 to lowest to maintain 02 sat>92%  Milrinone gtt started for inotropic support in low EF  EKG  trend troponin  diuresis  Upgrade to ICU
pt went to cath lab had acute occlusion of circumflex which was stented   continue DAPT  statin   resume pt ARB   TTE

## 2019-02-12 NOTE — PROGRESS NOTE ADULT - ASSESSMENT
74 yo female, PMHX HTN, HLD, hypothyroidism, metastatic Lung CA (received chemo and XRT), former smoker, presented with back pain found to have NSTEMI s/p PCI 1DES to Cx, acute hypoxemic respiratory failure secondary to acute pulmonary edema, acute systolic heart failure.       Acute hypoxemic respiratory failure 2/2 pulmonary edema 2/2 Acute systolic heart failure from ICM  CXR with worsening PVC b/l  ABG with hypoxia  BiPAP with 100% O2, now saturating well  O2 via NC as needed   Lasix 40mg IVP given  empiric nitrates preload reduction bas per cardio  hydralazine for BP ( ACE on hold) if BP tolerates per cardio  Losartan 50mg on hold  Hold procardia and lopressor while on milrinone  Cardiology Dr. Kirk called & at bedside  Echo stat done-LV dilated with inferior posterior akinesia and lateral hypokinesia EF 25-30%. Severe eccentric MR, functional MR no flail. Normal RV function  EKG with new TWI in anterior leads  TnI x 3 ordered  Pt started on milrinone gtt as per cardiology & upgraded to stepdown, ICU consult called        NSTEMI  s/p recent Cx PCI with total RCA  Continue telemetry   Continue Brilinta 90 mg bid  Aspirin 81mg   Atorvastatin 40mg   c/w metoprolol        COPD- stable  ABG with no resp acidosis  On home O2 PRN   Duoneb   Prednisone 5mg daily    Metastatic Lung CA   Continue pain medication      HTN   Continue Nifedipine XL 90mg     Synthroid   Pt compliant with Synthroid 200mcg at home, TSH 12, will increased dose to 225 mcg, check TSH as outpt    Tobacco use   Nicotine patch removed as not smoked since a few months & no signs of withdrawal    Supportive   DVT prophylaxis: SCD  GI prophylaxis: PPI 40mg   Diet: DASH

## 2019-02-12 NOTE — PROGRESS NOTE ADULT - ASSESSMENT
74 yo female with hx of metastatic lung cancer s/p radiation and immunotherapy (Opdivo) complicated by pneumonitis, COPD, CAD, CHFrEF, admitted initially on 2/8 with back pain and dyspnea, found to have NSTEMI and underwent LHC with OSMAR placement in LCA.  She was treated for acute CHF exacerbation/pulmonary edema with bipap and diuretics.    Transferred back to ICU on 2/12 due to worsening respiratory failure again requiring. 72 yo female with hx of metastatic lung cancer s/p radiation and immunotherapy (Opdivo) complicated by pneumonitis, COPD, CAD, CHFrEF, admitted initially on 2/8 with back pain and dyspnea, found to have NSTEMI and underwent LHC with OSMAR placement in LCA.  She was treated for acute CHF exacerbation/pulmonary edema with bipap and diuretics.    Transferred back to ICU on 2/12 due to worsening respiratory failure again requiring bipap, also started on inotrope.

## 2019-02-12 NOTE — CONSULT NOTE ADULT - SUBJECTIVE AND OBJECTIVE BOX
Pt is a 72 YO Pt is a 73 YOF h/o lung cancer with metastatic disease, HTN, HLD, hypothyroid, former smoker who was admitted with back pain.   Pt was found to have NSTEMI and had a cardiac cath with PCI to circ, chronic occlusion of RCA, as well as mod to severe MR.  Pt was initially in ICU and has been downgraded for several days.  This am an RRT was called due to pt in acute hypoxic respiratory distress.  PT was placed on NIPPV by floor provider this morning and was moved to CICU as a 4Bracket/SDU overflow pt.  Pt was seen this am by Dr Kirk/cardio who is concerned that pt is acute decompensated CHF and wants pt started on Milrinone gtt.  Upon my evaluation pt was on NIPPV at 12/6 at 70%, pt was weaned at bedside down to fi02 of 30% and she is maintaining 02 sat 91-92% on these settings getting good volumes. Milrinone gtt was started and pt has been upgraded to ICU for treatment of decompensated CHF/NSTEMI.    Patient is a 73y old  Female who presents with a chief complaint of back pain and SOB (12 Feb 2019 12:25)      BRIEF HOSPITAL COURSE: as above    Events last 24 hours: as above    PAST MEDICAL & SURGICAL HISTORY:  Carcinoma: metastic stage 4 with stephanie to the iliac bone, colon, and lung  Iliac crest bone pain: cancer, right  Anxiety  Hypothyroid  Hypertension  No significant past surgical history      Review of Systems:  CONSTITUTIONAL: No fever, chills, or fatigue  EYES: No eye pain, visual disturbances, or discharge  ENMT:  No difficulty hearing, tinnitus, vertigo; No sinus or throat pain  NECK: No pain or stiffness  RESPIRATORY: mild shortness of breath  CARDIOVASCULAR: c/o back pain  GASTROINTESTINAL: No abdominal or epigastric pain. No nausea, vomiting, or hematemesis; No diarrhea or constipation. No melena or hematochezia.  GENITOURINARY: No dysuria, frequency, hematuria, or incontinence  NEUROLOGICAL: No headaches, memory loss, loss of strength, numbness, or tremors  SKIN: No itching, burning, rashes, or lesions   MUSCULOSKELETAL: No joint pain or swelling; No muscle, back, or extremity pain  PSYCHIATRIC: No depression, anxiety, mood swings, or difficulty sleeping      Medications:    milrinone Infusion 0.375 MICROgram(s)/kG/Min IV Continuous <Continuous>    ALBUTerol    90 MICROgram(s) HFA Inhaler 1 Puff(s) Inhalation every 4 hours  ALBUTerol/ipratropium for Nebulization 3 milliLiter(s) Nebulizer every 6 hours  tiotropium 18 MICROgram(s) Capsule 1 Capsule(s) Inhalation daily    ALPRAZolam 0.25 milliGRAM(s) Oral three times a day PRN  cyclobenzaprine 10 milliGRAM(s) Oral three times a day PRN  ondansetron Injectable 4 milliGRAM(s) IV Push every 8 hours PRN  oxyCODONE    5 mG/acetaminophen 325 mG 1 Tablet(s) Oral every 6 hours PRN  pregabalin 25 milliGRAM(s) Oral every 8 hours      aspirin  chewable 81 milliGRAM(s) Oral daily  ticagrelor 90 milliGRAM(s) Oral two times a day    pantoprazole    Tablet 40 milliGRAM(s) Oral before breakfast  senna 2 Tablet(s) Oral at bedtime PRN      atorvastatin 40 milliGRAM(s) Oral at bedtime  levothyroxine 225 MICROGram(s) Oral daily  predniSONE   Tablet 5 milliGRAM(s) Oral daily      influenza   Vaccine 0.5 milliLiter(s) IntraMuscular once    lidocaine   Patch 1 Patch Transdermal daily  vitamin A &amp; D Ointment 1 Application(s) Topical three times a day            ICU Vital Signs Last 24 Hrs  T(C): 36.9 (12 Feb 2019 10:00), Max: 36.9 (11 Feb 2019 16:37)  T(F): 98.4 (12 Feb 2019 10:00), Max: 98.5 (11 Feb 2019 16:37)  HR: 66 (12 Feb 2019 12:33) (64 - 87)  BP: 90/54 (12 Feb 2019 12:30) (89/51 - 101/57)  BP(mean): 68 (12 Feb 2019 12:30) (66 - 74)  ABP: --  ABP(mean): --  RR: 21 (12 Feb 2019 12:30) (17 - 21)  SpO2: 98% (12 Feb 2019 12:33) (90% - 98%)      ABG - ( 12 Feb 2019 08:11 )  pH, Arterial: 7.42  pH, Blood: x     /  pCO2: 35    /  pO2: 69    / HCO3: 23    / Base Excess: -1.8  /  SaO2: 96                        LABS:                        11.0   7.8   )-----------( 265      ( 12 Feb 2019 06:17 )             33.6     02-12    140  |  103  |  56.0<H>  ----------------------------<  101  4.2   |  21.0<L>  |  1.60<H>    Ca    9.4      12 Feb 2019 06:17  Phos  3.1     02-12  Mg     2.3     02-12        CARDIAC MARKERS ( 12 Feb 2019 12:16 )  x     / 11.93 ng/mL / x     / x     / x          CAPILLARY BLOOD GLUCOSE      POCT Blood Glucose.: 136 mg/dL (12 Feb 2019 08:04)        CULTURES:      Physical Examination:    General: Awake, alert, on NIPPV tolerating well    HEENT: Pupils equal, reactive to light.  Symmetric.    PULM: bibasilar crackles bilaterally, no significant sputum production    CVS: Regular rate and rhythm, no murmurs, rubs, or gallops    ABD: Soft, nondistended, nontender, normoactive bowel sounds, no masses    EXT: mild b/l LE edema, nontender    SKIN: Warm and well perfused, no rashes noted.    RADIOLOGY: images reviewed    CRITICAL CARE TIME SPENT: 60 min

## 2019-02-12 NOTE — CONSULT NOTE ADULT - PROBLEM SELECTOR RECOMMENDATION 4
per last CT scan in Oct, pt has progression of disease and has had radiation therapy in the last 90days  PMD Kaushik [phone]
s/p pci  continue asa/brilinta   statin therapy

## 2019-02-12 NOTE — CONSULT NOTE ADULT - PROBLEM SELECTOR RECOMMENDATION 5
disparity of dosing reported on paper from spouse and typed paper from physicians office will attempt to get info from pharmacy -pt goes to [facility] in [place] to fill Rx- currently closed will call in AM  check thyroid function tests
likely due to combination of HF as well as contrast nephropathy from cardiac cath  continue to monitor cr  avoid nephrotoxins as much as possible

## 2019-02-12 NOTE — CONSULT NOTE ADULT - ATTENDING COMMENTS
D/W hospitalist Dr. Menezes, RN, patient and her     Thank you for the opportunity to assist with the care of this patient.   [facility] [phone].
Case d/w Dr. Avila

## 2019-02-12 NOTE — CHART NOTE - NSCHARTNOTEFT_GEN_A_CORE
Rapid Response PGY 1/ PGY 3 Note  Patient is a 73y old  Female PMHx lung cancer, HTN, CAD (w/ recent stent on admission), CHF and ES-COPD.  Rapid response team called because of desaturation to 84% on 5L O2.   Upon arrival cardiac NP, respiratory therapist and nurses at bedside. Pt was already on Bipap and Duoneb tx w/ 97% saturation. Pt received 4mg Dilaudid and 20mg lasix PO this morning.      Patient was seen and examined at the bedside by the rapid response team. Pt said her breathing felt tight. Denied  CP or palpitations.     Allergies    No Known Allergies    Intolerances        PAST MEDICAL & SURGICAL HISTORY:  Carcinoma: metastic stage 4 with stephanie to the iliac bone, colon, and lung  Iliac crest bone pain: cancer, right  Anxiety  Hypothyroid  Hypertension  No significant past surgical history      Vital Signs Last 24 Hrs  T(C): 36.9 (12 Feb 2019 06:12), Max: 36.9 (11 Feb 2019 16:37)  T(F): 98.5 (12 Feb 2019 06:12), Max: 98.5 (11 Feb 2019 16:37)  HR: 82 (12 Feb 2019 06:12) (68 - 82)  BP: 92/50 (12 Feb 2019 07:52) (90/54 - 100/60)  BP(mean): --  RR: 20 (12 Feb 2019 06:12) (19 - 20)  SpO2: 90% (12 Feb 2019 06:12) (90% - 96%)    General: AAO X3, mild distress w/ labored breathing on Bipap   HEENT: NC/AT, EOMI  Resp: CTA b/l, b/l wheezing, no crackles, abdominal retractions notec  CVS: Normal S1/S2, no murmur, gallops or rubs   GI: +BS, obese, soft, NT, ND  Extremities: +2 DP pulses b/l, no edema, cyanosis or calf tenderness   Neuro: AAO X3, no focal deficits                          11.0   7.8   )-----------( 265      ( 12 Feb 2019 06:17 )             33.6     02-12    140  |  103  |  56.0<H>  ----------------------------<  101  4.2   |  21.0<L>  |  1.60<H>    Ca    9.4      12 Feb 2019 06:17  Phos  3.1     02-12  Mg     2.3     02-12        Assessment and Plan- Rapid Response called for 73y year old Female with a past medical history of lung cancer, HTN, CAD (w/ recent stent on admission), CHF and ES-COPD.     Plan-  -Dr. Alvarado at bedside. CXR showing increased pulmonary congestion and blunting of costophrenic angles when compared to CXR done yesterday. Pt was given Lasix 40mg IV.   -ABG done, hypoxemia noted no hypercapnia   -EKG showed ST depression changes on 1mm in V2-3 compared to prior. Cardiac enzymes pending.  -Rest of management per primary team.  -debriefed at bedside with care team. Rapid Response PGY 1/ PGY 3 Note  Patient is a 73y old  Female PMHx lung cancer, HTN, CAD (w/ recent stent on admission), CHF and ES-COPD.  Rapid response team called because of desaturation to 84% on 5L O2.   Upon arrival cardiac NP, respiratory therapist and nurses at bedside. Pt was already on Bipap and Duoneb tx w/ 97% saturation. Pt received 4mg Dilaudid and 20mg lasix PO this morning.      Patient was seen and examined at the bedside by the rapid response team. Pt said her breathing felt tight. Denied  CP or palpitations.     Allergies    No Known Allergies    Intolerances        PAST MEDICAL & SURGICAL HISTORY:  Carcinoma: metastic stage 4 with stephanie to the iliac bone, colon, and lung  Iliac crest bone pain: cancer, right  Anxiety  Hypothyroid  Hypertension  No significant past surgical history      Vital Signs Last 24 Hrs  T(C): 36.9 (12 Feb 2019 06:12), Max: 36.9 (11 Feb 2019 16:37)  T(F): 98.5 (12 Feb 2019 06:12), Max: 98.5 (11 Feb 2019 16:37)  HR: 82 (12 Feb 2019 06:12) (68 - 82)  BP: 92/50 (12 Feb 2019 07:52) (90/54 - 100/60)  BP(mean): --  RR: 20 (12 Feb 2019 06:12) (19 - 20)  SpO2: 90% (12 Feb 2019 06:12) (90% - 96%)    General: AAO X3, mild distress w/ labored breathing on Bipap   HEENT: NC/AT, EOMI  Resp: CTA b/l, b/l wheezing, no crackles, abdominal retractions notec  CVS: Normal S1/S2, no murmur, gallops or rubs   GI: +BS, obese, soft, NT, ND  Extremities: +2 DP pulses b/l, no edema, cyanosis or calf tenderness   Neuro: AAO X3, no focal deficits                          11.0   7.8   )-----------( 265      ( 12 Feb 2019 06:17 )             33.6     02-12    140  |  103  |  56.0<H>  ----------------------------<  101  4.2   |  21.0<L>  |  1.60<H>    Ca    9.4      12 Feb 2019 06:17  Phos  3.1     02-12  Mg     2.3     02-12        Assessment and Plan- Rapid Response called for 73y year old Female with a past medical history of lung cancer, HTN, CAD (w/ recent stent on admission), CHF and ES-COPD.     Plan-  -Dr. Alvarado at bedside. CXR showing increased pulmonary congestion and blunting of costophrenic angles when compared to CXR done yesterday. Pt was given Lasix 40mg IV.   -ABG done, hypoxemia noted no hypercapnia   -EKG showed ST depression changes on 1mm in V2-3 compared to prior. Cardiac enzymes pending.  -dilaudid discontinued, changed to percocet for severe pain  -transfer in level of care to stepdown unit  -PO lasix was discontinued   -Rest of management per primary team.  -debriefed at bedside with care team. Rapid Response PGY 1/ PGY 3 Note  Patient is a 73y old  Female PMHx lung cancer, HTN, CAD (w/ recent stent on admission), CHF and ES-COPD.  Rapid response team called because of desaturation to 84% on 5L O2.   Upon arrival cardiac NP, respiratory therapist and nurses at bedside. Pt was already on Bipap and Duoneb tx w/ 97% saturation. Pt received 4mg Dilaudid and 20mg lasix PO this morning.      Patient was seen and examined at the bedside by the rapid response team. Pt said her breathing felt tight. Denied  CP or palpitations.     Allergies    No Known Allergies    Intolerances        PAST MEDICAL & SURGICAL HISTORY:  Carcinoma: metastic stage 4 with stephanie to the iliac bone, colon, and lung  Iliac crest bone pain: cancer, right  Anxiety  Hypothyroid  Hypertension  No significant past surgical history      Vital Signs Last 24 Hrs  T(C): 36.9 (12 Feb 2019 06:12), Max: 36.9 (11 Feb 2019 16:37)  T(F): 98.5 (12 Feb 2019 06:12), Max: 98.5 (11 Feb 2019 16:37)  HR: 82 (12 Feb 2019 06:12) (68 - 82)  BP: 92/50 (12 Feb 2019 07:52) (90/54 - 100/60)  BP(mean): --  RR: 20 (12 Feb 2019 06:12) (19 - 20)  SpO2: 90% (12 Feb 2019 06:12) (90% - 96%)    General: AAO X3, mild distress w/ labored breathing on Bipap   HEENT: NC/AT, EOMI  Resp: CTA b/l, b/l wheezing, no crackles, abdominal retractions notec  CVS: Normal S1/S2, no murmur, gallops or rubs   GI: +BS, obese, soft, NT, ND  Extremities: +2 DP pulses b/l, no edema, cyanosis or calf tenderness   Neuro: AAO X3, no focal deficits                          11.0   7.8   )-----------( 265      ( 12 Feb 2019 06:17 )             33.6     02-12    140  |  103  |  56.0<H>  ----------------------------<  101  4.2   |  21.0<L>  |  1.60<H>    Ca    9.4      12 Feb 2019 06:17  Phos  3.1     02-12  Mg     2.3     02-12        Assessment and Plan- Rapid Response called for 73y year old Female with a past medical history of lung cancer, HTN, CAD (w/ recent stent on admission), CHF and ES-COPD.     Plan-  -Dr. Alvarado at bedside. CXR showing increased pulmonary congestion and blunting of costophrenic angles when compared to CXR done yesterday. Pt was given Lasix 40mg IV.   -ABG done, hypoxemia noted no hypercapnia   -EKG showed ST depression changes on 1mm in V2-3 compared to prior. Cardiac enzymes pending.  -dilaudid discontinued, changed to percocet for severe pain  -transfer in level of care to stepdown unit  -PO lasix was discontinued   -Rest of management per primary team.  -debriefed at bedside with care team.  ----  Addendum  -Pt currently in CICU, due to patient transfer at 9:05am laboratory did not sent phlebotomist to draw stat troponin ordered at 8:30am, once pt arrived to CICU bedside nurse was unable to obtain blood sample, currently still attempting blood draw for troponins.  D/w Dr Menezes.

## 2019-02-12 NOTE — CONSULT NOTE ADULT - PROBLEM SELECTOR RECOMMENDATION 3
wean off NIV as able to nasal oxygen  pt prescribed 2-3LPM NV O2 at home but does not wear it often
as above

## 2019-02-12 NOTE — PROGRESS NOTE ADULT - SUBJECTIVE AND OBJECTIVE BOX
INTERVAL HPI/OVERNIGHT EVENTS: Placed on bipap this AM due to respiratory distress.  Started on milrinone.     On Admission  02-08-19 (4d)  HPI:  73 yr old patient with known lung cancer and Htn and CAD and CHF and ES-COPD now with persistent back pain - found to have EKG changes of acute ischemia in Ed - going for cath lab - on BIPAP, no fever , no phlegm, no (08 Feb 2019 17:35)    PAST MEDICAL & SURGICAL HISTORY:  Carcinoma: metastic stage 4 with stephanie to the iliac bone, colon, and lung  Iliac crest bone pain: cancer, right  Anxiety  Hypothyroid  Hypertension  No significant past surgical history      Antimicrobial:    Cardiovascular:  milrinone Infusion 0.375 MICROgram(s)/kG/Min IV Continuous <Continuous>    Pulmonary:  ALBUTerol    90 MICROgram(s) HFA Inhaler 1 Puff(s) Inhalation every 4 hours  ALBUTerol/ipratropium for Nebulization 3 milliLiter(s) Nebulizer every 6 hours  tiotropium 18 MICROgram(s) Capsule 1 Capsule(s) Inhalation daily    Hematalogic:  aspirin  chewable 81 milliGRAM(s) Oral daily  ticagrelor 90 milliGRAM(s) Oral two times a day    Other:  ALPRAZolam 0.25 milliGRAM(s) Oral three times a day PRN  atorvastatin 40 milliGRAM(s) Oral at bedtime  cyclobenzaprine 10 milliGRAM(s) Oral three times a day PRN  influenza   Vaccine 0.5 milliLiter(s) IntraMuscular once  levothyroxine 225 MICROGram(s) Oral daily  lidocaine   Patch 1 Patch Transdermal daily  ondansetron Injectable 4 milliGRAM(s) IV Push every 8 hours PRN  oxyCODONE    5 mG/acetaminophen 325 mG 1 Tablet(s) Oral every 6 hours PRN  pantoprazole    Tablet 40 milliGRAM(s) Oral before breakfast  predniSONE   Tablet 5 milliGRAM(s) Oral daily  pregabalin 25 milliGRAM(s) Oral every 8 hours  senna 2 Tablet(s) Oral at bedtime PRN  vitamin A &amp; D Ointment 1 Application(s) Topical three times a day      Drug Dosing Weight  Height (cm): 170.18 (08 Feb 2019 09:33)  Weight (kg): 86.2 (08 Feb 2019 09:33)  BMI (kg/m2): 29.8 (08 Feb 2019 09:33)  BSA (m2): 1.98 (08 Feb 2019 09:33)    T(C): 36.9 (02-12-19 @ 10:00), Max: 36.9 (02-11-19 @ 16:37)  HR: 77 (02-12-19 @ 13:00)  BP: 91/54 (02-12-19 @ 13:00)  BP(mean): 67 (02-12-19 @ 13:00)  ABP: --  ABP(mean): --  RR: 21 (02-12-19 @ 13:00)  SpO2: 93% (02-12-19 @ 13:00)    ABG - ( 12 Feb 2019 08:11 )  pH, Arterial: 7.42  pH, Blood: x     /  pCO2: 35    /  pO2: 69    / HCO3: 23    / Base Excess: -1.8  /  SaO2: 96                    02-11 @ 07:01  -  02-12 @ 07:00  --------------------------------------------------------  IN: 480 mL / OUT: 100 mL / NET: 380 mL            PHYSICAL EXAM:    GENERAL: NAD, on bipap  HEAD:  Atraumatic, normocephalic  EYES: EOMI, PERRL, sclera and conjunctiva clear  ENMT:  MMM, No oropharyngeal erythema or exudates  NECK:  Supple, normal appearance, trachea midline, no JVD noted  NERVOUS SYSTEM:  Alert & Oriented X3, Symmetric strength in all extremities    CHEST/LUNG: Bilateral air entry, crackles at bases  HEART: Regular rate, regular rhythm;   ABDOMEN: Soft, Nontender, Nondistended;  EXTREMITIES:  bilat LE edema, no clubbing   LYMPH:  No lymphadenopathy noted  SKIN:  No visible rashes or skin lesions, good capillary refill  PSYCH: appropriate affect and behavior    LABS:  CBC Full  -  ( 12 Feb 2019 06:17 )  WBC Count : 7.8 K/uL  Hemoglobin : 11.0 g/dL  Hematocrit : 33.6 %  Platelet Count - Automated : 265 K/uL  Mean Cell Volume : 90.1 fl  Mean Cell Hemoglobin : 29.5 pg  Mean Cell Hemoglobin Concentration : 32.7 g/dL  Auto Neutrophil # : 5.8 K/uL  Auto Lymphocyte # : 0.7 K/uL  Auto Monocyte # : 1.1 K/uL  Auto Eosinophil # : 0.1 K/uL  Auto Basophil # : 0.0 K/uL  Auto Neutrophil % : 75.1 %  Auto Lymphocyte % : 9.1 %  Auto Monocyte % : 13.8 %  Auto Eosinophil % : 1.4 %  Auto Basophil % : 0.3 %    02-12    140  |  103  |  56.0<H>  ----------------------------<  101  4.2   |  21.0<L>  |  1.60<H>    Ca    9.4      12 Feb 2019 06:17  Phos  3.1     02-12  Mg     2.3     02-12              RADIOLOGY personally reviewed    GOALS OF CARE DISCUSSION WITH PATIENT/FAMILY/PROXY: family at bedside, updated on statu    CRITICAL CARE TIME SPENT:  35 min spent titrating noninvasive vent settings and vasoactive meds including inotropes and vasodilators.

## 2019-02-13 LAB
ANION GAP SERPL CALC-SCNC: 15 MMOL/L — SIGNIFICANT CHANGE UP (ref 5–17)
ANION GAP SERPL CALC-SCNC: 17 MMOL/L — SIGNIFICANT CHANGE UP (ref 5–17)
APTT BLD: 48.8 SEC — HIGH (ref 27.5–36.3)
BASOPHILS # BLD AUTO: 0 K/UL — SIGNIFICANT CHANGE UP (ref 0–0.2)
BASOPHILS NFR BLD AUTO: 0.4 % — SIGNIFICANT CHANGE UP (ref 0–2)
BUN SERPL-MCNC: 50 MG/DL — HIGH (ref 8–20)
BUN SERPL-MCNC: 53 MG/DL — HIGH (ref 8–20)
CALCIUM SERPL-MCNC: 9.4 MG/DL — SIGNIFICANT CHANGE UP (ref 8.6–10.2)
CALCIUM SERPL-MCNC: 9.8 MG/DL — SIGNIFICANT CHANGE UP (ref 8.6–10.2)
CHLORIDE SERPL-SCNC: 100 MMOL/L — SIGNIFICANT CHANGE UP (ref 98–107)
CHLORIDE SERPL-SCNC: 104 MMOL/L — SIGNIFICANT CHANGE UP (ref 98–107)
CO2 SERPL-SCNC: 21 MMOL/L — LOW (ref 22–29)
CO2 SERPL-SCNC: 22 MMOL/L — SIGNIFICANT CHANGE UP (ref 22–29)
CREAT SERPL-MCNC: 1.43 MG/DL — HIGH (ref 0.5–1.3)
CREAT SERPL-MCNC: 1.68 MG/DL — HIGH (ref 0.5–1.3)
EOSINOPHIL # BLD AUTO: 0.2 K/UL — SIGNIFICANT CHANGE UP (ref 0–0.5)
EOSINOPHIL NFR BLD AUTO: 3.4 % — SIGNIFICANT CHANGE UP (ref 0–6)
GLUCOSE SERPL-MCNC: 106 MG/DL — SIGNIFICANT CHANGE UP (ref 70–115)
GLUCOSE SERPL-MCNC: 109 MG/DL — SIGNIFICANT CHANGE UP (ref 70–115)
HCT VFR BLD CALC: 31.7 % — LOW (ref 37–47)
HGB BLD-MCNC: 10.5 G/DL — LOW (ref 12–16)
LYMPHOCYTES # BLD AUTO: 0.8 K/UL — LOW (ref 1–4.8)
LYMPHOCYTES # BLD AUTO: 12 % — LOW (ref 20–55)
MAGNESIUM SERPL-MCNC: 2.3 MG/DL — SIGNIFICANT CHANGE UP (ref 1.6–2.6)
MAGNESIUM SERPL-MCNC: 2.5 MG/DL — SIGNIFICANT CHANGE UP (ref 1.6–2.6)
MCHC RBC-ENTMCNC: 29.2 PG — SIGNIFICANT CHANGE UP (ref 27–31)
MCHC RBC-ENTMCNC: 33.1 G/DL — SIGNIFICANT CHANGE UP (ref 32–36)
MCV RBC AUTO: 88.1 FL — SIGNIFICANT CHANGE UP (ref 81–99)
MONOCYTES # BLD AUTO: 0.8 K/UL — SIGNIFICANT CHANGE UP (ref 0–0.8)
MONOCYTES NFR BLD AUTO: 12.1 % — HIGH (ref 3–10)
NEUTROPHILS # BLD AUTO: 5 K/UL — SIGNIFICANT CHANGE UP (ref 1.8–8)
NEUTROPHILS NFR BLD AUTO: 71.7 % — SIGNIFICANT CHANGE UP (ref 37–73)
PHOSPHATE SERPL-MCNC: 3.3 MG/DL — SIGNIFICANT CHANGE UP (ref 2.4–4.7)
PHOSPHATE SERPL-MCNC: 3.7 MG/DL — SIGNIFICANT CHANGE UP (ref 2.4–4.7)
PLATELET # BLD AUTO: 269 K/UL — SIGNIFICANT CHANGE UP (ref 150–400)
POTASSIUM SERPL-MCNC: 4 MMOL/L — SIGNIFICANT CHANGE UP (ref 3.5–5.3)
POTASSIUM SERPL-MCNC: 4.3 MMOL/L — SIGNIFICANT CHANGE UP (ref 3.5–5.3)
POTASSIUM SERPL-SCNC: 4 MMOL/L — SIGNIFICANT CHANGE UP (ref 3.5–5.3)
POTASSIUM SERPL-SCNC: 4.3 MMOL/L — SIGNIFICANT CHANGE UP (ref 3.5–5.3)
RBC # BLD: 3.6 M/UL — LOW (ref 4.4–5.2)
RBC # FLD: 15.1 % — SIGNIFICANT CHANGE UP (ref 11–15.6)
SODIUM SERPL-SCNC: 138 MMOL/L — SIGNIFICANT CHANGE UP (ref 135–145)
SODIUM SERPL-SCNC: 141 MMOL/L — SIGNIFICANT CHANGE UP (ref 135–145)
WBC # BLD: 7 K/UL — SIGNIFICANT CHANGE UP (ref 4.8–10.8)
WBC # FLD AUTO: 7 K/UL — SIGNIFICANT CHANGE UP (ref 4.8–10.8)

## 2019-02-13 PROCEDURE — 99291 CRITICAL CARE FIRST HOUR: CPT

## 2019-02-13 PROCEDURE — 99232 SBSQ HOSP IP/OBS MODERATE 35: CPT

## 2019-02-13 RX ORDER — FUROSEMIDE 40 MG
40 TABLET ORAL
Qty: 0 | Refills: 0 | Status: DISCONTINUED | OUTPATIENT
Start: 2019-02-13 | End: 2019-02-14

## 2019-02-13 RX ORDER — FUROSEMIDE 40 MG
40 TABLET ORAL ONCE
Qty: 0 | Refills: 0 | Status: COMPLETED | OUTPATIENT
Start: 2019-02-13 | End: 2019-02-13

## 2019-02-13 RX ORDER — DOCUSATE SODIUM 100 MG
100 CAPSULE ORAL
Qty: 0 | Refills: 0 | Status: DISCONTINUED | OUTPATIENT
Start: 2019-02-13 | End: 2019-02-17

## 2019-02-13 RX ORDER — FUROSEMIDE 40 MG
20 TABLET ORAL
Qty: 0 | Refills: 0 | Status: DISCONTINUED | OUTPATIENT
Start: 2019-02-13 | End: 2019-02-13

## 2019-02-13 RX ORDER — FUROSEMIDE 40 MG
25 TABLET ORAL
Qty: 0 | Refills: 0 | COMMUNITY

## 2019-02-13 RX ORDER — HEPARIN SODIUM 5000 [USP'U]/ML
5000 INJECTION INTRAVENOUS; SUBCUTANEOUS EVERY 8 HOURS
Qty: 0 | Refills: 0 | Status: DISCONTINUED | OUTPATIENT
Start: 2019-02-13 | End: 2019-02-17

## 2019-02-13 RX ORDER — LEVOTHYROXINE SODIUM 125 MCG
1 TABLET ORAL
Qty: 0 | Refills: 0 | COMMUNITY

## 2019-02-13 RX ORDER — MILRINONE LACTATE 1 MG/ML
0.2 INJECTION, SOLUTION INTRAVENOUS
Qty: 20 | Refills: 0 | Status: DISCONTINUED | OUTPATIENT
Start: 2019-02-13 | End: 2019-02-15

## 2019-02-13 RX ADMIN — MILRINONE LACTATE 12.93 MICROGRAM(S)/KG/MIN: 1 INJECTION, SOLUTION INTRAVENOUS at 13:53

## 2019-02-13 RX ADMIN — Medication 5 MILLIGRAM(S): at 05:30

## 2019-02-13 RX ADMIN — Medication 25 MILLIGRAM(S): at 13:59

## 2019-02-13 RX ADMIN — PANTOPRAZOLE SODIUM 40 MILLIGRAM(S): 20 TABLET, DELAYED RELEASE ORAL at 05:30

## 2019-02-13 RX ADMIN — Medication 40 MILLIGRAM(S): at 18:26

## 2019-02-13 RX ADMIN — MILRINONE LACTATE 9.7 MICROGRAM(S)/KG/MIN: 1 INJECTION, SOLUTION INTRAVENOUS at 05:29

## 2019-02-13 RX ADMIN — Medication 3 MILLILITER(S): at 08:55

## 2019-02-13 RX ADMIN — MILRINONE LACTATE 12.93 MICROGRAM(S)/KG/MIN: 1 INJECTION, SOLUTION INTRAVENOUS at 21:22

## 2019-02-13 RX ADMIN — Medication 1 APPLICATION(S): at 05:31

## 2019-02-13 RX ADMIN — Medication 600 MILLIGRAM(S): at 21:23

## 2019-02-13 RX ADMIN — Medication 25 MILLIGRAM(S): at 05:30

## 2019-02-13 RX ADMIN — Medication 1 APPLICATION(S): at 13:56

## 2019-02-13 RX ADMIN — Medication 100 MILLIGRAM(S): at 18:26

## 2019-02-13 RX ADMIN — TICAGRELOR 90 MILLIGRAM(S): 90 TABLET ORAL at 18:26

## 2019-02-13 RX ADMIN — Medication 40 MILLIGRAM(S): at 06:09

## 2019-02-13 RX ADMIN — Medication 600 MILLIGRAM(S): at 10:43

## 2019-02-13 RX ADMIN — HEPARIN SODIUM 1200 UNIT(S)/HR: 5000 INJECTION INTRAVENOUS; SUBCUTANEOUS at 06:11

## 2019-02-13 RX ADMIN — ATORVASTATIN CALCIUM 40 MILLIGRAM(S): 80 TABLET, FILM COATED ORAL at 21:24

## 2019-02-13 RX ADMIN — Medication 40 MILLIGRAM(S): at 13:54

## 2019-02-13 RX ADMIN — LIDOCAINE 1 PATCH: 4 CREAM TOPICAL at 23:00

## 2019-02-13 RX ADMIN — Medication 10 MILLIGRAM(S): at 11:23

## 2019-02-13 RX ADMIN — Medication 3 MILLILITER(S): at 14:48

## 2019-02-13 RX ADMIN — TICAGRELOR 90 MILLIGRAM(S): 90 TABLET ORAL at 05:30

## 2019-02-13 RX ADMIN — Medication 3 MILLILITER(S): at 20:46

## 2019-02-13 RX ADMIN — HEPARIN SODIUM 5000 UNIT(S): 5000 INJECTION INTRAVENOUS; SUBCUTANEOUS at 21:24

## 2019-02-13 RX ADMIN — Medication 81 MILLIGRAM(S): at 11:23

## 2019-02-13 RX ADMIN — LIDOCAINE 1 PATCH: 4 CREAM TOPICAL at 20:36

## 2019-02-13 RX ADMIN — MILRINONE LACTATE 12.93 MICROGRAM(S)/KG/MIN: 1 INJECTION, SOLUTION INTRAVENOUS at 08:55

## 2019-02-13 RX ADMIN — OXYCODONE AND ACETAMINOPHEN 1 TABLET(S): 5; 325 TABLET ORAL at 17:45

## 2019-02-13 RX ADMIN — Medication 25 MILLIGRAM(S): at 21:25

## 2019-02-13 RX ADMIN — LIDOCAINE 1 PATCH: 4 CREAM TOPICAL at 11:22

## 2019-02-13 RX ADMIN — OXYCODONE AND ACETAMINOPHEN 1 TABLET(S): 5; 325 TABLET ORAL at 16:38

## 2019-02-13 RX ADMIN — HEPARIN SODIUM 5000 UNIT(S): 5000 INJECTION INTRAVENOUS; SUBCUTANEOUS at 13:53

## 2019-02-13 RX ADMIN — CYCLOBENZAPRINE HYDROCHLORIDE 10 MILLIGRAM(S): 10 TABLET, FILM COATED ORAL at 19:43

## 2019-02-13 RX ADMIN — Medication 3 MILLILITER(S): at 02:41

## 2019-02-13 RX ADMIN — Medication 225 MICROGRAM(S): at 05:29

## 2019-02-13 RX ADMIN — Medication 1 APPLICATION(S): at 21:23

## 2019-02-13 NOTE — CHART NOTE - NSCHARTNOTEFT_GEN_A_CORE
Upon Nutritional Assessment by the Registered Dietitian your patient was determined to meet criteria / has evidence of the following diagnosis/diagnoses:          [ ]  Mild Protein Calorie Malnutrition        [x ]  Moderate Protein Calorie Malnutrition        [ ] Severe Protein Calorie Malnutrition        [ ] Unspecified Protein Calorie Malnutrition        [ ] Underweight / BMI <19        [ ] Morbid Obesity / BMI > 40      Findings as based on:  •  Comprehensive nutrition assessment and consultation  •  Calorie counts (nutrient intake analysis)  •  Food acceptance and intake status from observations by staff  •  Follow up  •  Patient education  •  Intervention secondary to interdisciplinary rounds  •   concerns      Treatment:    The following diet has been recommended:    Ensure TID     PROVIDER Section:     By signing this assessment you are acknowledging and agree with the diagnosis/diagnoses assigned by the Registered Dietitian    Comments:

## 2019-02-13 NOTE — DIETITIAN INITIAL EVALUATION ADULT. - OTHER INFO
Pt admitted with SOB and Back pain, hx of end stage COPD, metastatic lung cancer to bone and colon. Pt s/p rapid response secondary to Respiratory distress, currently on High flow. Pt reports eating well PTA, however currently has no appetite and decreased PO intake, 25% taken at breakfast this morning secondary to difficulty breathing. Pt denies wt loss, however B/L leg edema noted. Food preferences obtained.

## 2019-02-13 NOTE — PROGRESS NOTE ADULT - SUBJECTIVE AND OBJECTIVE BOX
Patient is a 73y old  Female who presents with a chief complaint of back pain and SOB (13 Feb 2019 13:14)      BRIEF HOSPITAL COURSE: 73y Female PMHX HTN, HLD, hypothyroidism, metastatic Lung CA (received chemo and XRT), former smoker, who presented to [facility] ED with upperback pain.  Pt has had progressively worsening back pain for the past few days.  Her pain was b/l shoulders intermittently occurring  She went to the chiropractor today and had worsening of her symptoms.  In the ED she has new ST depression in the anterior/lateral leads with trop of 2.0.  She was in respiratory distress requiring bipap and lasix.  She does mention worsening sob/LE edema for the past few days. Pt taken to cath lab 1 OSMAR placed to circumflex and was monitored in ICU briefly post cath, returned to ICU for worsening SOB due to decompensated heart failure requiring inotropic support.       Events last 24 hours: remains on milrinone, transitioned from BIPAP to high flow    PAST MEDICAL & SURGICAL HISTORY:  Carcinoma: metastatic stage 4 with mets to the iliac bone, colon, and lung  Iliac crest bone pain: cancer, right  Anxiety  Hypothyroid  Hypertension  No significant past surgical history        Medications:    furosemide   Injectable 40 milliGRAM(s) IV Push once  furosemide   Injectable 40 milliGRAM(s) IV Push two times a day  milrinone Infusion 0.5 MICROgram(s)/kG/Min IV Continuous <Continuous>    ALBUTerol    90 MICROgram(s) HFA Inhaler 1 Puff(s) Inhalation every 4 hours  ALBUTerol/ipratropium for Nebulization 3 milliLiter(s) Nebulizer every 6 hours  guaiFENesin  milliGRAM(s) Oral every 12 hours  tiotropium 18 MICROgram(s) Capsule 1 Capsule(s) Inhalation daily    ALPRAZolam 0.25 milliGRAM(s) Oral three times a day PRN  cyclobenzaprine 10 milliGRAM(s) Oral three times a day PRN  morphine  - Injectable 2 milliGRAM(s) IV Push once  ondansetron Injectable 4 milliGRAM(s) IV Push every 8 hours PRN  oxyCODONE    5 mG/acetaminophen 325 mG 1 Tablet(s) Oral every 6 hours PRN  pregabalin 25 milliGRAM(s) Oral every 8 hours      aspirin  chewable 81 milliGRAM(s) Oral daily  heparin  Injectable 5000 Unit(s) SubCutaneous every 8 hours  ticagrelor 90 milliGRAM(s) Oral two times a day    bisacodyl Suppository 10 milliGRAM(s) Rectal daily PRN  docusate sodium 100 milliGRAM(s) Oral two times a day  pantoprazole    Tablet 40 milliGRAM(s) Oral before breakfast  senna 2 Tablet(s) Oral at bedtime PRN      atorvastatin 40 milliGRAM(s) Oral at bedtime  levothyroxine 225 MICROGram(s) Oral daily  predniSONE   Tablet 5 milliGRAM(s) Oral daily      influenza   Vaccine 0.5 milliLiter(s) IntraMuscular once    lidocaine   Patch 1 Patch Transdermal daily  vitamin A &amp; D Ointment 1 Application(s) Topical three times a day            ICU Vital Signs Last 24 Hrs  T(C): 37.1 (13 Feb 2019 10:00), Max: 37.1 (13 Feb 2019 10:00)  T(F): 98.7 (13 Feb 2019 10:00), Max: 98.7 (13 Feb 2019 10:00)  HR: 74 (13 Feb 2019 12:00) (68 - 83)  BP: 82/50 (13 Feb 2019 12:00) (81/49 - 109/53)  BP(mean): 62 (13 Feb 2019 12:00) (60 - 76)  ABP: --  ABP(mean): --  RR: 24 (13 Feb 2019 12:00) (16 - 34)  SpO2: 94% (13 Feb 2019 12:00) (85% - 100%)      ABG - ( 12 Feb 2019 21:36 )  pH, Arterial: 7.47  pH, Blood: x     /  pCO2: 30    /  pO2: 62    / HCO3: 24    / Base Excess: -0.9  /  SaO2: 92                  I&O's Detail    12 Feb 2019 07:01  -  13 Feb 2019 07:00  --------------------------------------------------------  IN:    heparin  Infusion.: 102 mL    milrinone  Infusion: 203.7 mL    Oral Fluid: 240 mL  Total IN: 545.7 mL    OUT:    Voided: 2550 mL  Total OUT: 2550 mL    Total NET: -2004.3 mL      13 Feb 2019 07:01  -  13 Feb 2019 13:58  --------------------------------------------------------  IN:    heparin  Infusion.: 36 mL    milrinone  Infusion: 51.6 mL    milrinone  Infusion: 9.7 mL    Oral Fluid: 540 mL  Total IN: 637.3 mL    OUT:    Voided: 300 mL  Total OUT: 300 mL    Total NET: 337.3 mL            LABS:                        10.5   7.0   )-----------( 269      ( 13 Feb 2019 05:22 )             31.7     02-13    141  |  104  |  50.0<H>  ----------------------------<  109  4.0   |  22.0  |  1.43<H>    Ca    9.4      13 Feb 2019 05:22  Phos  3.3     02-13  Mg     2.3     02-13    TPro  6.5<L>  /  Alb  3.1<L>  /  TBili  1.7  /  DBili  x   /  AST  41<H>  /  ALT  40<H>  /  AlkPhos  227<H>  02-12      CARDIAC MARKERS ( 12 Feb 2019 21:58 )  x     / 12.38 ng/mL / x     / x     / x      CARDIAC MARKERS ( 12 Feb 2019 16:49 )  x     / 12.57 ng/mL / x     / x     / x      CARDIAC MARKERS ( 12 Feb 2019 12:16 )  x     / 11.93 ng/mL / x     / x     / x          CAPILLARY BLOOD GLUCOSE      POCT Blood Glucose.: 136 mg/dL (12 Feb 2019 08:04)    PT/INR - ( 12 Feb 2019 21:57 )   PT: 14.0 sec;   INR: 1.21 ratio         PTT - ( 13 Feb 2019 05:22 )  PTT:48.8 sec    CULTURES:      Physical Examination:    General: No acute distress.      HEENT: Pupils equal, reactive to light.  Symmetric.    PULM: diminished but clear to auscultation bilaterally, no significant sputum production    NECK: Supple, no lymphadenopathy, trachea midline    CVS: Regular rate and rhythm, +murmur    GI: Soft, nondistended, nontender, normoactive bowel sounds, no masses    EXT: ++ edema, nontender    SKIN: Warm and well perfused, no rashes noted.    NEURO: Alert, oriented, interactive, nonfocal    DEVICES:     RADIOLOGY:   < from: Xray Chest 1 View AP/PA. (02.12.19 @ 08:31) >  Findings:     Bilateral diffuse interstitial densities and central   pulmonic congestion is observed. Subsegmental atelectasis left lower lobe   obscuring left diaphragm. Left pleural effusion.  .       Heart size is   unremarkable.     The thorax is normal for age.    Impression: Diffuse interstitial densities and congestion unchanged.   Subsegmental atelectasis left lower lobe and increased small left pleural   effusion.                JANELLE GOODMAN M.D., ATTENDING RADIOLOGIST  This document has been electronically signed. Feb 12 2019  9:06AM        < end of copied text >    CRITICAL CARE TIME SPENT: Patient is a 73y old  Female who presents with a chief complaint of back pain and SOB (13 Feb 2019 13:14)      BRIEF HOSPITAL COURSE: 73y Female PMHX HTN, HLD, hypothyroidism, metastatic Lung CA (received chemo and XRT), former smoker, who presented to [facility] ED with upperback pain.  Pt has had progressively worsening back pain for the past few days.  Her pain was b/l shoulders intermittently occurring  She went to the chiropractor today and had worsening of her symptoms.  In the ED she has new ST depression in the anterior/lateral leads with trop of 2.0.  She was in respiratory distress requiring bipap and lasix.  She does mention worsening sob/LE edema for the past few days. Pt taken to cath lab 1 OSMAR placed to circumflex and was monitored in ICU briefly post cath, returned to ICU for worsening SOB due to decompensated heart failure requiring inotropic support.       Events last 24 hours: remains on milrinone, transitioned from BIPAP to high flow    PAST MEDICAL & SURGICAL HISTORY:  Carcinoma: metastatic stage 4 with mets to the iliac bone, colon, and lung  Iliac crest bone pain: cancer, right  Anxiety  Hypothyroid  Hypertension  No significant past surgical history        Medications:    furosemide   Injectable 40 milliGRAM(s) IV Push once  furosemide   Injectable 40 milliGRAM(s) IV Push two times a day  milrinone Infusion 0.5 MICROgram(s)/kG/Min IV Continuous <Continuous>    ALBUTerol    90 MICROgram(s) HFA Inhaler 1 Puff(s) Inhalation every 4 hours  ALBUTerol/ipratropium for Nebulization 3 milliLiter(s) Nebulizer every 6 hours  guaiFENesin  milliGRAM(s) Oral every 12 hours  tiotropium 18 MICROgram(s) Capsule 1 Capsule(s) Inhalation daily    ALPRAZolam 0.25 milliGRAM(s) Oral three times a day PRN  cyclobenzaprine 10 milliGRAM(s) Oral three times a day PRN  morphine  - Injectable 2 milliGRAM(s) IV Push once  ondansetron Injectable 4 milliGRAM(s) IV Push every 8 hours PRN  oxyCODONE    5 mG/acetaminophen 325 mG 1 Tablet(s) Oral every 6 hours PRN  pregabalin 25 milliGRAM(s) Oral every 8 hours      aspirin  chewable 81 milliGRAM(s) Oral daily  heparin  Injectable 5000 Unit(s) SubCutaneous every 8 hours  ticagrelor 90 milliGRAM(s) Oral two times a day    bisacodyl Suppository 10 milliGRAM(s) Rectal daily PRN  docusate sodium 100 milliGRAM(s) Oral two times a day  pantoprazole    Tablet 40 milliGRAM(s) Oral before breakfast  senna 2 Tablet(s) Oral at bedtime PRN      atorvastatin 40 milliGRAM(s) Oral at bedtime  levothyroxine 225 MICROGram(s) Oral daily  predniSONE   Tablet 5 milliGRAM(s) Oral daily      influenza   Vaccine 0.5 milliLiter(s) IntraMuscular once    lidocaine   Patch 1 Patch Transdermal daily  vitamin A &amp; D Ointment 1 Application(s) Topical three times a day            ICU Vital Signs Last 24 Hrs  T(C): 37.1 (13 Feb 2019 10:00), Max: 37.1 (13 Feb 2019 10:00)  T(F): 98.7 (13 Feb 2019 10:00), Max: 98.7 (13 Feb 2019 10:00)  HR: 74 (13 Feb 2019 12:00) (68 - 83)  BP: 82/50 (13 Feb 2019 12:00) (81/49 - 109/53)  BP(mean): 62 (13 Feb 2019 12:00) (60 - 76)  ABP: --  ABP(mean): --  RR: 24 (13 Feb 2019 12:00) (16 - 34)  SpO2: 94% (13 Feb 2019 12:00) (85% - 100%)      ABG - ( 12 Feb 2019 21:36 )  pH, Arterial: 7.47  pH, Blood: x     /  pCO2: 30    /  pO2: 62    / HCO3: 24    / Base Excess: -0.9  /  SaO2: 92                  I&O's Detail    12 Feb 2019 07:01  -  13 Feb 2019 07:00  --------------------------------------------------------  IN:    heparin  Infusion.: 102 mL    milrinone  Infusion: 203.7 mL    Oral Fluid: 240 mL  Total IN: 545.7 mL    OUT:    Voided: 2550 mL  Total OUT: 2550 mL    Total NET: -2004.3 mL      13 Feb 2019 07:01  -  13 Feb 2019 13:58  --------------------------------------------------------  IN:    heparin  Infusion.: 36 mL    milrinone  Infusion: 51.6 mL    milrinone  Infusion: 9.7 mL    Oral Fluid: 540 mL  Total IN: 637.3 mL    OUT:    Voided: 300 mL  Total OUT: 300 mL    Total NET: 337.3 mL            LABS:                        10.5   7.0   )-----------( 269      ( 13 Feb 2019 05:22 )             31.7     02-13    141  |  104  |  50.0<H>  ----------------------------<  109  4.0   |  22.0  |  1.43<H>    Ca    9.4      13 Feb 2019 05:22  Phos  3.3     02-13  Mg     2.3     02-13    TPro  6.5<L>  /  Alb  3.1<L>  /  TBili  1.7  /  DBili  x   /  AST  41<H>  /  ALT  40<H>  /  AlkPhos  227<H>  02-12      CARDIAC MARKERS ( 12 Feb 2019 21:58 )  x     / 12.38 ng/mL / x     / x     / x      CARDIAC MARKERS ( 12 Feb 2019 16:49 )  x     / 12.57 ng/mL / x     / x     / x      CARDIAC MARKERS ( 12 Feb 2019 12:16 )  x     / 11.93 ng/mL / x     / x     / x          CAPILLARY BLOOD GLUCOSE      POCT Blood Glucose.: 136 mg/dL (12 Feb 2019 08:04)    PT/INR - ( 12 Feb 2019 21:57 )   PT: 14.0 sec;   INR: 1.21 ratio         PTT - ( 13 Feb 2019 05:22 )  PTT:48.8 sec    CULTURES:      Physical Examination:    General: No acute distress.      HEENT: Pupils equal, reactive to light.  Symmetric.    PULM: diminished but clear to auscultation bilaterally, no significant sputum production    NECK: Supple, no lymphadenopathy, trachea midline    CVS: Regular rate and rhythm, +murmur    GI: Soft, nondistended, nontender, normoactive bowel sounds, no masses    EXT: ++ edema, nontender    SKIN: Warm and well perfused, no rashes noted.    NEURO: Alert, oriented, interactive, nonfocal    DEVICES:     RADIOLOGY:   < from: Xray Chest 1 View AP/PA. (02.12.19 @ 08:31) >  Findings:     Bilateral diffuse interstitial densities and central   pulmonic congestion is observed. Subsegmental atelectasis left lower lobe   obscuring left diaphragm. Left pleural effusion.  .       Heart size is   unremarkable.     The thorax is normal for age.    Impression: Diffuse interstitial densities and congestion unchanged.   Subsegmental atelectasis left lower lobe and increased small left pleural   effusion.                JANELLE GOODMAN M.D., ATTENDING RADIOLOGIST  This document has been electronically signed. Feb 12 2019  9:06AM        < end of copied text >    CRITICAL CARE TIME SPENT: 35

## 2019-02-13 NOTE — PROGRESS NOTE ADULT - SUBJECTIVE AND OBJECTIVE BOX
Formerly [facility]                              [address] [place] [address]                                                 PHONE: [phone]                                                 FAX: [phone]  -----------------------------------------------------------------------------------------------  Pt seen and examined. FU for CAD    Overnight events/Complaints: Pt remains on high flow at 50%. Breathing better. Diuresing well.     Vital Signs Last 24 Hrs  T(C): 36.7 (13 Feb 2019 06:00), Max: 36.9 (12 Feb 2019 10:00)  T(F): 98.1 (13 Feb 2019 06:00), Max: 98.4 (12 Feb 2019 10:00)  HR: 78 (13 Feb 2019 08:00) (64 - 83)  BP: 100/52 (13 Feb 2019 08:00) (81/49 - 109/53)  BP(mean): 65 (13 Feb 2019 08:00) (60 - 76)  RR: 23 (13 Feb 2019 08:00) (16 - 31)  SpO2: 94% (13 Feb 2019 08:00) (85% - 100%)  I&O's Summary    12 Feb 2019 07:01  -  13 Feb 2019 07:00  --------------------------------------------------------  IN: 545.7 mL / OUT: 2550 mL / NET: -2004.3 mL    13 Feb 2019 07:01  -  13 Feb 2019 08:49  --------------------------------------------------------  IN: 21.7 mL / OUT: 150 mL / NET: -128.3 mL        PHYSICAL EXAM:  Constitutional: Elderly woman sitting up in bed  HEENT:     Head: Normocephalic and atraumatic  Neck: supple. + JVD  Cardiovascular: Normal S1 S2, No murmurs  Respiratory: Lungs clear to auscultation; basal crepts.  Gastrointestinal:  Soft, Non-tender, + BS	  Musculoskeletal: Normal range of motion. No edema  Skin: Warm and dry. No cyanosis . No diaphoresis.  Neurologic: Alert oriented to time place and person. Normal strength and no tremor.        LABS:                        10.5   7.0   )-----------( 269      ( 13 Feb 2019 05:22 )             31.7     02-13    141  |  104  |  50.0<H>  ----------------------------<  109  4.0   |  22.0  |  1.43<H>    Ca    9.4      13 Feb 2019 05:22  Phos  3.3     02-13  Mg     2.3     02-13    TPro  6.5<L>  /  Alb  3.1<L>  /  TBili  1.7  /  DBili  x   /  AST  41<H>  /  ALT  40<H>  /  AlkPhos  227<H>  02-12    CARDIAC MARKERS ( 12 Feb 2019 21:58 )  x     / 12.38 ng/mL / x     / x     / x      CARDIAC MARKERS ( 12 Feb 2019 16:49 )  x     / 12.57 ng/mL / x     / x     / x      CARDIAC MARKERS ( 12 Feb 2019 12:16 )  x     / 11.93 ng/mL / x     / x     / x          PT/INR - ( 12 Feb 2019 21:57 )   PT: 14.0 sec;   INR: 1.21 ratio         PTT - ( 13 Feb 2019 05:22 )  PTT:48.8 sec    RADIOLOGY & ADDITIONAL STUDIES: (reviewed)    CARDIOLOGY TESTING:(reviewed)     TELEMETRY (Independent visualization) : No arrhythmias    ECHOCARDIOGRAM:  < from: TTE Echo Complete w/Doppler (02.12.19 @ 09:05) >  LV Wall Scoring:  The basal and mid anterolateral wall, mid anteroseptal segment, basal  inferoseptal segment, basal inferolateral segment, apical lateral   segment, and  basal inferior segment are akinetic.    Right Ventricle: The right ventricular size is normal.  Pericardium: A small pericardial effusion is present. The pericardial   effusion is anterior to the right ventricle.  Mitral Valve: Moderate to severe mitral valve regurgitation is seen.  Tricuspid Valve: Adequate TR velocity was not obtained to accurately   assess RVSP.  Venous: The inferior vena cava was dilated, with respiratory size   variation less than 50%.       Summary:   1. Left ventricular ejection fraction, by visual estimation, is 25 to   30%.   2. Technically difficult study.   3. Multiple left ventricular regional wall motion abnormalities exist.   See wall motion findings.    < end of copied text >    CARDIAC CATHETERIZATION:  < from: Cardiac Cath Lab - Adult (02.08.19 @ 18:08) >  VENTRICLES: LVEF 40%  CORONARY VESSELS: The coronary circulation is right dominant.  LM:   --  LM: Normal.  LAD:   --  LAD: Angiography showed minor luminal irregularities with no  flow limiting lesions.  CX:   --  Proximal circumflex: There was a 100 % stenosis.  RCA:   --  Mid RCA: There was a 100 % stenosis. There was poor collateral  blood supply to the distal myocardium.  COMPLICATIONS: There were no complications.  DIAGNOSTIC IMPRESSIONS: Acute occlusion of proximal LCX and  of RCA with  left to right collaterals. The LCX was treated with  thrombectomy/PTCA/STENT (OSMAR-Resolute) with good result.  DIAGNOSTIC RECOMMENDATIONS: ASA and Brilinta  INTERVENTIONAL IMPRESSIONS: Acute occlusion of proximal LCX and  of RCA  with left to right collaterals. The LCX was treated with  thrombectomy/PTCA/STENT (OSMAR-Resolute) with good result.  INTERVENTIONAL RECOMMENDATIONS: ASA and Brilinta    < end of copied text >      MEDICATIONS:(reviewed)  MEDICATIONS  (STANDING):  ALBUTerol    90 MICROgram(s) HFA Inhaler 1 Puff(s) Inhalation every 4 hours  ALBUTerol/ipratropium for Nebulization 3 milliLiter(s) Nebulizer every 6 hours  aspirin  chewable 81 milliGRAM(s) Oral daily  atorvastatin 40 milliGRAM(s) Oral at bedtime  furosemide   Injectable 40 milliGRAM(s) IV Push once  furosemide   Injectable 40 milliGRAM(s) IV Push two times a day  guaiFENesin  milliGRAM(s) Oral every 12 hours  heparin  Infusion.  Unit(s)/Hr (10 mL/Hr) IV Continuous <Continuous>  influenza   Vaccine 0.5 milliLiter(s) IntraMuscular once  levothyroxine 225 MICROGram(s) Oral daily  lidocaine   Patch 1 Patch Transdermal daily  milrinone Infusion 0.5 MICROgram(s)/kG/Min (12.93 mL/Hr) IV Continuous <Continuous>  morphine  - Injectable 2 milliGRAM(s) IV Push once  pantoprazole    Tablet 40 milliGRAM(s) Oral before breakfast  predniSONE   Tablet 5 milliGRAM(s) Oral daily  pregabalin 25 milliGRAM(s) Oral every 8 hours  ticagrelor 90 milliGRAM(s) Oral two times a day  tiotropium 18 MICROgram(s) Capsule 1 Capsule(s) Inhalation daily  vitamin A &amp; D Ointment 1 Application(s) Topical three times a day    MEDICATIONS  (PRN):  ALPRAZolam 0.25 milliGRAM(s) Oral three times a day PRN anxiety  cyclobenzaprine 10 milliGRAM(s) Oral three times a day PRN Muscle Spasm  heparin  Injectable 5300 Unit(s) IV Push every 6 hours PRN For aPTT less than 40  ondansetron Injectable 4 milliGRAM(s) IV Push every 8 hours PRN Nausea and/or Vomiting  oxyCODONE    5 mG/acetaminophen 325 mG 1 Tablet(s) Oral every 6 hours PRN Severe Pain (7 - 10)  senna 2 Tablet(s) Oral at bedtime PRN Constipation      ASSESSMENT AND PLAN:    73y Female with prior history of HTN, HLD, hypothyroidism, metastatic Lung CA (received chemo and XRT), former smoker with acute systolic HF insetting of LV dysfunction Ef 35-40%  and mod MR, EDER post cath  HFrEF, s/p recent Cx PCI with total RCA    1. Still remains in volume overload  2. Acceptable urine output on lasix. Will give 1 more dose at 1 pm and continue lasix 40 mg BID  3. Will increase milrinone to 0.5 mcg/kg/min  4. Continue ASA and Brilinta  5. Renal function stable  6. If BP remains stable, will consider adding Imdur for afterload reduction in AM  7. Discussed with family at bedside that IABP may have to be considered if HF does not improve with milrinone and lasix for now.    d/w ICU team. d/w family at bedside.    Plan discussed with medicine team

## 2019-02-13 NOTE — PROGRESS NOTE ADULT - ASSESSMENT
74 yo female with hx of metastatic lung cancer s/p radiation and immunotherapy (Opdivo) complicated by pneumonitis, COPD, CAD, CHFrEF, admitted initially on 2/8 with back pain and dyspnea, found to have NSTEMI and underwent LHC with OSMAR placement in LCA.  She was treated for acute CHF exacerbation/pulmonary edema with bipap and diuretics.    Transferred back to ICU on 2/12 due to worsening respiratory failure again requiring bipap, also started on inotrope.

## 2019-02-13 NOTE — PROGRESS NOTE ADULT - SUBJECTIVE AND OBJECTIVE BOX
FOLLOW UP VISIT    INTERVAL HPI/OVERNIGHT EVENTS:  Weaned off BIPAP to HFNC 50% this morning.   Seen and examined earlier this afternoon. OOB to chair.  at bedside.   Pt reports still with intermittent dyspnea and chest tightness. Otherwise, offers no other acute complaints.     Present Symptoms:     Dyspnea: Yes, on HFNC    Nausea/Vomiting: No  Anxiety:  intermittent  Fatigue: Yes   Insomnia: No  Pain: No    Review of Systems: Reviewed, All others negative    MEDICATIONS  (STANDING):  ALBUTerol    90 MICROgram(s) HFA Inhaler 1 Puff(s) Inhalation every 4 hours  ALBUTerol/ipratropium for Nebulization 3 milliLiter(s) Nebulizer every 6 hours  aspirin  chewable 81 milliGRAM(s) Oral daily  atorvastatin 40 milliGRAM(s) Oral at bedtime  docusate sodium 100 milliGRAM(s) Oral two times a day  furosemide   Injectable 40 milliGRAM(s) IV Push once  furosemide   Injectable 40 milliGRAM(s) IV Push two times a day  guaiFENesin  milliGRAM(s) Oral every 12 hours  heparin  Injectable 5000 Unit(s) SubCutaneous every 8 hours  influenza   Vaccine 0.5 milliLiter(s) IntraMuscular once  levothyroxine 225 MICROGram(s) Oral daily  lidocaine   Patch 1 Patch Transdermal daily  milrinone Infusion 0.5 MICROgram(s)/kG/Min (12.93 mL/Hr) IV Continuous <Continuous>  morphine  - Injectable 2 milliGRAM(s) IV Push once  pantoprazole    Tablet 40 milliGRAM(s) Oral before breakfast  predniSONE   Tablet 5 milliGRAM(s) Oral daily  pregabalin 25 milliGRAM(s) Oral every 8 hours  ticagrelor 90 milliGRAM(s) Oral two times a day  tiotropium 18 MICROgram(s) Capsule 1 Capsule(s) Inhalation daily  vitamin A &amp; D Ointment 1 Application(s) Topical three times a day    MEDICATIONS  (PRN):  ALPRAZolam 0.25 milliGRAM(s) Oral three times a day PRN anxiety  bisacodyl Suppository 10 milliGRAM(s) Rectal daily PRN Constipation  cyclobenzaprine 10 milliGRAM(s) Oral three times a day PRN Muscle Spasm  ondansetron Injectable 4 milliGRAM(s) IV Push every 8 hours PRN Nausea and/or Vomiting  oxyCODONE    5 mG/acetaminophen 325 mG 1 Tablet(s) Oral every 6 hours PRN Severe Pain (7 - 10)  senna 2 Tablet(s) Oral at bedtime PRN Constipation      PHYSICAL EXAM:    Vital Signs Last 24 Hrs  T(C): 37.1 (13 Feb 2019 10:00), Max: 37.1 (13 Feb 2019 10:00)  T(F): 98.7 (13 Feb 2019 10:00), Max: 98.7 (13 Feb 2019 10:00)  HR: 74 (13 Feb 2019 12:00) (68 - 83)  BP: 82/50 (13 Feb 2019 12:00) (81/49 - 109/53)  BP(mean): 62 (13 Feb 2019 12:00) (60 - 76)  RR: 24 (13 Feb 2019 12:00) (16 - 34)  SpO2: 94% (13 Feb 2019 12:00) (85% - 100%)    General: chronically ill. Resting comfortably. No acute distress.   HEENT: mucous membrane moist. HFNC  Lungs: coarse breath sounds. HFNC. non-labored. Notable conversation dyspnea that improves with rest.  CV: +s1/s2. Regular rate and rhythm.    GI:+ bowel sound. abdomen soft, non-tender  MSK: Moves all 4 extremities.  No cyanosis or edema. +weakness.   Neuro: awake, alert, oriented to person, place, time (year only) and situation; interactive and responds appropriately.   Skin: warm and dry      LABS:                          10.5   7.0   )-----------( 269      ( 13 Feb 2019 05:22 )             31.7     02-13    141  |  104  |  50.0<H>  ----------------------------<  109  4.0   |  22.0  |  1.43<H>    Ca    9.4      13 Feb 2019 05:22  Phos  3.3     02-13  Mg     2.3     02-13    TPro  6.5<L>  /  Alb  3.1<L>  /  TBili  1.7  /  DBili  x   /  AST  41<H>  /  ALT  40<H>  /  AlkPhos  227<H>  02-12    PT/INR - ( 12 Feb 2019 21:57 )   PT: 14.0 sec;   INR: 1.21 ratio         PTT - ( 13 Feb 2019 05:22 )  PTT:48.8 sec    I&O's Summary    12 Feb 2019 07:01  -  13 Feb 2019 07:00  --------------------------------------------------------  IN: 545.7 mL / OUT: 2550 mL / NET: -2004.3 mL    13 Feb 2019 07:01  -  13 Feb 2019 13:15  --------------------------------------------------------  IN: 637.3 mL / OUT: 300 mL / NET: 337.3 mL    RADIOLOGY & ADDITIONAL STUDIES:     CXR 2/12/19  Findings:     Bilateral diffuse interstitial densities and central   pulmonic congestion is observed. Subsegmental atelectasis left lower lobe   obscuring left diaphragm. Left pleural effusion.  .  Heart size is unremarkable.     The thorax is normal for age.    Impression: Diffuse interstitial densities and congestion unchanged.   Subsegmental atelectasis left lower lobe and increased small left pleural   effusion.      ADVANCE DIRECTIVES:   DNR YES NO  Completed on:                     MOLST  YES NO   Completed on:  Living Will  YES NO   Completed on:

## 2019-02-13 NOTE — PROGRESS NOTE ADULT - ASSESSMENT
Mrs. Cam is a 73 year old female with metastatic cancer (treated in past with chemo/RT), HTN, CAD, HFrEF, ES COPD on home O2 admitted for back pain and found to have NSTEMI. S/P cardiac cath on 2/8 with occ of prox LCX and  of RCA s/p thrombectomy with stent. Course complicated by acute decompensated heart failure s/p diuretics and acute respiratory failure requiring bipap. RRT 2/12 again for acute respiratory failure requiring bipap support and transferred to ICU level of care. GOC discussion initiated by MICU and primary team earlier today, code status addressed and pt/family opted for DNR/DNI, Palliative care following for support and ongoing goc. MOLST completed by MICU team with patient reflecting DNR/DNI.      PLAN:     Acute respiratory failure   ES COPD  Hx of Radiation Pneumonitis  Acute on Chronic Heart Failure   -  TTE/Cath with LVEF 35-40%  - s/p RRT on 2/12 requring BIPAP; weaned to HFNC this morning.   - c/w O2 supplement, IV milrinone and IV diuretics, nebs  - Cardio on board, note reviewed and input appreciated: D/W pt regarding IABP if HF does not improve with current pharmacologic interventions.   - management per MICU      NSTEMI  - s/p cardiac cath 2/8 with thrombectomy and OSMAR  - c/w DAPT  - plan per cardiology    EDER  - improving   - etiology likely related to contrast induced from recent cath  - avoid nephrotoxic agents  - serial Cr/GFR    Palliative Care Encounter  Pt is resting comfortable. Pt expressed good understanding of her medical conditions, current hospital course and treatment. She reports wanting to get better and just go home but understands her cardiac/lung function are not optimized at present time. She was able to relayed back to me about conversation earlier today by cardiology regarding "balloon procedure" if her breathing and congestion does not improve. When asked what she thought about this option she verbalized "I don't think I would want that." She further elaborate she does not feel it will likely improve her overall quality of life. She would like to see if current level of medical management can optimize her respiratory status for now. Reassurance and emotional support provided.     - A HCP/living will is in the chart  - HCP designated as  Marcello (1st) and alternate as daughter Kiah Fritz [phone].   - MOLST completed by MICU team, DNR/DNI  - Will follow along for ongoing goc.

## 2019-02-13 NOTE — DIETITIAN INITIAL EVALUATION ADULT. - ETIOLOGY
Related to inadequate energy intake with increased needs in setting of end stage COPD and metastatic lung cancer

## 2019-02-13 NOTE — DIETITIAN INITIAL EVALUATION ADULT. - PERTINENT LABORATORY DATA
02-13 Na141 mmol/L Glu 109 mg/dL K+ 4.0 mmol/L Cr  1.43 mg/dL<H> BUN 50.0 mg/dL<H> Phos 3.3 mg/dL Alb n/a   PAB n/a

## 2019-02-14 LAB
ANION GAP SERPL CALC-SCNC: 14 MMOL/L — SIGNIFICANT CHANGE UP (ref 5–17)
BASOPHILS # BLD AUTO: 0 K/UL — SIGNIFICANT CHANGE UP (ref 0–0.2)
BASOPHILS NFR BLD AUTO: 0.3 % — SIGNIFICANT CHANGE UP (ref 0–2)
BUN SERPL-MCNC: 55 MG/DL — HIGH (ref 8–20)
CALCIUM SERPL-MCNC: 9.2 MG/DL — SIGNIFICANT CHANGE UP (ref 8.6–10.2)
CHLORIDE SERPL-SCNC: 104 MMOL/L — SIGNIFICANT CHANGE UP (ref 98–107)
CO2 SERPL-SCNC: 21 MMOL/L — LOW (ref 22–29)
CREAT SERPL-MCNC: 1.68 MG/DL — HIGH (ref 0.5–1.3)
EOSINOPHIL # BLD AUTO: 0.3 K/UL — SIGNIFICANT CHANGE UP (ref 0–0.5)
EOSINOPHIL NFR BLD AUTO: 3.4 % — SIGNIFICANT CHANGE UP (ref 0–6)
GLUCOSE SERPL-MCNC: 115 MG/DL — SIGNIFICANT CHANGE UP (ref 70–115)
HCT VFR BLD CALC: 29.6 % — LOW (ref 37–47)
HGB BLD-MCNC: 10.1 G/DL — LOW (ref 12–16)
LYMPHOCYTES # BLD AUTO: 1 K/UL — SIGNIFICANT CHANGE UP (ref 1–4.8)
LYMPHOCYTES # BLD AUTO: 12.4 % — LOW (ref 20–55)
MAGNESIUM SERPL-MCNC: 2.3 MG/DL — SIGNIFICANT CHANGE UP (ref 1.6–2.6)
MCHC RBC-ENTMCNC: 29.9 PG — SIGNIFICANT CHANGE UP (ref 27–31)
MCHC RBC-ENTMCNC: 34.1 G/DL — SIGNIFICANT CHANGE UP (ref 32–36)
MCV RBC AUTO: 87.6 FL — SIGNIFICANT CHANGE UP (ref 81–99)
MONOCYTES # BLD AUTO: 1 K/UL — HIGH (ref 0–0.8)
MONOCYTES NFR BLD AUTO: 12.1 % — HIGH (ref 3–10)
NEUTROPHILS # BLD AUTO: 5.6 K/UL — SIGNIFICANT CHANGE UP (ref 1.8–8)
NEUTROPHILS NFR BLD AUTO: 71.2 % — SIGNIFICANT CHANGE UP (ref 37–73)
PHOSPHATE SERPL-MCNC: 3.5 MG/DL — SIGNIFICANT CHANGE UP (ref 2.4–4.7)
PLATELET # BLD AUTO: 260 K/UL — SIGNIFICANT CHANGE UP (ref 150–400)
POTASSIUM SERPL-MCNC: 3.6 MMOL/L — SIGNIFICANT CHANGE UP (ref 3.5–5.3)
POTASSIUM SERPL-SCNC: 3.6 MMOL/L — SIGNIFICANT CHANGE UP (ref 3.5–5.3)
RBC # BLD: 3.38 M/UL — LOW (ref 4.4–5.2)
RBC # FLD: 15.1 % — SIGNIFICANT CHANGE UP (ref 11–15.6)
SODIUM SERPL-SCNC: 139 MMOL/L — SIGNIFICANT CHANGE UP (ref 135–145)
WBC # BLD: 7.9 K/UL — SIGNIFICANT CHANGE UP (ref 4.8–10.8)
WBC # FLD AUTO: 7.9 K/UL — SIGNIFICANT CHANGE UP (ref 4.8–10.8)

## 2019-02-14 PROCEDURE — 99232 SBSQ HOSP IP/OBS MODERATE 35: CPT

## 2019-02-14 PROCEDURE — 99233 SBSQ HOSP IP/OBS HIGH 50: CPT

## 2019-02-14 RX ORDER — ACETAMINOPHEN 500 MG
1000 TABLET ORAL ONCE
Qty: 0 | Refills: 0 | Status: COMPLETED | OUTPATIENT
Start: 2019-02-14 | End: 2019-02-14

## 2019-02-14 RX ORDER — FUROSEMIDE 40 MG
40 TABLET ORAL
Qty: 0 | Refills: 0 | Status: DISCONTINUED | OUTPATIENT
Start: 2019-02-14 | End: 2019-02-17

## 2019-02-14 RX ORDER — POTASSIUM CHLORIDE 20 MEQ
10 PACKET (EA) ORAL
Qty: 0 | Refills: 0 | Status: COMPLETED | OUTPATIENT
Start: 2019-02-14 | End: 2019-02-14

## 2019-02-14 RX ADMIN — Medication 3 MILLILITER(S): at 08:21

## 2019-02-14 RX ADMIN — Medication 100 MILLIEQUIVALENT(S): at 06:50

## 2019-02-14 RX ADMIN — Medication 600 MILLIGRAM(S): at 09:19

## 2019-02-14 RX ADMIN — TICAGRELOR 90 MILLIGRAM(S): 90 TABLET ORAL at 17:49

## 2019-02-14 RX ADMIN — Medication 600 MILLIGRAM(S): at 21:51

## 2019-02-14 RX ADMIN — LIDOCAINE 1 PATCH: 4 CREAM TOPICAL at 13:01

## 2019-02-14 RX ADMIN — Medication 25 MILLIGRAM(S): at 05:00

## 2019-02-14 RX ADMIN — Medication 3 MILLILITER(S): at 16:07

## 2019-02-14 RX ADMIN — Medication 100 MILLIGRAM(S): at 05:00

## 2019-02-14 RX ADMIN — Medication 1 APPLICATION(S): at 05:01

## 2019-02-14 RX ADMIN — Medication 1000 MILLIGRAM(S): at 00:30

## 2019-02-14 RX ADMIN — Medication 225 MICROGRAM(S): at 05:00

## 2019-02-14 RX ADMIN — Medication 5 MILLIGRAM(S): at 05:00

## 2019-02-14 RX ADMIN — PANTOPRAZOLE SODIUM 40 MILLIGRAM(S): 20 TABLET, DELAYED RELEASE ORAL at 05:00

## 2019-02-14 RX ADMIN — MILRINONE LACTATE 12.93 MICROGRAM(S)/KG/MIN: 1 INJECTION, SOLUTION INTRAVENOUS at 05:00

## 2019-02-14 RX ADMIN — Medication 40 MILLIGRAM(S): at 05:00

## 2019-02-14 RX ADMIN — HEPARIN SODIUM 5000 UNIT(S): 5000 INJECTION INTRAVENOUS; SUBCUTANEOUS at 05:01

## 2019-02-14 RX ADMIN — HEPARIN SODIUM 5000 UNIT(S): 5000 INJECTION INTRAVENOUS; SUBCUTANEOUS at 13:01

## 2019-02-14 RX ADMIN — MILRINONE LACTATE 5.17 MICROGRAM(S)/KG/MIN: 1 INJECTION, SOLUTION INTRAVENOUS at 08:08

## 2019-02-14 RX ADMIN — Medication 3 MILLILITER(S): at 20:41

## 2019-02-14 RX ADMIN — HEPARIN SODIUM 5000 UNIT(S): 5000 INJECTION INTRAVENOUS; SUBCUTANEOUS at 21:51

## 2019-02-14 RX ADMIN — TICAGRELOR 90 MILLIGRAM(S): 90 TABLET ORAL at 05:01

## 2019-02-14 RX ADMIN — Medication 81 MILLIGRAM(S): at 13:01

## 2019-02-14 RX ADMIN — Medication 100 MILLIEQUIVALENT(S): at 08:50

## 2019-02-14 RX ADMIN — Medication 400 MILLIGRAM(S): at 00:00

## 2019-02-14 RX ADMIN — CYCLOBENZAPRINE HYDROCHLORIDE 10 MILLIGRAM(S): 10 TABLET, FILM COATED ORAL at 04:25

## 2019-02-14 RX ADMIN — OXYCODONE AND ACETAMINOPHEN 1 TABLET(S): 5; 325 TABLET ORAL at 04:25

## 2019-02-14 RX ADMIN — Medication 1 APPLICATION(S): at 21:53

## 2019-02-14 RX ADMIN — Medication 1 APPLICATION(S): at 13:01

## 2019-02-14 RX ADMIN — OXYCODONE AND ACETAMINOPHEN 1 TABLET(S): 5; 325 TABLET ORAL at 03:28

## 2019-02-14 RX ADMIN — Medication 100 MILLIGRAM(S): at 17:49

## 2019-02-14 RX ADMIN — Medication 3 MILLILITER(S): at 02:48

## 2019-02-14 RX ADMIN — Medication 40 MILLIGRAM(S): at 17:51

## 2019-02-14 RX ADMIN — Medication 25 MILLIGRAM(S): at 22:01

## 2019-02-14 RX ADMIN — ATORVASTATIN CALCIUM 40 MILLIGRAM(S): 80 TABLET, FILM COATED ORAL at 21:51

## 2019-02-14 RX ADMIN — Medication 25 MILLIGRAM(S): at 13:03

## 2019-02-14 NOTE — PROGRESS NOTE ADULT - SUBJECTIVE AND OBJECTIVE BOX
Patient is a 73y old  Female who presents with a chief complaint of back pain and SOB (14 Feb 2019 08:38)    BRIEF HOSPITAL COURSE: 73y Female PMHX HTN, HLD, hypothyroidism, metastatic Lung CA (received chemo and XRT), former smoker, who presented to [facility] ED with upperback pain.  Pt has had progressively worsening back pain for the past few days.  Her pain was b/l shoulders intermittently occurring  She went to the chiropractor today and had worsening of her symptoms.  In the ED she has new ST depression in the anterior/lateral leads with trop of 2.0.  She was in respiratory distress requiring bipap and lasix.  She does mention worsening sob/LE edema for the past few days. Pt taken to cath lab 1 OSMAR placed to circumflex and was monitored in ICU briefly post cath, returned to ICU for worsening SOB due to decompensated heart failure requiring inotropic support.     Events last 24 hours: c/o right leg/hip pain, on conventional nasal oxygen now    PAST MEDICAL & SURGICAL HISTORY:  Carcinoma: metastic stage 4 with stephanie to the iliac bone, colon, and lung  Iliac crest bone pain: cancer, right  Anxiety  Hypothyroid  Hypertension  No significant past surgical history        Medications:    furosemide    Tablet 40 milliGRAM(s) Oral two times a day  milrinone Infusion 0.2 MICROgram(s)/kG/Min IV Continuous <Continuous>    ALBUTerol    90 MICROgram(s) HFA Inhaler 1 Puff(s) Inhalation every 4 hours  ALBUTerol/ipratropium for Nebulization 3 milliLiter(s) Nebulizer every 6 hours  guaiFENesin  milliGRAM(s) Oral every 12 hours  tiotropium 18 MICROgram(s) Capsule 1 Capsule(s) Inhalation daily    ALPRAZolam 0.25 milliGRAM(s) Oral three times a day PRN  cyclobenzaprine 10 milliGRAM(s) Oral three times a day PRN  ondansetron Injectable 4 milliGRAM(s) IV Push every 8 hours PRN  oxyCODONE    5 mG/acetaminophen 325 mG 1 Tablet(s) Oral every 6 hours PRN  pregabalin 25 milliGRAM(s) Oral every 8 hours      aspirin  chewable 81 milliGRAM(s) Oral daily  heparin  Injectable 5000 Unit(s) SubCutaneous every 8 hours  ticagrelor 90 milliGRAM(s) Oral two times a day    bisacodyl Suppository 10 milliGRAM(s) Rectal daily PRN  docusate sodium 100 milliGRAM(s) Oral two times a day  pantoprazole    Tablet 40 milliGRAM(s) Oral before breakfast  senna 2 Tablet(s) Oral at bedtime PRN      atorvastatin 40 milliGRAM(s) Oral at bedtime  levothyroxine 225 MICROGram(s) Oral daily  predniSONE   Tablet 5 milliGRAM(s) Oral daily      influenza   Vaccine 0.5 milliLiter(s) IntraMuscular once    lidocaine   Patch 1 Patch Transdermal daily  vitamin A &amp; D Ointment 1 Application(s) Topical three times a day            ICU Vital Signs Last 24 Hrs  T(C): 36.4 (14 Feb 2019 10:00), Max: 37 (13 Feb 2019 14:00)  T(F): 97.5 (14 Feb 2019 10:00), Max: 98.6 (13 Feb 2019 14:00)  HR: 74 (14 Feb 2019 08:21) (67 - 87)  BP: 95/63 (14 Feb 2019 08:00) (78/41 - 112/53)  BP(mean): 71 (14 Feb 2019 08:00) (52 - 77)  ABP: --  ABP(mean): --  RR: 18 (14 Feb 2019 08:00) (15 - 32)  SpO2: 96% (14 Feb 2019 08:21) (92% - 100%)      ABG - ( 12 Feb 2019 21:36 )  pH, Arterial: 7.47  pH, Blood: x     /  pCO2: 30    /  pO2: 62    / HCO3: 24    / Base Excess: -0.9  /  SaO2: 92                  I&O's Detail    13 Feb 2019 07:01  -  14 Feb 2019 07:00  --------------------------------------------------------  IN:    heparin  Infusion.: 36 mL    IV PiggyBack: 200 mL    milrinone  Infusion: 9.7 mL    milrinone  Infusion: 296.7 mL    Oral Fluid: 960 mL  Total IN: 1502.4 mL    OUT:    Voided: 1500 mL  Total OUT: 1500 mL    Total NET: 2.4 mL      14 Feb 2019 07:01  -  14 Feb 2019 12:44  --------------------------------------------------------  IN:    IV PiggyBack: 100 mL    milrinone  Infusion: 15.6 mL    Oral Fluid: 240 mL  Total IN: 355.6 mL    OUT:    Voided: 100 mL  Total OUT: 100 mL    Total NET: 255.6 mL            LABS:                        10.1   7.9   )-----------( 260      ( 14 Feb 2019 03:56 )             29.6     02-14    139  |  104  |  55.0<H>  ----------------------------<  115  3.6   |  21.0<L>  |  1.68<H>    Ca    9.2      14 Feb 2019 03:56  Phos  3.5     02-14  Mg     2.3     02-14    TPro  6.5<L>  /  Alb  3.1<L>  /  TBili  1.7  /  DBili  x   /  AST  41<H>  /  ALT  40<H>  /  AlkPhos  227<H>  02-12      CARDIAC MARKERS ( 12 Feb 2019 21:58 )  x     / 12.38 ng/mL / x     / x     / x      CARDIAC MARKERS ( 12 Feb 2019 16:49 )  x     / 12.57 ng/mL / x     / x     / x          CAPILLARY BLOOD GLUCOSE        PT/INR - ( 12 Feb 2019 21:57 )   PT: 14.0 sec;   INR: 1.21 ratio         PTT - ( 13 Feb 2019 05:22 )  PTT:48.8 sec    CULTURES:      Physical Examination:    General: No acute distress.      HEENT: Pupils equal, reactive to light.  Symmetric.    PULM: Clear to auscultation bilaterally, no significant sputum production    NECK: Supple, no lymphadenopathy, trachea midline    CVS: Regular rate and rhythm, no murmurs, rubs, or gallops    GI: Soft, nondistended, nontender, normoactive bowel sounds, no masses    EXT: No edema, nontender    SKIN: Warm and well perfused, no rashes noted.    NEURO: Alert, oriented, interactive, nonfocal    DEVICES:     RADIOLOGY:     CRITICAL CARE TIME SPENT:

## 2019-02-14 NOTE — PROGRESS NOTE ADULT - ASSESSMENT
1. 72 yo F with nonstemi and circ stent-RCA occluded. LVdysfunction documented by current echo w EF 35% and MR. On milranone and lasix and has diuresed well.  2. underlying metastatic lung Ca and copd.  3. EDER after cath-avoid ace-I/arb for time being.

## 2019-02-14 NOTE — PROGRESS NOTE ADULT - ASSESSMENT
Mrs. Cam is a 73 year old female with metastatic cancer (treated in past with chemo/RT), HTN, CAD, HFrEF, ES COPD on home O2 admitted for back pain and found to have NSTEMI. S/P cardiac cath on 2/8 with occ of prox LCX and  of RCA s/p thrombectomy with stent. Course complicated by acute decompensated heart failure s/p diuretics and acute respiratory failure requiring bipap. RRT 2/12 again for acute respiratory failure requiring bipap support and transferred to ICU level of care. GOC discussion initiated by MICU and primary team earlier today, code status addressed and pt/family opted for DNR/DNI, Palliative care following for support and ongoing goc. MOLST completed by MICU team with patient reflecting DNR/DNI.      PLAN:     Acute respiratory failure   ES COPD  Hx of Radiation Pneumonitis  Acute on Chronic Heart Failure   - TTE/Cath with LVEF 35-40%  - s/p RRT on 2/12 requring BIPAP; off HFNC to NC this morning.   - c/w O2 supplement, IV milrinone (titrated down today) and diuretics, nebs  - Cardio following and has d/w pt regarding IABP if HF does not improve with current pharmacologic interventions.   - plan per MICU      NSTEMI  - s/p cardiac cath 2/8 with thrombectomy and OSMAR  - c/w DAPT  - plan per cardiology    EDER  - etiology likely related to contrast induced from recent cath  - avoid nephrotoxic agents  - serial Cr/GFR    Palliative Care Encounter  Discussed with pt on 2/13, plan of care to optimize her respiratory status with current medical management.     - A HCP/living will is in the chart  - HCP:  Marcello (1st) and alternate as daughter Kiah Fritz [phone].   - MOLST completed by MICU team, DNR/DNI

## 2019-02-14 NOTE — PROGRESS NOTE ADULT - PROBLEM SELECTOR PLAN 2
ASA and brilinta   beta blocker when BP more stable.

## 2019-02-14 NOTE — PROGRESS NOTE ADULT - PROBLEM SELECTOR PLAN 4
Has COPD, hx of radiation pneumonitis.   PRN bipap, low dose prednisone (on 5 mg at home), bronchodilators.

## 2019-02-14 NOTE — PROGRESS NOTE ADULT - PROBLEM SELECTOR PROBLEM 3
Acute pulmonary edema
EDER (acute kidney injury)
EDRE (acute kidney injury)
EDER (acute kidney injury)

## 2019-02-14 NOTE — PROGRESS NOTE ADULT - ASSESSMENT
72 yo female, PMHX HTN, HLD, hypothyroidism, metastatic Lung CA (received chemo and XRT), former smoker, presented with back pain found to have NSTEMI s/p PCI 1DES to Cx, acute hypoxemic respiratory failure secondary to acute pulmonary edema, acute systolic heart failure.       Acute hypoxemic respiratory failure 2/2 pulmonary edema 2/2 Acute systolic heart failure from ICM  treated in MICU with NIPPV  milrinone and diuresis        NSTEMI  s/p recent Cx PCI with total RCA  Continue telemetry   Continue Brilinta 90 mg bid  Aspirin 81mg   Atorvastatin 40mg   c/w metoprolol        COPD- stable  ABG with no resp acidosis  On home O2 PRN   Duoneb   Prednisone 5mg daily    Metastatic Lung CA   Continue pain medication      HTN   Continue Nifedipine XL 90mg     Synthroid   Pt compliant with Synthroid 200mcg at home, TSH 12, will increased dose to 225 mcg, check TSH as outpt    Tobacco use   Nicotine patch removed as not smoked since a few months & no signs of withdrawal    Supportive   DVT prophylaxis: SCD  GI prophylaxis: PPI 40mg   Diet: DASH 72 yo female, PMHX HTN, HLD, hypothyroidism, metastatic Lung CA (received chemo and XRT), former smoker, presented with back pain found to have NSTEMI s/p PCI 1DES to Cx, acute hypoxemic respiratory failure secondary to acute pulmonary edema, acute systolic heart failure.       Acute hypoxemic respiratory failure 2/2 pulmonary edema 2/2 Acute systolic heart failure from ICM  treated in MICU with NIPPV  milrinone and diuresis  Milrinone for 1 more day per cardiology  Prednisone maintainence home dose for chr resp failure  Hi presley O2    KVNG on CKD stg 2  - Cath on 8th and Kvng on 10th  - JOAQUIN vs KVNG- sec to CHF/ Medications  - Crt stabilizing now  - Avoid nephrotoxics as possible and renally dose meds  - Monitor    NSTEMI  s/p recent Cx PCI with total RCA  Continue telemetry   Continue Brilinta 90 mg bid  Aspirin 81mg   Atorvastatin 40mg     Elevated LFTs  - congestive hepatopathy, sec to CHF  - Monitor for resolution  - Cont statins    COPD  ABG with no resp acidosis  At home- On home O2 PRN only  Duoneb   Prednisone 5mg daily  now on Hi presley O2. wean as tolerated    Metastatic Lung CA   Continue pain medication   MOLST signed and pt is DNR/DNI     HTN   Continue Nifedipine XL 90mg     Synthroid   Pt compliant with Synthroid 200mcg at home, TSH 12, increased dose to 225 mcg, check TSH as outpt    Tobacco use   Nicotine patch removed as not smoked since a few months & no signs of withdrawal    Supportive   DVT prophylaxis: SCD  GI prophylaxis: PPI 40mg   Diet: DASH

## 2019-02-14 NOTE — PROGRESS NOTE ADULT - PROBLEM SELECTOR PLAN 3
Post cath, contrast induced?  monitor UOP and lytes closely  renally dose meds
Post cath, contrast induced?  monitor UOP and lytes closely  renally dose meds
Post cath, contrast induced?

## 2019-02-14 NOTE — PROGRESS NOTE ADULT - SUBJECTIVE AND OBJECTIVE BOX
Chief Complaint: chart and hx reviewed.    HPI: 74 yo F admitted with back pain-had ekg changes of ischemia and elevated troponins-cathed with stenting to Circ and % occluded-no intervention. Post procedure developed EDER. No prior cardiac hx. Pt has metastatic lung cancer/hypothyroidism/copd (on home02). Echo shows EF 35% and pt has required iv lasix and milranone with good diuresis.    PAST MEDICAL & SURGICAL HISTORY:  Carcinoma: metastic stage 4 with stephanie to the iliac bone, colon, and lung  Iliac crest bone pain: cancer, right  Anxiety  Hypothyroid  Hypertension  No significant past surgical history      PREVIOUS DIAGNOSTIC TESTING:      ECHO  FINDINGS: EF 35%    STRESS  FINDINGS:    CATHETERIZATION  FINDINGS: see above    MEDICATIONS  (STANDING):  ALBUTerol    90 MICROgram(s) HFA Inhaler 1 Puff(s) Inhalation every 4 hours  ALBUTerol/ipratropium for Nebulization 3 milliLiter(s) Nebulizer every 6 hours  aspirin  chewable 81 milliGRAM(s) Oral daily  atorvastatin 40 milliGRAM(s) Oral at bedtime  docusate sodium 100 milliGRAM(s) Oral two times a day  furosemide    Tablet 40 milliGRAM(s) Oral two times a day  guaiFENesin  milliGRAM(s) Oral every 12 hours  heparin  Injectable 5000 Unit(s) SubCutaneous every 8 hours  influenza   Vaccine 0.5 milliLiter(s) IntraMuscular once  levothyroxine 225 MICROGram(s) Oral daily  lidocaine   Patch 1 Patch Transdermal daily  milrinone Infusion 0.2 MICROgram(s)/kG/Min (5.172 mL/Hr) IV Continuous <Continuous>  pantoprazole    Tablet 40 milliGRAM(s) Oral before breakfast  potassium chloride  10 mEq/100 mL IVPB 10 milliEquivalent(s) IV Intermittent every 1 hour  predniSONE   Tablet 5 milliGRAM(s) Oral daily  pregabalin 25 milliGRAM(s) Oral every 8 hours  ticagrelor 90 milliGRAM(s) Oral two times a day  tiotropium 18 MICROgram(s) Capsule 1 Capsule(s) Inhalation daily  vitamin A &amp; D Ointment 1 Application(s) Topical three times a day    MEDICATIONS  (PRN):  ALPRAZolam 0.25 milliGRAM(s) Oral three times a day PRN anxiety  bisacodyl Suppository 10 milliGRAM(s) Rectal daily PRN Constipation  cyclobenzaprine 10 milliGRAM(s) Oral three times a day PRN Muscle Spasm  ondansetron Injectable 4 milliGRAM(s) IV Push every 8 hours PRN Nausea and/or Vomiting  oxyCODONE    5 mG/acetaminophen 325 mG 1 Tablet(s) Oral every 6 hours PRN Severe Pain (7 - 10)  senna 2 Tablet(s) Oral at bedtime PRN Constipation      FAMILY HISTORY:  No pertinent family history in first degree relatives      ROS: Negative other than as mentioned in HPI.    Vital Signs Last 24 Hrs  T(C): 36.4 (14 Feb 2019 04:00), Max: 37.1 (13 Feb 2019 10:00)  T(F): 97.5 (14 Feb 2019 04:00), Max: 98.7 (13 Feb 2019 10:00)  HR: 74 reg(14 Feb 2019 08:21) (67 - 87)  BP: 95/63 auto cuff (14 Feb 2019 08:00) (78/41 - 112/53)  BP(mean): 71 (14 Feb 2019 08:00) (52 - 77)  RR: 18 on 02 by mask (14 Feb 2019 08:00) (15 - 34)  SpO2: 96% (14 Feb 2019 08:21) (92% - 100%)    PHYSICAL EXAM:  General: Appears well developed, well nourished alert and cooperative. obese elderly afebrile ill appearing F  HEENT: Head; normocephalic, atraumatic.  Eyes;   Pupils reactive, cornea wnl.  Neck; Supple, no nodes adenopathy, no NVD or carotid bruit or thyromegaly.  CARDIOVASCULAR; No murmur, rub, gallop or lift. Normal S1 and S2.  LUNGS; No rales, rhonchi or wheeze. Normal breath sounds bilaterally.  ABDOMEN ; Soft, nontender without mass or organomegaly. bowel sounds normoactive. obese  EXTREMITIES; No clubbing, cyanosis or edema.  ROM normal. groin w/o hematoma or pain.  SKIN; warm and dry with normal turgor.  NEURO; Alert/oriented x 3/normal motor exam.    PSYCH; normal affect.            INTERPRETATION OF TELEMETRY:    ECG: sr with ST depressions 1/L v2-4  cxr extensive interstitial edema ? carcinomatosis  I&O's Detail    13 Feb 2019 07:01  -  14 Feb 2019 07:00  --------------------------------------------------------  IN:    heparin  Infusion.: 36 mL    IV PiggyBack: 200 mL    milrinone  Infusion: 9.7 mL    milrinone  Infusion: 296.7 mL    Oral Fluid: 960 mL  Total IN: 1502.4 mL    OUT:    Voided: 1500 mL  Total OUT: 1500 mL    Total NET: 2.4 mL      14 Feb 2019 07:01  -  14 Feb 2019 08:38  --------------------------------------------------------  IN:    milrinone  Infusion: 5.2 mL  Total IN: 5.2 mL    OUT:  Total OUT: 0 mL    Total NET: 5.2 mL          LABS:                        10.1   7.9   )-----------( 260      ( 14 Feb 2019 03:56 )             29.6     02-14    139  |  104  |  55.0<H>  ----------------------------<  115  3.6   |  21.0<L>  |  1.68<H>    Ca    9.2      14 Feb 2019 03:56  Phos  3.5     02-14  Mg     2.3     02-14    TPro  6.5<L>  /  Alb  3.1<L>  /  TBili  1.7  /  DBili  x   /  AST  41<H>  /  ALT  40<H>  /  AlkPhos  227<H>  02-12    CARDIAC MARKERS ( 12 Feb 2019 21:58 )  x     / 12.38 ng/mL / x     / x     / x      CARDIAC MARKERS ( 12 Feb 2019 16:49 )  x     / 12.57 ng/mL / x     / x     / x      CARDIAC MARKERS ( 12 Feb 2019 12:16 )  x     / 11.93 ng/mL / x     / x     / x          PT/INR - ( 12 Feb 2019 21:57 )   PT: 14.0 sec;   INR: 1.21 ratio         PTT - ( 13 Feb 2019 05:22 )  PTT:48.8 sec    I&O's Summary    13 Feb 2019 07:01  -  14 Feb 2019 07:00  --------------------------------------------------------  IN: 1502.4 mL / OUT: 1500 mL / NET: 2.4 mL    14 Feb 2019 07:01  -  14 Feb 2019 08:38  --------------------------------------------------------  IN: 5.2 mL / OUT: 0 mL / NET: 5.2 mL        RADIOLOGY & ADDITIONAL STUDIES:

## 2019-02-14 NOTE — PROGRESS NOTE ADULT - SUBJECTIVE AND OBJECTIVE BOX
FOLLOW UP VISIT    INTERVAL HPI/OVERNIGHT EVENTS:  Pt seen and examined earlier this morning.   Resting comfortably OOB to chair. Off HFNC to NC at 5L.    at bedside. Pt reports doing well and offered no acute complaints.     Present Symptoms:     Dyspnea: comfortable on O2 via NC    Nausea/Vomiting: No  Anxiety:  intermittent  Fatigue: Yes   Insomnia: No  Pain: No    Review of Systems: Reviewed, All others negative    MEDICATIONS  (STANDING):  ALBUTerol    90 MICROgram(s) HFA Inhaler 1 Puff(s) Inhalation every 4 hours  ALBUTerol/ipratropium for Nebulization 3 milliLiter(s) Nebulizer every 6 hours  aspirin  chewable 81 milliGRAM(s) Oral daily  atorvastatin 40 milliGRAM(s) Oral at bedtime  docusate sodium 100 milliGRAM(s) Oral two times a day  furosemide    Tablet 40 milliGRAM(s) Oral two times a day  guaiFENesin  milliGRAM(s) Oral every 12 hours  heparin  Injectable 5000 Unit(s) SubCutaneous every 8 hours  influenza   Vaccine 0.5 milliLiter(s) IntraMuscular once  levothyroxine 225 MICROGram(s) Oral daily  lidocaine   Patch 1 Patch Transdermal daily  milrinone Infusion 0.2 MICROgram(s)/kG/Min (5.172 mL/Hr) IV Continuous <Continuous>  pantoprazole    Tablet 40 milliGRAM(s) Oral before breakfast  predniSONE   Tablet 5 milliGRAM(s) Oral daily  pregabalin 25 milliGRAM(s) Oral every 8 hours  ticagrelor 90 milliGRAM(s) Oral two times a day  tiotropium 18 MICROgram(s) Capsule 1 Capsule(s) Inhalation daily  vitamin A &amp; D Ointment 1 Application(s) Topical three times a day    MEDICATIONS  (PRN):  ALPRAZolam 0.25 milliGRAM(s) Oral three times a day PRN anxiety  bisacodyl Suppository 10 milliGRAM(s) Rectal daily PRN Constipation  cyclobenzaprine 10 milliGRAM(s) Oral three times a day PRN Muscle Spasm  ondansetron Injectable 4 milliGRAM(s) IV Push every 8 hours PRN Nausea and/or Vomiting  oxyCODONE    5 mG/acetaminophen 325 mG 1 Tablet(s) Oral every 6 hours PRN Severe Pain (7 - 10)  senna 2 Tablet(s) Oral at bedtime PRN Constipation      PHYSICAL EXAM:    Vital Signs Last 24 Hrs  T(C): 36.8 (14 Feb 2019 14:00), Max: 37 (13 Feb 2019 21:00)  T(F): 98.2 (14 Feb 2019 14:00), Max: 98.6 (13 Feb 2019 21:00)  HR: 70 (14 Feb 2019 16:08) (67 - 87)  BP: 114/61 (14 Feb 2019 16:00) (78/41 - 114/61)  BP(mean): 82 (14 Feb 2019 16:00) (52 - 82)  RR: 18 (14 Feb 2019 16:00) (15 - 32)  SpO2: 94% (14 Feb 2019 16:08) (92% - 100%)    General: chronically ill. Resting comfortably. No acute distress.   HEENT: mucous membrane moist.   Lungs: Diminished BS at b/l bases. O2 via NC. Nonlabored  CV: +s1/s2. Regular rate and rhythm.    GI:+ bowel sound. abdomen soft, non-tender  MSK: Moves all 4 extremities.  No cyanosis or edema. +weakness.   Neuro: awake and alert, oriented x4.    Skin: warm and dry      LABS:                          10.1   7.9   )-----------( 260      ( 14 Feb 2019 03:56 )             29.6   02-14    139  |  104  |  55.0<H>  ----------------------------<  115  3.6   |  21.0<L>  |  1.68<H>    Ca    9.2      14 Feb 2019 03:56  Phos  3.5     02-14  Mg     2.3     02-14    TPro  6.5<L>  /  Alb  3.1<L>  /  TBili  1.7  /  DBili  x   /  AST  41<H>  /  ALT  40<H>  /  AlkPhos  227<H>  02-12    I&O's Summary    13 Feb 2019 07:01  -  14 Feb 2019 07:00  --------------------------------------------------------  IN: 1502.4 mL / OUT: 1500 mL / NET: 2.4 mL    14 Feb 2019 07:01  -  14 Feb 2019 17:05  --------------------------------------------------------  IN: 626.8 mL / OUT: 200 mL / NET: 426.8 mL      RADIOLOGY & ADDITIONAL STUDIES: reviewed, no new studies    ADVANCE DIRECTIVES:   DNR YES NO  Completed on:                     MOLST  YES NO   Completed on:  Living Will  YES NO   Completed on:

## 2019-02-14 NOTE — PROGRESS NOTE ADULT - PROBLEM SELECTOR PLAN 1
Ischemic cardiomyopathy with MR  Milrinone infusion decreased back to 0.2, transition lasix to PO  net negative fluid balance
Ischemic cardiomyopathy with MR  Milrinone infusion increased, IV lasix will give additional dose this afternoon   as d/w Dr. Long- he proposed possible use of IABP to assist in supporting pt to pull her out of heart failure but not sure this is in line with pt care goals/directives  will D/C heparin infusion as it was for ACS - pt stented on 2/8, no plan for further cath interventions
Ischemic cardiomyopathy with MR  Milrinone infusion, IV lasix.  Will eventually need to start ACE and beta blocker.

## 2019-02-14 NOTE — PROGRESS NOTE ADULT - PROBLEM SELECTOR PLAN 5
PO synthroid 225 mcg.
PO synthroid 225 mcg. may benefit from repeat thyroid function panel in the coming weeks
PO synthroid 225 mcg.

## 2019-02-14 NOTE — PROGRESS NOTE ADULT - PROBLEM SELECTOR PROBLEM 4
Lung cancer metastatic to bone
Acute respiratory failure with hypoxia

## 2019-02-14 NOTE — PROGRESS NOTE ADULT - PROBLEM SELECTOR PROBLEM 2
NSTEMI (non-ST elevated myocardial infarction)
NSTEMI (non-ST elevated myocardial infarction)
Acute respiratory failure with hypoxia
NSTEMI (non-ST elevated myocardial infarction)

## 2019-02-14 NOTE — PROGRESS NOTE ADULT - ASSESSMENT
72 yo female with hx of metastatic lung cancer s/p radiation and immunotherapy (Opdivo) complicated by pneumonitis, COPD, CAD, CHFrEF, admitted initially on 2/8 with back pain and dyspnea, found to have NSTEMI and underwent LHC with OSMAR placement in LCA.  She was treated for acute CHF exacerbation/pulmonary edema with bipap and diuretics.    Transferred back to ICU on 2/12 due to worsening respiratory failure again requiring bipap, also started on inotrope.

## 2019-02-14 NOTE — PROGRESS NOTE ADULT - PROBLEM SELECTOR PROBLEM 1
Acute systolic congestive heart failure
NSTEMI (non-ST elevated myocardial infarction)

## 2019-02-14 NOTE — PROGRESS NOTE ADULT - SUBJECTIVE AND OBJECTIVE BOX
HEALTH ISSUES - PROBLEM Dx:        INTERVAL HPI/OVERNIGHT EVENTS:          REVIEW OF SYSTEMS:    CONSTITUTIONAL: No fever, weight loss, or fatigue  EYES: No eye pain, visual disturbances, or discharge  ENMT:  No difficulty hearing, tinnitus, vertigo; No sinus or throat pain  NECK: No pain or stiffness  BREASTS: No pain, masses, or nipple discharge  RESPIRATORY: No cough, wheezing, chills or hemoptysis; No shortness of breath  CARDIOVASCULAR: No chest pain, palpitations, dizziness, or leg swelling  GASTROINTESTINAL: No abdominal or epigastric pain. No nausea, vomiting, or hematemesis; No diarrhea or constipation. No melena or hematochezia.  GENITOURINARY: No dysuria, frequency, hematuria, or incontinence  NEUROLOGICAL: No headaches, memory loss, loss of strength, numbness, or tremors  SKIN: No itching, burning, rashes, or lesions   LYMPH NODES: No enlarged glands  ENDOCRINE: No heat or cold intolerance; No hair loss  MUSCULOSKELETAL: No joint pain or swelling; No muscle, back, or extremity pain  PSYCHIATRIC: No depression, anxiety, mood swings, or difficulty sleeping  HEME/LYMPH: No easy bruising, or bleeding gums  ALLERY AND IMMUNOLOGIC: No hives or eczema    Vital Signs Last 24 Hrs  T(C): 36.8 (14 Feb 2019 14:00), Max: 37 (13 Feb 2019 21:00)  T(F): 98.2 (14 Feb 2019 14:00), Max: 98.6 (13 Feb 2019 21:00)  HR: 70 (14 Feb 2019 16:08) (67 - 87)  BP: 114/61 (14 Feb 2019 16:00) (78/41 - 114/61)  BP(mean): 82 (14 Feb 2019 16:00) (52 - 82)  RR: 18 (14 Feb 2019 16:00) (15 - 32)  SpO2: 94% (14 Feb 2019 16:08) (92% - 100%)    PHYSICAL EXAM-  GENERAL: NAD, well-groomed, well-developed  HEAD:  Atraumatic, Normocephalic  EYES: EOMI, PERRLA, conjunctiva and sclera clear  ENMT: No tonsillar erythema, exudates, or enlargement; Moist mucous membranes, Good dentition, No lesions  NECK: Supple, No JVD, Normal thyroid  NERVOUS SYSTEM:  Alert & Oriented X3, Motor Strength 5/5 B/L upper and lower extremities; DTRs 2+ intact and symmetric  CHEST/LUNG: Clear to percussion bilaterally; No rales, rhonchi, wheezing, or rubs  HEART: Regular rate and rhythm; No murmurs, rubs, or gallops  ABDOMEN: Soft, Nontender, Nondistended; Bowel sounds present  EXTREMITIES:  2+ Peripheral Pulses, No clubbing, cyanosis, or edema  LYMPH: No lymphadenopathy noted  SKIN: No rashes or lesions    LABS:                        10.1   7.9   )-----------( 260      ( 14 Feb 2019 03:56 )             29.6     02-14    139  |  104  |  55.0<H>  ----------------------------<  115  3.6   |  21.0<L>  |  1.68<H>    Ca    9.2      14 Feb 2019 03:56  Phos  3.5     02-14  Mg     2.3     02-14    TPro  6.5<L>  /  Alb  3.1<L>  /  TBili  1.7  /  DBili  x   /  AST  41<H>  /  ALT  40<H>  /  AlkPhos  227<H>  02-12    PT/INR - ( 12 Feb 2019 21:57 )   PT: 14.0 sec;   INR: 1.21 ratio         PTT - ( 13 Feb 2019 05:22 )  PTT:48.8 sec        Assessment and Plan    Encephalopathy  Malnutrition  Morbid Obesity  Functional quadriplegia    DVT Prophylaxis    Discussed with: Patient, family, RN, CM, Consultants  Plan of care/ Discharge planning discussed.    Visit Time: MICU DOWNGRADE NOTE    Mrs. Cam is a 73 year old female with metastatic cancer (treated in past with chemo/RT), now with stage 4 with mets to the iliac bone, colon, and lung   HTN, CAD, HFrEF, ES COPD on home O2, HLD, hypothyroidism, metastatic Lung CA (received chemo and XRT), former smoker, admitted for back pain and found to have NSTEMI. S/P cardiac cath on 2/8 with occ of prox LCX and  of RCA s/p thrombectomy with stent. Course complicated by acute decompensated heart failure s/p diuretics and acute respiratory failure requiring bipap. RRT 2/12 again for acute respiratory failure requiring bipap support and transferred to ICU level of care. Milrinone and diuresis started. Noted EDER vs JOAQUIN. MOLST completed by MICU team with patient reflecting DNR/DNI    HEALTH ISSUES - PROBLEM Dx:    CC- CHF, resp failure    INTERVAL HPI/ OVERNIGHT EVENTS:    comfortable on [place] O2    REVIEW OF SYSTEMS:    sob + O2 + cp - edema + fever -    Vital Signs Last 24 Hrs  T(C): 36.8 (14 Feb 2019 14:00), Max: 37 (13 Feb 2019 21:00)  T(F): 98.2 (14 Feb 2019 14:00), Max: 98.6 (13 Feb 2019 21:00)  HR: 70 (14 Feb 2019 16:08) (67 - 87)  BP: 114/61 (14 Feb 2019 16:00) (78/41 - 114/61)  BP(mean): 82 (14 Feb 2019 16:00) (52 - 82)  RR: 18 (14 Feb 2019 16:00) (15 - 32)  SpO2: 94% (14 Feb 2019 16:08) (92% - 100%)    PHYSICAL EXAM-  General: Appears well developed, well nourished alert and cooperative. obese elderly afebrile ill appearing F  HEENT: Head; normocephalic, atraumatic.  Eyes;   Pupils reactive, cornea wnl.  Neck; Supple, no nodes adenopathy, no NVD or carotid bruit or thyromegaly.  CARDIOVASCULAR; No murmur, rub, gallop or lift. Normal S1 and S2.  LUNGS; No rales, rhonchi or wheeze. Normal breath sounds bilaterally.  ABDOMEN ; Soft, nontender without mass or organomegaly. bowel sounds normoactive. obese  EXTREMITIES; No clubbing, cyanosis or edema. ROM normal. groin w/o hematoma or pain.  SKIN; warm and dry with normal turgor.    LABS:                        10.1   7.9   )-----------( 260      ( 14 Feb 2019 03:56 )             29.6     02-14    139  |  104  |  55.0<H>  ----------------------------<  115  3.6   |  21.0<L>  |  1.68<H>    Ca    9.2      14 Feb 2019 03:56  Phos  3.5     02-14  Mg     2.3     02-14    TPro  6.5<L>  /  Alb  3.1<L>  /  TBili  1.7  /  DBili  x   /  AST  41<H>  /  ALT  40<H>  /  AlkPhos  227<H>  02-12    PT/INR - ( 12 Feb 2019 21:57 )   PT: 14.0 sec;   INR: 1.21 ratio       PTT - ( 13 Feb 2019 05:22 )  PTT:48.8 sec    Assessment and Plan

## 2019-02-15 LAB
ANION GAP SERPL CALC-SCNC: 14 MMOL/L — SIGNIFICANT CHANGE UP (ref 5–17)
BASOPHILS # BLD AUTO: 0 K/UL — SIGNIFICANT CHANGE UP (ref 0–0.2)
BASOPHILS NFR BLD AUTO: 0.4 % — SIGNIFICANT CHANGE UP (ref 0–2)
BUN SERPL-MCNC: 45 MG/DL — HIGH (ref 8–20)
CALCIUM SERPL-MCNC: 9.5 MG/DL — SIGNIFICANT CHANGE UP (ref 8.6–10.2)
CHLORIDE SERPL-SCNC: 102 MMOL/L — SIGNIFICANT CHANGE UP (ref 98–107)
CO2 SERPL-SCNC: 24 MMOL/L — SIGNIFICANT CHANGE UP (ref 22–29)
CREAT SERPL-MCNC: 1.51 MG/DL — HIGH (ref 0.5–1.3)
EOSINOPHIL # BLD AUTO: 0.2 K/UL — SIGNIFICANT CHANGE UP (ref 0–0.5)
EOSINOPHIL NFR BLD AUTO: 3.4 % — SIGNIFICANT CHANGE UP (ref 0–6)
GLUCOSE SERPL-MCNC: 100 MG/DL — SIGNIFICANT CHANGE UP (ref 70–115)
HCT VFR BLD CALC: 31.6 % — LOW (ref 37–47)
HGB BLD-MCNC: 10.1 G/DL — LOW (ref 12–16)
LYMPHOCYTES # BLD AUTO: 1.1 K/UL — SIGNIFICANT CHANGE UP (ref 1–4.8)
LYMPHOCYTES # BLD AUTO: 16.2 % — LOW (ref 20–55)
MAGNESIUM SERPL-MCNC: 2.4 MG/DL — SIGNIFICANT CHANGE UP (ref 1.6–2.6)
MCHC RBC-ENTMCNC: 28.5 PG — SIGNIFICANT CHANGE UP (ref 27–31)
MCHC RBC-ENTMCNC: 32 G/DL — SIGNIFICANT CHANGE UP (ref 32–36)
MCV RBC AUTO: 89.3 FL — SIGNIFICANT CHANGE UP (ref 81–99)
MONOCYTES # BLD AUTO: 0.6 K/UL — SIGNIFICANT CHANGE UP (ref 0–0.8)
MONOCYTES NFR BLD AUTO: 9.2 % — SIGNIFICANT CHANGE UP (ref 3–10)
NEUTROPHILS # BLD AUTO: 4.9 K/UL — SIGNIFICANT CHANGE UP (ref 1.8–8)
NEUTROPHILS NFR BLD AUTO: 69.8 % — SIGNIFICANT CHANGE UP (ref 37–73)
PHOSPHATE SERPL-MCNC: 3.8 MG/DL — SIGNIFICANT CHANGE UP (ref 2.4–4.7)
PLAT MORPH BLD: NORMAL — SIGNIFICANT CHANGE UP
PLATELET # BLD AUTO: 297 K/UL — SIGNIFICANT CHANGE UP (ref 150–400)
POTASSIUM SERPL-MCNC: 4.5 MMOL/L — SIGNIFICANT CHANGE UP (ref 3.5–5.3)
POTASSIUM SERPL-SCNC: 4.5 MMOL/L — SIGNIFICANT CHANGE UP (ref 3.5–5.3)
RBC # BLD: 3.54 M/UL — LOW (ref 4.4–5.2)
RBC # FLD: 15.1 % — SIGNIFICANT CHANGE UP (ref 11–15.6)
RBC BLD AUTO: NORMAL — SIGNIFICANT CHANGE UP
SODIUM SERPL-SCNC: 140 MMOL/L — SIGNIFICANT CHANGE UP (ref 135–145)
WBC # BLD: 7 K/UL — SIGNIFICANT CHANGE UP (ref 4.8–10.8)
WBC # FLD AUTO: 7 K/UL — SIGNIFICANT CHANGE UP (ref 4.8–10.8)

## 2019-02-15 PROCEDURE — 99232 SBSQ HOSP IP/OBS MODERATE 35: CPT

## 2019-02-15 PROCEDURE — 99356: CPT

## 2019-02-15 RX ORDER — LEVOTHYROXINE SODIUM 125 MCG
0 TABLET ORAL
Qty: 0 | Refills: 0 | COMMUNITY

## 2019-02-15 RX ORDER — METOPROLOL TARTRATE 50 MG
25 TABLET ORAL
Qty: 0 | Refills: 0 | Status: DISCONTINUED | OUTPATIENT
Start: 2019-02-15 | End: 2019-02-17

## 2019-02-15 RX ORDER — BUPROPION HYDROCHLORIDE 150 MG/1
1 TABLET, EXTENDED RELEASE ORAL
Qty: 0 | Refills: 0 | COMMUNITY

## 2019-02-15 RX ORDER — ISOSORBIDE MONONITRATE 60 MG/1
30 TABLET, EXTENDED RELEASE ORAL DAILY
Qty: 0 | Refills: 0 | Status: DISCONTINUED | OUTPATIENT
Start: 2019-02-15 | End: 2019-02-17

## 2019-02-15 RX ORDER — HYDRALAZINE HCL 50 MG
10 TABLET ORAL EVERY 8 HOURS
Qty: 0 | Refills: 0 | Status: DISCONTINUED | OUTPATIENT
Start: 2019-02-15 | End: 2019-02-17

## 2019-02-15 RX ADMIN — Medication 1 APPLICATION(S): at 05:53

## 2019-02-15 RX ADMIN — Medication 3 MILLILITER(S): at 04:08

## 2019-02-15 RX ADMIN — Medication 25 MILLIGRAM(S): at 13:30

## 2019-02-15 RX ADMIN — HEPARIN SODIUM 5000 UNIT(S): 5000 INJECTION INTRAVENOUS; SUBCUTANEOUS at 05:56

## 2019-02-15 RX ADMIN — Medication 40 MILLIGRAM(S): at 17:36

## 2019-02-15 RX ADMIN — Medication 600 MILLIGRAM(S): at 21:06

## 2019-02-15 RX ADMIN — Medication 1 APPLICATION(S): at 21:07

## 2019-02-15 RX ADMIN — Medication 10 MILLIGRAM(S): at 13:30

## 2019-02-15 RX ADMIN — Medication 25 MILLIGRAM(S): at 05:42

## 2019-02-15 RX ADMIN — Medication 1 APPLICATION(S): at 13:31

## 2019-02-15 RX ADMIN — PANTOPRAZOLE SODIUM 40 MILLIGRAM(S): 20 TABLET, DELAYED RELEASE ORAL at 05:43

## 2019-02-15 RX ADMIN — Medication 100 MILLIGRAM(S): at 05:43

## 2019-02-15 RX ADMIN — Medication 81 MILLIGRAM(S): at 11:07

## 2019-02-15 RX ADMIN — ISOSORBIDE MONONITRATE 30 MILLIGRAM(S): 60 TABLET, EXTENDED RELEASE ORAL at 13:30

## 2019-02-15 RX ADMIN — Medication 100 MILLIGRAM(S): at 17:35

## 2019-02-15 RX ADMIN — Medication 40 MILLIGRAM(S): at 05:43

## 2019-02-15 RX ADMIN — TICAGRELOR 90 MILLIGRAM(S): 90 TABLET ORAL at 05:56

## 2019-02-15 RX ADMIN — Medication 225 MICROGRAM(S): at 05:43

## 2019-02-15 RX ADMIN — ATORVASTATIN CALCIUM 40 MILLIGRAM(S): 80 TABLET, FILM COATED ORAL at 21:06

## 2019-02-15 RX ADMIN — TICAGRELOR 90 MILLIGRAM(S): 90 TABLET ORAL at 17:35

## 2019-02-15 RX ADMIN — Medication 3 MILLILITER(S): at 08:52

## 2019-02-15 RX ADMIN — HEPARIN SODIUM 5000 UNIT(S): 5000 INJECTION INTRAVENOUS; SUBCUTANEOUS at 21:06

## 2019-02-15 RX ADMIN — Medication 3 MILLILITER(S): at 20:27

## 2019-02-15 RX ADMIN — Medication 10 MILLIGRAM(S): at 21:06

## 2019-02-15 RX ADMIN — Medication 25 MILLIGRAM(S): at 21:05

## 2019-02-15 RX ADMIN — Medication 5 MILLIGRAM(S): at 05:43

## 2019-02-15 RX ADMIN — Medication 25 MILLIGRAM(S): at 17:35

## 2019-02-15 RX ADMIN — HEPARIN SODIUM 5000 UNIT(S): 5000 INJECTION INTRAVENOUS; SUBCUTANEOUS at 13:30

## 2019-02-15 RX ADMIN — Medication 600 MILLIGRAM(S): at 11:07

## 2019-02-15 RX ADMIN — Medication 3 MILLILITER(S): at 15:35

## 2019-02-15 NOTE — PROGRESS NOTE ADULT - ASSESSMENT
Assessment  Acute systolic HF HFrEF  s/p NSTEMI  s/p PCI CX with total RCA EF 30%  EDER post cath with CHF  mod to severe MR   O2 dep COPD  Met lung cancer      Rec  DC milrinone  avoid ACE ARB or entresto given EDER  add low dose lopressor, imdur and hydralazine'  cont lasix  cont DAPT  reand BNP  repeat echo mon to assess EF  possibly DC monday  start PT

## 2019-02-15 NOTE — PROGRESS NOTE ADULT - ASSESSMENT
74 yo female, PMHX HTN, HLD, hypothyroidism, metastatic Lung CA (received chemo and XRT), former smoker, presented with back pain found to have NSTEMI s/p PCI 1DES to Cx, acute hypoxemic respiratory failure secondary to acute pulmonary edema, acute systolic heart failure.     Cpurse-  Mrs. Cam is a 73 year old female with metastatic cancer (treated in past with chemo/RT), now with stage 4 with mets to the iliac bone, colon, and lung   HTN, CAD, HFrEF, ES COPD on home O2, HLD, hypothyroidism, metastatic Lung CA (received chemo and XRT), former smoker, admitted for back pain and found to have NSTEMI. S/P cardiac cath on 2/8 with occ of prox LCX and  of RCA s/p thrombectomy with stent. Course complicated by acute decompensated heart failure s/p diuretics and acute respiratory failure requiring bipap. RRT 2/12 again for acute respiratory failure requiring bipap support and transferred to ICU level of care. Milrinone and diuresis started. weaned to NC O2 and downgraded to floor. Milrinone was continued x 1 day on the floor and d/rell on 2/14. Plan to rpt ECHO on Monday per Cardiology.       Acute hypoxemic respiratory failure 2/2 pulmonary edema 2/2 Acute systolic heart failure from ICM  treated in MICU with NIPPV  milrinone and diuresis  Milrinone for 1 more day per cardiology  Prednisone maintainence home dose for chr resp failure  Hi pres[place] O2  No ACEi/ Entresto sec to renal failure    EDER on CKD stg 2  - Cath on 8th and Eder on 10th  - JOAQUIN vs EDER- sec to CHF/ Medications  - Crt stabilizing now  - Avoid nephrotoxics as possible and renally dose meds  - Monitor    NSTEMI  s/p recent Cx PCI with total RCA  Continue telemetry   Continue Brilinta 90 mg bid  Aspirin 81mg   Atorvastatin 40mg     Elevated LFTs  - congestive hepatopathy, sec to CHF  - Monitor for resolution  - Cont statins    COPD  ABG with no resp acidosis  At home- On home O2 PRN only  Duoneb   Prednisone 5mg daily  weaned off from Hi presley to NC O2    Metastatic Lung CA   Continue pain medication   MOLST signed and pt is DNR/DNI     HTN   Continue Nifedipine XL 90mg     Synthroid   Pt compliant with Synthroid 200mcg at home, TSH 12, increased dose to 225 mcg, check TSH as outpt    Tobacco use   Nicotine patch removed as not smoked since a few months & no signs of withdrawal    Supportive   DVT prophylaxis: SCD  GI prophylaxis: PPI 40mg   Diet: DASH     Medication list obtained from her pharmacy, as pt did not have it with her. Med list updated and reconciled.

## 2019-02-15 NOTE — PROGRESS NOTE ADULT - ATTENDING COMMENTS
D/W pt and her  CHELLE Armendariz
> Goals of care - I had a lengthy conversation with pt & her daughter.  We discussed the comorbid conditions, hospital care, and extremely guarded prognosis. Pt & her family opting for DNR/DNI.  Total time spent on patient care discussion & coordinating care = 60 minutes.
D/W RN, MICU team Dr. Lloyd/WILNER Nuñez, patient and pt's 
D/W pt and her  CHELLE Armendariz
pt DNR/I,  updated as to plan of care at bedside, care d/w Palliative and Cardiology teams. check lytes at 3pm today
pt is stable to transfer to telemetry,  updated as to plan of care at bedside, care d/w cardiology

## 2019-02-15 NOTE — PROGRESS NOTE ADULT - SUBJECTIVE AND OBJECTIVE BOX
[facility] - [facility]                                   [address] [place] [address]                                                      PHONE: [phone]                                                         FAX: [phone]  [email or web address]  ---------------------------------------------------------------------------------------------------------------------------------    Overnight events/patient complaints: improved on nasal cannula, no CP      No Known Allergies    MEDICATIONS  (STANDING):  ALBUTerol    90 MICROgram(s) HFA Inhaler 1 Puff(s) Inhalation every 4 hours  ALBUTerol/ipratropium for Nebulization 3 milliLiter(s) Nebulizer every 6 hours  aspirin  chewable 81 milliGRAM(s) Oral daily  atorvastatin 40 milliGRAM(s) Oral at bedtime  docusate sodium 100 milliGRAM(s) Oral two times a day  furosemide    Tablet 40 milliGRAM(s) Oral two times a day  guaiFENesin  milliGRAM(s) Oral every 12 hours  heparin  Injectable 5000 Unit(s) SubCutaneous every 8 hours  influenza   Vaccine 0.5 milliLiter(s) IntraMuscular once  levothyroxine 225 MICROGram(s) Oral daily  lidocaine   Patch 1 Patch Transdermal daily  milrinone Infusion 0.2 MICROgram(s)/kG/Min (5.172 mL/Hr) IV Continuous <Continuous>  pantoprazole    Tablet 40 milliGRAM(s) Oral before breakfast  predniSONE   Tablet 5 milliGRAM(s) Oral daily  pregabalin 25 milliGRAM(s) Oral every 8 hours  ticagrelor 90 milliGRAM(s) Oral two times a day  tiotropium 18 MICROgram(s) Capsule 1 Capsule(s) Inhalation daily  vitamin A &amp; D Ointment 1 Application(s) Topical three times a day    MEDICATIONS  (PRN):  ALPRAZolam 0.25 milliGRAM(s) Oral three times a day PRN anxiety  bisacodyl Suppository 10 milliGRAM(s) Rectal daily PRN Constipation  cyclobenzaprine 10 milliGRAM(s) Oral three times a day PRN Muscle Spasm  ondansetron Injectable 4 milliGRAM(s) IV Push every 8 hours PRN Nausea and/or Vomiting  oxyCODONE    5 mG/acetaminophen 325 mG 1 Tablet(s) Oral every 6 hours PRN Severe Pain (7 - 10)  senna 2 Tablet(s) Oral at bedtime PRN Constipation      Vital Signs Last 24 Hrs  T(C): 37 (15 Feb 2019 09:44), Max: 37 (15 Feb 2019 09:44)  T(F): 98.6 (15 Feb 2019 09:44), Max: 98.6 (15 Feb 2019 09:44)  HR: 72 (15 Feb 2019 09:44) (70 - 83)  BP: 113/70 (15 Feb 2019 09:44) (103/60 - 121/66)  BP(mean): 78 (2019 17:00) (71 - 82)  RR: 15 (15 Feb 2019 09:44) (15 - 23)  SpO2: 94% (15 Feb 2019 09:44) (93% - 98%)  Daily     Daily Weight in k.3 (15 Feb 2019 05:19)  ICU Vital Signs Last 24 Hrs  JOEY CHRISTOPHER  I&O's Detail    2019 07:  -  15 Feb 2019 07:00  --------------------------------------------------------  IN:    IV PiggyBack: 100 mL    milrinone  Infusion: 52 mL    Oral Fluid: 480 mL  Total IN: 632 mL    OUT:    Voided: 200 mL  Total OUT: 200 mL    Total NET: 432 mL        I&O's Summary    2019 07:01  -  15 Feb 2019 07:00  --------------------------------------------------------  IN: 632 mL / OUT: 200 mL / NET: 432 mL      Drug Dosing Weight  JOEY CHRISTOPHER      PHYSICAL EXAM:  General: Appears well developed, well nourished alert and cooperative.  HEENT: Head; normocephalic, atraumatic.  Eyes: Pupils reactive, cornea wnl.  Neck: Supple, no nodes adenopathy, no NVD or carotid bruit or thyromegaly.  CARDIOVASCULAR: Normal S1 and S2, No murmur, rub, gallop or lift.   LUNGS: No rales, rhonchi or wheeze. Normal breath sounds bilaterally.  ABDOMEN: Soft, nontender without mass or organomegaly. bowel sounds normoactive.  EXTREMITIES: No clubbing, cyanosis or edema. Distal pulses wnl.   SKIN: warm and dry with normal turgor.  NEURO: Alert/oriented x 3/normal motor exam. No pathologic reflexes.    PSYCH: normal affect.        LABS:                        10.1   7.0   )-----------( 297      ( 15 Feb 2019 06:30 )             31.6     02-15    140  |  102  |  45.0<H>  ----------------------------<  100  4.5   |  24.0  |  1.51<H>    Ca    9.5      15 Feb 2019 06:30  Phos  3.8     -15  Mg     2.4     -15      JOEY CHRISTOPHER            RADIOLOGY & ADDITIONAL STUDIES:    INTERPRETATION OF TELEMETRY (personally reviewed):    ECG:    ECHO:< from: TTE Echo Complete w/Doppler (19 @ 09:05) >    Summary:   1. Left ventricular ejection fraction, by visual estimation, is 25 to   30%.   2. Technically difficult study.   3. Multiple left ventricular regional wall motion abnormalities exist.   See wall motion findings.   4. Small pericardial effusion.    [id number] Antony Hoyos MD, Electronically signed on 2019 at 3:39:35 PM         *** Final ***            < end of copied text >

## 2019-02-15 NOTE — PROGRESS NOTE ADULT - SUBJECTIVE AND OBJECTIVE BOX
FOLLOW UP VISIT    INTERVAL HPI/OVERNIGHT EVENTS:  Seen and examined at bedside.  present.   Resting comfortably OOB to chair. Breathing comfortably on O2 via NC 3L  She reports doing well and offered no acute complaints.     Present Symptoms:     Dyspnea: comfortable on O2 via NC    Nausea/Vomiting: No  Anxiety:  intermittent  Fatigue: Yes   Insomnia: No  Pain: No    Review of Systems: Reviewed, All others negative    MEDICATIONS  (STANDING):  ALBUTerol    90 MICROgram(s) HFA Inhaler 1 Puff(s) Inhalation every 4 hours  ALBUTerol/ipratropium for Nebulization 3 milliLiter(s) Nebulizer every 6 hours  aspirin  chewable 81 milliGRAM(s) Oral daily  atorvastatin 40 milliGRAM(s) Oral at bedtime  docusate sodium 100 milliGRAM(s) Oral two times a day  furosemide    Tablet 40 milliGRAM(s) Oral two times a day  guaiFENesin  milliGRAM(s) Oral every 12 hours  heparin  Injectable 5000 Unit(s) SubCutaneous every 8 hours  hydrALAZINE 10 milliGRAM(s) Oral every 8 hours  influenza   Vaccine 0.5 milliLiter(s) IntraMuscular once  isosorbide   mononitrate ER Tablet (IMDUR) 30 milliGRAM(s) Oral daily  levothyroxine 225 MICROGram(s) Oral daily  lidocaine   Patch 1 Patch Transdermal daily  metoprolol tartrate 25 milliGRAM(s) Oral two times a day  pantoprazole    Tablet 40 milliGRAM(s) Oral before breakfast  predniSONE   Tablet 5 milliGRAM(s) Oral daily  pregabalin 25 milliGRAM(s) Oral every 8 hours  ticagrelor 90 milliGRAM(s) Oral two times a day  tiotropium 18 MICROgram(s) Capsule 1 Capsule(s) Inhalation daily  vitamin A &amp; D Ointment 1 Application(s) Topical three times a day    MEDICATIONS  (PRN):  ALPRAZolam 0.25 milliGRAM(s) Oral three times a day PRN anxiety  bisacodyl Suppository 10 milliGRAM(s) Rectal daily PRN Constipation  cyclobenzaprine 10 milliGRAM(s) Oral three times a day PRN Muscle Spasm  ondansetron Injectable 4 milliGRAM(s) IV Push every 8 hours PRN Nausea and/or Vomiting  oxyCODONE    5 mG/acetaminophen 325 mG 1 Tablet(s) Oral every 6 hours PRN Severe Pain (7 - 10)  senna 2 Tablet(s) Oral at bedtime PRN Constipation    PHYSICAL EXAM:  Vital Signs Last 24 Hrs  T(C): 37 (15 Feb 2019 09:44), Max: 37 (15 Feb 2019 09:44)  T(F): 98.6 (15 Feb 2019 09:44), Max: 98.6 (15 Feb 2019 09:44)  HR: 72 (15 Feb 2019 09:44) (70 - 83)  BP: 113/70 (15 Feb 2019 09:44) (103/60 - 121/66)  BP(mean): 78 (14 Feb 2019 17:00) (71 - 82)  RR: 15 (15 Feb 2019 09:44) (15 - 23)  SpO2: 94% (15 Feb 2019 09:44) (93% - 98%)    General: chronically ill. Resting comfortably. No acute distress.   HEENT: mucous membrane moist.   Lungs: Diminished BS at b/l bases. O2 via NC. Nonlabored  CV: +s1/s2. Regular rate and rhythm.    GI:+ bowel sound. abdomen soft, non-tender  MSK: Moves all 4 extremities.  No cyanosis or edema. +weakness.   Neuro: awake and alert, oriented x4.    Skin: warm and dry. ecchymoses of BUE    LABS:                        10.1   7.0   )-----------( 297      ( 15 Feb 2019 06:30 )             31.6   02-15    140  |  102  |  45.0<H>  ----------------------------<  100  4.5   |  24.0  |  1.51<H>    Ca    9.5      15 Feb 2019 06:30  Phos  3.8     02-15  Mg     2.4     02-15    I&O's Summary    14 Feb 2019 07:01  -  15 Feb 2019 07:00  --------------------------------------------------------  IN: 632 mL / OUT: 200 mL / NET: 432 mL    15 Feb 2019 07:01  -  15 Feb 2019 11:08  --------------------------------------------------------  IN: 435.6 mL / OUT: 600 mL / NET: -164.4 mL    RADIOLOGY & ADDITIONAL STUDIES: reviewed, no new studies    ADVANCE DIRECTIVES:   DNR YES NO  Completed on:                     YOUSIF  YES NO   Completed on:  Living Will  YES NO   Completed on:

## 2019-02-15 NOTE — PROGRESS NOTE ADULT - SUBJECTIVE AND OBJECTIVE BOX
HEALTH ISSUES - PROBLEM Dx:    CC- CHF, resp failure    INTERVAL HPI/ OVERNIGHT EVENTS:    comfortable  got herself to chair today  hasnt ambulated    REVIEW OF SYSTEMS:    sob - O2 + cp - edema + fever -    Vital Signs Last 24 Hrs  T(C): 36.8 (15 Feb 2019 16:10), Max: 37 (15 Feb 2019 09:44)  T(F): 98.2 (15 Feb 2019 16:10), Max: 98.6 (15 Feb 2019 09:44)  HR: 70 (15 Feb 2019 17:34) (70 - 83)  BP: 118/66 (15 Feb 2019 17:34) (103/60 - 121/66)  BP(mean): --  RR: 18 (15 Feb 2019 16:10) (15 - 18)  SpO2: 94% (15 Feb 2019 16:10) (93% - 96%)    PHYSICAL EXAM-  General: Appears well developed, well nourished alert and cooperative. obese elderly afebrile ill appearing F  HEENT: Head; normocephalic, atraumatic.  Eyes;   Pupils reactive, cornea wnl.  Neck; Supple, no nodes adenopathy, no NVD or carotid bruit or thyromegaly.  CARDIOVASCULAR; No murmur, rub, gallop or lift. Normal S1 and S2.  LUNGS; No rales, rhonchi or wheeze. Normal breath sounds bilaterally.  ABDOMEN ; Soft, nontender without mass or organomegaly. bowel sounds normoactive. obese  EXTREMITIES; No clubbing, cyanosis, trace edema on rt leg > left leg. ROM normal. groin w/o hematoma or pain.  SKIN; warm and dry with normal turgor.    LABS:                        10.1   7.0   )-----------( 297      ( 15 Feb 2019 06:30 )             31.6     02-15    140  |  102  |  45.0<H>  ----------------------------<  100  4.5   |  24.0  |  1.51<H>    Ca    9.5      15 Feb 2019 06:30  Phos  3.8     02-15  Mg     2.4     02-15    Assessment and Plan

## 2019-02-15 NOTE — PROGRESS NOTE ADULT - ASSESSMENT
Mrs. Cam is a 73 year old female with metastatic cancer (treated in past with chemo/RT), HTN, CAD, HFrEF, ES COPD on home O2 admitted for back pain and found to have NSTEMI. S/P cardiac cath on 2/8 with occ of prox LCX and  of RCA s/p thrombectomy with stent. Course complicated by acute decompensated heart failure s/p diuretics and acute respiratory failure requiring bipap. RRT 2/12 again for acute respiratory failure requiring bipap support and transferred to ICU level of care. GOC discussion initiated by MICU and primary team earlier today, code status addressed and pt/family opted for DNR/DNI, Palliative care following for support and ongoing goc. MOLST completed by MICU team with patient reflecting DNR/DNI.      PLAN:     Acute respiratory failure   ES COPD  Hx of Radiation Pneumonitis  Acute on Chronic Heart Failure   - TTE/Cath with LVEF 35-40%  - s/p RRT on 2/12 requring BIPAP; now on nasal cannula  - IV milrinone discontinued by primary team. c/w O2 supplement, diuretics, nebs  - Cardio following    NSTEMI  - s/p cardiac cath 2/8 with thrombectomy and OSMAR  - c/w DAPT  - plan per cardiology    EDER  - improving slowly  - etiology likely related to contrast induced from recent cath, ?JOAQUIN  - avoid nephrotoxic agents  - serial Cr/GFR    Palliative Care Encounter  Discussed with patient at bedside,  stepped away. Pt is alert, awake, oriented x4. She has appropriate medical decision making capacity. She share about the status of her metastatic lung cancer, treated with chemotherapy in 2016 followed by nivolumab with last dose in late 2018, told by her oncologist her disease is in remission based on last imaging and plan is to continue with surveillance and reimaging by oncology. She is aware her cardiac status is not great. She states "I am a fighter" and wants to continue all aggressive medical intervention at present time except for CPR/mechancical intubation - DNR/DNI confirmed earlier this admission. She hopes to go home when medically stable and follow up with her doctors. She was tearful during our conversation but expressed appreciation of my visit.     - A HCP/living will is in the chart  - HCP:  Marcello (1st) and alternate as daughter Kiah Fritz [phone].   - MOLST for DNR/DNI.  - Plan of care: continue all current medical management; pt wants to go home when medically stable. Mrs. Cam is a 73 year old female with metastatic cancer (treated in past with chemo/RT), HTN, CAD, HFrEF, ES COPD on home O2 admitted for back pain and found to have NSTEMI. S/P cardiac cath on 2/8 with occ of prox LCX and  of RCA s/p thrombectomy with stent. Course complicated by acute decompensated heart failure s/p diuretics and acute respiratory failure requiring bipap. RRT 2/12 again for acute respiratory failure requiring bipap support and transferred to ICU level of care. GOC discussion initiated by MICU and primary team earlier today, code status addressed and pt/family opted for DNR/DNI, Palliative care following for support and ongoing goc. MOLST completed by MICU team with patient reflecting DNR/DNI.      PLAN:     Acute respiratory failure   ES COPD  Hx of Radiation Pneumonitis  Acute on Chronic Heart Failure   - TTE/Cath with LVEF 35-40%  - s/p RRT on 2/12 requring BIPAP; now on nasal cannula  - IV milrinone discontinued by primary team. c/w O2 supplement, diuretics, nebs  - Cardio following    NSTEMI  - s/p cardiac cath 2/8 with thrombectomy and OSMAR  - c/w DAPT  - plan per cardiology    EDER  - improving slowly  - etiology likely related to contrast induced from recent cath, ?JOAQUIN  - avoid nephrotoxic agents  - serial Cr/GFR    Palliative Care Encounter  Multiple discussions with patient, please see most recent GOC note for further detail.   In summary:   - HCP/living will is in the chart  - HCP:  Marcello (1st) and alternate as daughter Kiah Fritz [phone].   - MOLST completed this admission for DNR/DNI.  - Plan of care: continue all current medical management; pt wants to go home when medically stable.

## 2019-02-16 ENCOUNTER — TRANSCRIPTION ENCOUNTER (OUTPATIENT)
Age: 74
End: 2019-02-16

## 2019-02-16 LAB
ALBUMIN SERPL ELPH-MCNC: 3.2 G/DL — LOW (ref 3.3–5.2)
ALP SERPL-CCNC: 151 U/L — HIGH (ref 40–120)
ALT FLD-CCNC: 24 U/L — SIGNIFICANT CHANGE UP
ANION GAP SERPL CALC-SCNC: 16 MMOL/L — SIGNIFICANT CHANGE UP (ref 5–17)
ANISOCYTOSIS BLD QL: SLIGHT — SIGNIFICANT CHANGE UP
AST SERPL-CCNC: 26 U/L — SIGNIFICANT CHANGE UP
BILIRUB SERPL-MCNC: 0.7 MG/DL — SIGNIFICANT CHANGE UP (ref 0.4–2)
BUN SERPL-MCNC: 45 MG/DL — HIGH (ref 8–20)
CALCIUM SERPL-MCNC: 9.9 MG/DL — SIGNIFICANT CHANGE UP (ref 8.6–10.2)
CHLORIDE SERPL-SCNC: 102 MMOL/L — SIGNIFICANT CHANGE UP (ref 98–107)
CO2 SERPL-SCNC: 22 MMOL/L — SIGNIFICANT CHANGE UP (ref 22–29)
CREAT SERPL-MCNC: 1.6 MG/DL — HIGH (ref 0.5–1.3)
EOSINOPHIL NFR BLD AUTO: 2 % — SIGNIFICANT CHANGE UP (ref 0–5)
GLUCOSE SERPL-MCNC: 115 MG/DL — SIGNIFICANT CHANGE UP (ref 70–115)
HCT VFR BLD CALC: 31.6 % — LOW (ref 37–47)
HGB BLD-MCNC: 10.4 G/DL — LOW (ref 12–16)
LYMPHOCYTES # BLD AUTO: 15 % — LOW (ref 20–55)
MACROCYTES BLD QL: SLIGHT — SIGNIFICANT CHANGE UP
MAGNESIUM SERPL-MCNC: 2.3 MG/DL — SIGNIFICANT CHANGE UP (ref 1.8–2.6)
MCHC RBC-ENTMCNC: 29.3 PG — SIGNIFICANT CHANGE UP (ref 27–31)
MCHC RBC-ENTMCNC: 32.9 G/DL — SIGNIFICANT CHANGE UP (ref 32–36)
MCV RBC AUTO: 89 FL — SIGNIFICANT CHANGE UP (ref 81–99)
MONOCYTES NFR BLD AUTO: 9 % — SIGNIFICANT CHANGE UP (ref 3–10)
NEUTROPHILS NFR BLD AUTO: 73 % — SIGNIFICANT CHANGE UP (ref 37–73)
NEUTS BAND # BLD: 1 % — SIGNIFICANT CHANGE UP (ref 0–8)
NT-PROBNP SERPL-SCNC: 5147 PG/ML — HIGH (ref 0–300)
PHOSPHATE SERPL-MCNC: 3.8 MG/DL — SIGNIFICANT CHANGE UP (ref 2.4–4.7)
PLAT MORPH BLD: NORMAL — SIGNIFICANT CHANGE UP
PLATELET # BLD AUTO: 308 K/UL — SIGNIFICANT CHANGE UP (ref 150–400)
POIKILOCYTOSIS BLD QL AUTO: SLIGHT — SIGNIFICANT CHANGE UP
POTASSIUM SERPL-MCNC: 4.8 MMOL/L — SIGNIFICANT CHANGE UP (ref 3.5–5.3)
POTASSIUM SERPL-SCNC: 4.8 MMOL/L — SIGNIFICANT CHANGE UP (ref 3.5–5.3)
PROT SERPL-MCNC: 7.5 G/DL — SIGNIFICANT CHANGE UP (ref 6.6–8.7)
RBC # BLD: 3.55 M/UL — LOW (ref 4.4–5.2)
RBC # FLD: 15.1 % — SIGNIFICANT CHANGE UP (ref 11–15.6)
RBC BLD AUTO: SIGNIFICANT CHANGE UP
SODIUM SERPL-SCNC: 140 MMOL/L — SIGNIFICANT CHANGE UP (ref 135–145)
WBC # BLD: 8 K/UL — SIGNIFICANT CHANGE UP (ref 4.8–10.8)
WBC # FLD AUTO: 8 K/UL — SIGNIFICANT CHANGE UP (ref 4.8–10.8)

## 2019-02-16 PROCEDURE — 99232 SBSQ HOSP IP/OBS MODERATE 35: CPT

## 2019-02-16 RX ORDER — ATORVASTATIN CALCIUM 80 MG/1
1 TABLET, FILM COATED ORAL
Qty: 30 | Refills: 0 | OUTPATIENT
Start: 2019-02-16

## 2019-02-16 RX ORDER — FUROSEMIDE 40 MG
1 TABLET ORAL
Qty: 30 | Refills: 0 | OUTPATIENT
Start: 2019-02-16

## 2019-02-16 RX ORDER — TICAGRELOR 90 MG/1
1 TABLET ORAL
Qty: 60 | Refills: 0 | OUTPATIENT
Start: 2019-02-16

## 2019-02-16 RX ORDER — NIFEDIPINE 30 MG
1 TABLET, EXTENDED RELEASE 24 HR ORAL
Qty: 0 | Refills: 0 | COMMUNITY

## 2019-02-16 RX ORDER — ASPIRIN/CALCIUM CARB/MAGNESIUM 324 MG
1 TABLET ORAL
Qty: 0 | Refills: 0 | DISCHARGE
Start: 2019-02-16

## 2019-02-16 RX ORDER — LEVOTHYROXINE SODIUM 125 MCG
0 TABLET ORAL
Qty: 0 | Refills: 0 | COMMUNITY

## 2019-02-16 RX ORDER — FUROSEMIDE 40 MG
20 TABLET ORAL
Qty: 0 | Refills: 0 | COMMUNITY

## 2019-02-16 RX ORDER — METOPROLOL TARTRATE 50 MG
1 TABLET ORAL
Qty: 60 | Refills: 0 | OUTPATIENT
Start: 2019-02-16

## 2019-02-16 RX ORDER — RAMELTEON 8 MG
1 TABLET ORAL
Qty: 0 | Refills: 0 | COMMUNITY

## 2019-02-16 RX ORDER — TIOTROPIUM BROMIDE 18 UG/1
1 CAPSULE ORAL; RESPIRATORY (INHALATION)
Qty: 1 | Refills: 0 | OUTPATIENT
Start: 2019-02-16

## 2019-02-16 RX ORDER — ERGOCALCIFEROL 1.25 MG/1
1 CAPSULE ORAL
Qty: 0 | Refills: 0 | COMMUNITY

## 2019-02-16 RX ORDER — LOSARTAN POTASSIUM 100 MG/1
40 TABLET, FILM COATED ORAL
Qty: 0 | Refills: 0 | COMMUNITY

## 2019-02-16 RX ORDER — ISOSORBIDE MONONITRATE 60 MG/1
1 TABLET, EXTENDED RELEASE ORAL
Qty: 30 | Refills: 0 | OUTPATIENT
Start: 2019-02-16

## 2019-02-16 RX ORDER — LEVOTHYROXINE SODIUM 125 MCG
3 TABLET ORAL
Qty: 90 | Refills: 0 | OUTPATIENT
Start: 2019-02-16

## 2019-02-16 RX ADMIN — TICAGRELOR 90 MILLIGRAM(S): 90 TABLET ORAL at 17:37

## 2019-02-16 RX ADMIN — Medication 10 MILLIGRAM(S): at 22:05

## 2019-02-16 RX ADMIN — PANTOPRAZOLE SODIUM 40 MILLIGRAM(S): 20 TABLET, DELAYED RELEASE ORAL at 06:18

## 2019-02-16 RX ADMIN — Medication 225 MICROGRAM(S): at 06:18

## 2019-02-16 RX ADMIN — Medication 81 MILLIGRAM(S): at 11:27

## 2019-02-16 RX ADMIN — HEPARIN SODIUM 5000 UNIT(S): 5000 INJECTION INTRAVENOUS; SUBCUTANEOUS at 06:18

## 2019-02-16 RX ADMIN — ATORVASTATIN CALCIUM 40 MILLIGRAM(S): 80 TABLET, FILM COATED ORAL at 22:05

## 2019-02-16 RX ADMIN — ISOSORBIDE MONONITRATE 30 MILLIGRAM(S): 60 TABLET, EXTENDED RELEASE ORAL at 11:27

## 2019-02-16 RX ADMIN — HEPARIN SODIUM 5000 UNIT(S): 5000 INJECTION INTRAVENOUS; SUBCUTANEOUS at 13:04

## 2019-02-16 RX ADMIN — HEPARIN SODIUM 5000 UNIT(S): 5000 INJECTION INTRAVENOUS; SUBCUTANEOUS at 22:05

## 2019-02-16 RX ADMIN — TICAGRELOR 90 MILLIGRAM(S): 90 TABLET ORAL at 06:17

## 2019-02-16 RX ADMIN — Medication 600 MILLIGRAM(S): at 22:05

## 2019-02-16 RX ADMIN — Medication 1 APPLICATION(S): at 13:05

## 2019-02-16 RX ADMIN — Medication 10 MILLIGRAM(S): at 06:18

## 2019-02-16 RX ADMIN — Medication 40 MILLIGRAM(S): at 06:18

## 2019-02-16 RX ADMIN — Medication 5 MILLIGRAM(S): at 06:18

## 2019-02-16 RX ADMIN — Medication 40 MILLIGRAM(S): at 17:41

## 2019-02-16 RX ADMIN — Medication 3 MILLILITER(S): at 15:09

## 2019-02-16 RX ADMIN — Medication 1 APPLICATION(S): at 06:19

## 2019-02-16 RX ADMIN — Medication 100 MILLIGRAM(S): at 06:17

## 2019-02-16 RX ADMIN — Medication 10 MILLIGRAM(S): at 13:05

## 2019-02-16 RX ADMIN — Medication 25 MILLIGRAM(S): at 06:18

## 2019-02-16 RX ADMIN — Medication 600 MILLIGRAM(S): at 11:28

## 2019-02-16 RX ADMIN — Medication 25 MILLIGRAM(S): at 17:37

## 2019-02-16 RX ADMIN — Medication 3 MILLILITER(S): at 20:54

## 2019-02-16 RX ADMIN — Medication 100 MILLIGRAM(S): at 17:38

## 2019-02-16 RX ADMIN — Medication 3 MILLILITER(S): at 09:52

## 2019-02-16 RX ADMIN — Medication 3 MILLILITER(S): at 03:40

## 2019-02-16 NOTE — DISCHARGE NOTE ADULT - PROVIDER TOKENS
PROVIDER:[TOKEN:[[id number]]] PROVIDER:[TOKEN:[[id number]]],FREE:[LAST:[[id number]],PHONE:[(   )    -],FAX:[(   )    -]]

## 2019-02-16 NOTE — DISCHARGE NOTE ADULT - MEDICATION SUMMARY - MEDICATIONS TO STOP TAKING
I will STOP taking the medications listed below when I get home from the hospital:    NIFEdipine 90 mg oral tablet, extended release  -- 1 tab(s) by mouth once a day    Benicar 40 mg oral tablet  -- 1 tab(s) by mouth once a day    HYDROmorphone 4 mg oral tablet  -- 1 tab(s) by mouth every 4 hours, As needed, Moderate Pain    lidocaine 5% topical film  -- Apply on skin to affected area once a day    NIFEdipine 90 mg oral tablet, extended release  -- 1 tab(s) by mouth once a day

## 2019-02-16 NOTE — DISCHARGE NOTE ADULT - PATIENT PORTAL LINK FT
You can access the [facility] Patient Portal, offered by [facility], by registering with the following website: [email or web address]

## 2019-02-16 NOTE — DISCHARGE NOTE ADULT - CARE PLAN
Principal Discharge DX:	NSTEMI (non-ST elevated myocardial infarction)  Goal:	stable for discharge  Assessment and plan of treatment:	It is important to see your primary physician as well as the physicians noted below within the next week to perform a comprehensive medical review.  Call their offices for an appointment as soon as you leave the hospital.  If you do not have a primary physician, contact the [facility] [place] [phone] located on [address] [place] [address], [place] [address].  Your medical issues appear to be stable at this time, but if your symptoms recur or worsen, contact your physicians and/or return to the hospital if necessary.  If you encounter any issues or questions with your medication, call your physicians before stopping the medication.  Do not drive.  Limit your diet to 2 grams of sodium daily.  Secondary Diagnosis:	EDER (acute kidney injury)  Secondary Diagnosis:	Acute systolic congestive heart failure  Secondary Diagnosis:	Acquired hypothyroidism  Secondary Diagnosis:	Lung cancer metastatic to bone

## 2019-02-16 NOTE — DISCHARGE NOTE ADULT - CARE PROVIDER_API CALL
Maria M Kirk)  Cardiovascular Disease; Internal Medicine  [address] [place] [id number]  Phone: [phone]  Fax: [phone]  Follow Up Time: Maria M Kirk)  Cardiovascular Disease; Internal Medicine  [address] [place] [id number]  Phone: [phone]  Fax: [phone]  Follow Up Time:     Mahad   Phone: (   )    -  Fax: (   )    -  Follow Up Time:

## 2019-02-16 NOTE — PROGRESS NOTE ADULT - ASSESSMENT
73 year old female with metastatic cancer (treated in past with chemo/RT), now with stage 4 with mets to the iliac bone, colon, and lung, HTN, CAD, HFrEF, ES COPD on home O2, HLD, hypothyroidism, metastatic Lung CA (received chemo and XRT), former smoker, admitted for back pain and found to have NSTEMI. S/P cardiac cath on 2/8 with occ of prox LCX and  of RCA s/p thrombectomy with stent. Course complicated by acute decompensated heart failure s/p diuretics and acute respiratory failure requiring bipap. RRT 2/12 again for acute respiratory failure requiring bipap support and transferred to ICU level of care. Milrinone and diuresis started. weaned to NC O2 and downgraded to floor. Milrinone was continued x 1 day on the floor and d/rell on 2/14.  Pt currently asymptomatic.  per cardiology, planning for outpatient f/u.    > Acute hypoxemic respiratory failure 2/2 pulmonary edema 2/2 Acute systolic heart failure from ICM.    Prednisone maintainence home dose for chr resp failure.  Off high flow O2.  Plan d/c home.  No ACEi/ Entresto sec to renal failure  > EDER on CKD stg 2 - JOAQUIN vs EDER- sec to CHF/ Medications.   Crt stabilizing now  Avoid nephrotoxics as possible and renally dose meds  > NSTEMI - s/p recent Cx PCI with total RCA.  Continue Brilinta 90 mg bid, Aspirin 81mg Atorvastatin 40mg   > Elevated LFTs - congestive hepatopathy, sec to CHF - Monitor for resolution - Cont statins  > COPD - ABG with no resp acidosis.  At home- On home O2 PRN only. Duoneb.  Prednisone 5mg daily  > Metastatic Lung CA - Continue pain medication.  MOLST signed and pt is DNR/DNI  > HTN Continue Nifedipine XL 90mg   > Synthroid Pt compliant with Synthroid 200mcg at home, TSH 12, increased dose to 225 mcg, check TSH as outpt  > Tobacco use Nicotine patch removed as not smoked since a few months & no signs of withdrawal    Supportive   DVT prophylaxis: SCD  GI prophylaxis: PPI 40mg   Diet: DASH     Medication list obtained from her pharmacy, as pt did not have it with her. Med list updated and reconciled.

## 2019-02-16 NOTE — DISCHARGE NOTE ADULT - PLAN OF CARE
stable for discharge It is important to see your primary physician as well as the physicians noted below within the next week to perform a comprehensive medical review.  Call their offices for an appointment as soon as you leave the hospital.  If you do not have a primary physician, contact the [facility] [place] [phone] located on [address] [place] [address].  Your medical issues appear to be stable at this time, but if your symptoms recur or worsen, contact your physicians and/or return to the hospital if necessary.  If you encounter any issues or questions with your medication, call your physicians before stopping the medication.  Do not drive.  Limit your diet to 2 grams of sodium daily.

## 2019-02-16 NOTE — DISCHARGE NOTE ADULT - SECONDARY DIAGNOSIS.
EDER (acute kidney injury) Acute systolic congestive heart failure Acquired hypothyroidism Lung cancer metastatic to bone

## 2019-02-16 NOTE — DISCHARGE NOTE ADULT - MEDICATION SUMMARY - MEDICATIONS TO TAKE
I will START or STAY ON the medications listed below when I get home from the hospital:    predniSONE 5 mg oral tablet  -- 1 tab(s) by mouth once a day  -- Indication: For COPD    aspirin 81 mg oral tablet, chewable  -- 1 tab(s) by mouth once a day  -- Indication: For NSTEMI (non-ST elevated myocardial infarction)    traMADol 50 mg oral tablet  -- 1 tab(s) by mouth 2 times a day  -- Indication: For Pain    isosorbide mononitrate 30 mg oral tablet, extended release  -- 1 tab(s) by mouth once a day  -- Indication: For Non-ST elevation myocardial infarction (NSTEMI)    atorvastatin 40 mg oral tablet  -- 1 tab(s) by mouth once a day (at bedtime)  -- Indication: For Non-ST elevation myocardial infarction (NSTEMI)    ticagrelor 90 mg oral tablet  -- 1 tab(s) by mouth 2 times a day  -- Indication: For Non-ST elevation myocardial infarction (NSTEMI)    metoprolol tartrate 25 mg oral tablet  -- 1 tab(s) by mouth 2 times a day  -- Indication: For Non-ST elevation myocardial infarction (NSTEMI)    tiotropium 18 mcg inhalation capsule  -- 1 cap(s) inhaled once a day  -- Indication: For COPD    furosemide 40 mg oral tablet  -- 1 tab(s) by mouth once a day   -- Indication: For CHF    buPROPion 150 mg/24 hours (XL) oral tablet, extended release  -- 1 tab(s) by mouth every 24 hours  -- Indication: For Smoking cessation    levothyroxine 75 mcg (0.075 mg) oral tablet  -- 3 tab(s) by mouth once a day  -- Indication: For Hypothyroidism    hydrALAZINE 10 mg oral tablet  -- 2 tab(s) by mouth every 6 hours  -- Indication: For Hypertension I will START or STAY ON the medications listed below when I get home from the hospital:    predniSONE 5 mg oral tablet  -- 1 tab(s) by mouth once a day  -- Indication: For COPD    aspirin 81 mg oral tablet, chewable  -- 1 tab(s) by mouth once a day  -- Indication: For NSTEMI (non-ST elevated myocardial infarction)    traMADol 50 mg oral tablet  -- 1 tab(s) by mouth 2 times a day  -- Indication: For Pain    isosorbide mononitrate 30 mg oral tablet, extended release  -- 1 tab(s) by mouth once a day  -- Indication: For Non-ST elevation myocardial infarction (NSTEMI)    atorvastatin 40 mg oral tablet  -- 1 tab(s) by mouth once a day (at bedtime)  -- Indication: For Non-ST elevation myocardial infarction (NSTEMI)    ticagrelor 90 mg oral tablet  -- 1 tab(s) by mouth 2 times a day  -- Indication: For Non-ST elevation myocardial infarction (NSTEMI)    metoprolol tartrate 25 mg oral tablet  -- 1 tab(s) by mouth 2 times a day  -- Indication: For Non-ST elevation myocardial infarction (NSTEMI)    tiotropium 18 mcg inhalation capsule  -- 1 cap(s) inhaled once a day  -- Indication: For COPD    ipratropium-albuterol 0.5 mg-2.5 mg/3 mLinhalation solution  -- 3 milliliter(s) by nebulizer 4 times a day as needed SOB  -- For inhalation only.  It is very important that you take or use this exactly as directed.  Do not skip doses or discontinue unless directed by your doctor.  Obtain medical advice before taking any non-prescription drugs as some may affect the action of this medication.    -- Indication: For COPD    furosemide 40 mg oral tablet  -- 1 tab(s) by mouth once a day   -- Indication: For CHF    buPROPion 150 mg/24 hours (XL) oral tablet, extended release  -- 1 tab(s) by mouth every 24 hours  -- Indication: For Smoking cessation    levothyroxine 75 mcg (0.075 mg) oral tablet  -- 3 tab(s) by mouth once a day  -- Indication: For Hypothyroidism    hydrALAZINE 10 mg oral tablet  -- 2 tab(s) by mouth every 6 hours  -- Indication: For Hypertension

## 2019-02-16 NOTE — PROGRESS NOTE ADULT - SUBJECTIVE AND OBJECTIVE BOX
Patient: JOEY CHRISTOPHER [id number] 73y Female                           Internal Medicine Hospitalist Progress Note    Interval History:  No complaints.  No SOB.  No chest pain / palpitations.     ____________________PHYSICAL EXAM:  Vitals reviewed as indicated below  GENERAL:  NAD Alert and Oriented x 3   HEENT: NCAT  CARDIOVASCULAR:  S1, S2  LUNGS: CTAB  ABDOMEN:  soft, (-) tenderness, (-) distension, (+) bowel sounds, (-) guarding, (-) rebound (-) rigidity  EXTREMITIES:  no cyanosis / clubbing / edema.   ____________________      BACKGROUND:  HEALTH ISSUES - PROBLEM Dx:  EDER (acute kidney injury): EDER (acute kidney injury)  Acute systolic congestive heart failure: Acute systolic congestive heart failure  Anxiety: Anxiety  Advanced care planning/counseling discussion: Advanced care planning/counseling discussion  Hypothyroid: Hypothyroid  Lung cancer metastatic to bone: Lung cancer metastatic to bone  Acute respiratory failure with hypoxia: Acute respiratory failure with hypoxia  Acute pulmonary edema: Acute pulmonary edema  NSTEMI (non-ST elevated myocardial infarction): NSTEMI (non-ST elevated myocardial infarction)        Allergies    No Known Allergies    Intolerances      PAST MEDICAL & SURGICAL HISTORY:  Carcinoma: metastic stage 4 with stephanie to the iliac bone, colon, and lung  Iliac crest bone pain: cancer, right  Anxiety  Hypothyroid  Hypertension  No significant past surgical history        VITALS:  Vital Signs Last 24 Hrs  T(C): 36.6 (16 Feb 2019 09:59), Max: 36.9 (15 Feb 2019 20:54)  T(F): 97.8 (16 Feb 2019 09:59), Max: 98.5 (15 Feb 2019 20:54)  HR: 59 (16 Feb 2019 13:04) (56 - 74)  BP: 126/74 (16 Feb 2019 13:04) (102/60 - 126/78)  BP(mean): --  RR: 20 (16 Feb 2019 09:59) (18 - 22)  SpO2: 98% (16 Feb 2019 06:15) (94% - 98%) Daily     Daily   CAPILLARY BLOOD GLUCOSE        I&O's Summary    15 Feb 2019 07:01  -  16 Feb 2019 07:00  --------------------------------------------------------  IN: 675.6 mL / OUT: 950 mL / NET: -274.4 mL          LABS:                        10.4   8.0   )-----------( 308      ( 16 Feb 2019 05:47 )             31.6     02-16    140  |  102  |  45.0<H>  ----------------------------<  115  4.8   |  22.0  |  1.60<H>    Ca    9.9      16 Feb 2019 05:47  Phos  3.8     02-16  Mg     2.3     02-16    TPro  7.5  /  Alb  3.2<L>  /  TBili  0.7  /  DBili  x   /  AST  26  /  ALT  24  /  AlkPhos  151<H>  02-16      LIVER FUNCTIONS - ( 16 Feb 2019 05:47 )  Alb: 3.2 g/dL / Pro: 7.5 g/dL / ALK PHOS: 151 U/L / ALT: 24 U/L / AST: 26 U/L / GGT: x                     MEDICATIONS:  MEDICATIONS  (STANDING):  ALBUTerol    90 MICROgram(s) HFA Inhaler 1 Puff(s) Inhalation every 4 hours  ALBUTerol/ipratropium for Nebulization 3 milliLiter(s) Nebulizer every 6 hours  aspirin  chewable 81 milliGRAM(s) Oral daily  atorvastatin 40 milliGRAM(s) Oral at bedtime  docusate sodium 100 milliGRAM(s) Oral two times a day  furosemide    Tablet 40 milliGRAM(s) Oral two times a day  guaiFENesin  milliGRAM(s) Oral every 12 hours  heparin  Injectable 5000 Unit(s) SubCutaneous every 8 hours  hydrALAZINE 10 milliGRAM(s) Oral every 8 hours  influenza   Vaccine 0.5 milliLiter(s) IntraMuscular once  isosorbide   mononitrate ER Tablet (IMDUR) 30 milliGRAM(s) Oral daily  levothyroxine 225 MICROGram(s) Oral daily  lidocaine   Patch 1 Patch Transdermal daily  metoprolol tartrate 25 milliGRAM(s) Oral two times a day  pantoprazole    Tablet 40 milliGRAM(s) Oral before breakfast  predniSONE   Tablet 5 milliGRAM(s) Oral daily  ticagrelor 90 milliGRAM(s) Oral two times a day  tiotropium 18 MICROgram(s) Capsule 1 Capsule(s) Inhalation daily  vitamin A &amp; D Ointment 1 Application(s) Topical three times a day    MEDICATIONS  (PRN):  bisacodyl Suppository 10 milliGRAM(s) Rectal daily PRN Constipation  cyclobenzaprine 10 milliGRAM(s) Oral three times a day PRN Muscle Spasm  ondansetron Injectable 4 milliGRAM(s) IV Push every 8 hours PRN Nausea and/or Vomiting  oxyCODONE    5 mG/acetaminophen 325 mG 1 Tablet(s) Oral every 6 hours PRN Severe Pain (7 - 10)  senna 2 Tablet(s) Oral at bedtime PRN Constipation

## 2019-02-16 NOTE — CHART NOTE - NSCHARTNOTEFT_GEN_A_CORE
Source: Patient [x]  Family [ ]   other [x] EMR    Current Diet: Diet, DASH/TLC:   Sodium & Cholesterol Restricted  1000mL Fluid Restriction ([id number]) (02-13-19 @ 10:53)      Patient reports [ ] nausea  [ ] vomiting [x] diarrhea [ ] constipation  [ ]chewing problems [ ] swallowing issues  [ ] other:     PO intake: Usually 50-75% of meals    Source for PO intake [x] Patient [ ] family [ ] chart [ ] staff [ ] other    Current Weight:   (2/15) 190.2 lbs  (2/12) 192.6 lbs  (2/10) 195.7 lbs  (2/9) 192 lbs  (2/8) 193.5 lbs  Continue to trend weights daily, fluctuations likely related to fluid, maintain strict Is&Os    Pertinent Medications: MEDICATIONS  (STANDING):  ALBUTerol    90 MICROgram(s) HFA Inhaler 1 Puff(s) Inhalation every 4 hours  ALBUTerol/ipratropium for Nebulization 3 milliLiter(s) Nebulizer every 6 hours  aspirin  chewable 81 milliGRAM(s) Oral daily  atorvastatin 40 milliGRAM(s) Oral at bedtime  docusate sodium 100 milliGRAM(s) Oral two times a day  furosemide    Tablet 40 milliGRAM(s) Oral two times a day  guaiFENesin  milliGRAM(s) Oral every 12 hours  heparin  Injectable 5000 Unit(s) SubCutaneous every 8 hours  hydrALAZINE 10 milliGRAM(s) Oral every 8 hours  influenza   Vaccine 0.5 milliLiter(s) IntraMuscular once  isosorbide   mononitrate ER Tablet (IMDUR) 30 milliGRAM(s) Oral daily  levothyroxine 225 MICROGram(s) Oral daily  lidocaine   Patch 1 Patch Transdermal daily  metoprolol tartrate 25 milliGRAM(s) Oral two times a day  pantoprazole    Tablet 40 milliGRAM(s) Oral before breakfast  predniSONE   Tablet 5 milliGRAM(s) Oral daily  ticagrelor 90 milliGRAM(s) Oral two times a day  tiotropium 18 MICROgram(s) Capsule 1 Capsule(s) Inhalation daily  vitamin A &amp; D Ointment 1 Application(s) Topical three times a day    MEDICATIONS  (PRN):  bisacodyl Suppository 10 milliGRAM(s) Rectal daily PRN Constipation  cyclobenzaprine 10 milliGRAM(s) Oral three times a day PRN Muscle Spasm  ondansetron Injectable 4 milliGRAM(s) IV Push every 8 hours PRN Nausea and/or Vomiting  oxyCODONE    5 mG/acetaminophen 325 mG 1 Tablet(s) Oral every 6 hours PRN Severe Pain (7 - 10)  senna 2 Tablet(s) Oral at bedtime PRN Constipation    Pertinent Labs: CBC Full  -  ( 16 Feb 2019 05:47 )  WBC Count : 8.0 K/uL  Hemoglobin : 10.4 g/dL  Hematocrit : 31.6 %  Platelet Count - Automated : 308 K/uL  Mean Cell Volume : 89.0 fl  Mean Cell Hemoglobin : 29.3 pg  Mean Cell Hemoglobin Concentration : 32.9 g/dL  Auto Neutrophil # : x  Auto Lymphocyte # : x  Auto Monocyte # : x  Auto Eosinophil # : x  Auto Basophil # : x  Auto Neutrophil % : 73.0 %  Auto Lymphocyte % : 15.0 %  Auto Monocyte % : 9.0 %  Auto Eosinophil % : 2.0 %  Auto Basophil % : x    02-16 Na140 mmol/L Glu 115 mg/dL K+ 4.8 mmol/L Cr  1.60 mg/dL<H> BUN 45.0 mg/dL<H> Phos 3.8 mg/dL Alb 3.2 g/dL<L> PAB n/a       Skin: Surgical incision to right groin per documentation    Nutrition focused physical exam NOT conducted - found signs of malnutrition [ ]absent [ ]present    Subcutaneous fat loss: [ ] Orbital fat pads region, [ ]Buccal fat region, [ ]Triceps region,  [ ]Ribs region    Muscle wasting: [ ]Temples region, [ ]Clavicle region, [ ]Shoulder region, [ ]Scapula region, [ ]Interosseous region,  [ ]thigh region, [ ]Calf region    Estimated Needs:   [x] no change since previous assessment  [ ] recalculated:     Current Nutrition Diagnosis: Pt remains at high nutrition risk secondary to malnutrition (moderate, chronic) related to inadequate energy intake with increased needs in setting of end stage COPD and metastatic lung cancer as evidenced by meeting <75% energy intake >7 days and 1+ generalized edema. Pt reports appetite/po intake has been improving. However, states today she experienced diarrhea 5x. Breakfast tray at bedside, only had a small cereal- states her appetite feels decreased today due to GI upset. Denies chewing/swallowing difficulty. Last BM 2/14.       Recommendations:   1) Continue diet as tolerated; fluid restriction per MD discretion.  2) Add Ensure Enlive TID to optimize po intake and provide an additional 350 kcal, 20g protein per serving.  3) Rx: Florastor to support gut integrity. If diarrhea continues, suggest Metamucil 1 pkt 2x/day to add bulk to stool.   4) Encourage po intake at all meals, specifically HBV protein sources.     Monitoring and Evaluation:   [x] PO intake [x] Tolerance to diet prescription [X] Weights  [X] Follow up per protocol [X] Labs

## 2019-02-16 NOTE — PROGRESS NOTE ADULT - SUBJECTIVE AND OBJECTIVE BOX
[place] [facility] - [facility], [facility]                                   [address] [place] [address]                                                      PHONE: [phone]                                                         FAX: [phone]  [email or web address]  ---------------------------------------------------------------------------------------------------------------------------------    FU for  Pt seen and examined. patient denies any complaints    Overnight events/patient complaints:    No Known Allergies    MEDICATIONS  (STANDING):  ALBUTerol    90 MICROgram(s) HFA Inhaler 1 Puff(s) Inhalation every 4 hours  ALBUTerol/ipratropium for Nebulization 3 milliLiter(s) Nebulizer every 6 hours  aspirin  chewable 81 milliGRAM(s) Oral daily  atorvastatin 40 milliGRAM(s) Oral at bedtime  docusate sodium 100 milliGRAM(s) Oral two times a day  furosemide    Tablet 40 milliGRAM(s) Oral two times a day  guaiFENesin  milliGRAM(s) Oral every 12 hours  heparin  Injectable 5000 Unit(s) SubCutaneous every 8 hours  hydrALAZINE 10 milliGRAM(s) Oral every 8 hours  influenza   Vaccine 0.5 milliLiter(s) IntraMuscular once  isosorbide   mononitrate ER Tablet (IMDUR) 30 milliGRAM(s) Oral daily  levothyroxine 225 MICROGram(s) Oral daily  lidocaine   Patch 1 Patch Transdermal daily  metoprolol tartrate 25 milliGRAM(s) Oral two times a day  pantoprazole    Tablet 40 milliGRAM(s) Oral before breakfast  predniSONE   Tablet 5 milliGRAM(s) Oral daily  ticagrelor 90 milliGRAM(s) Oral two times a day  tiotropium 18 MICROgram(s) Capsule 1 Capsule(s) Inhalation daily  vitamin A &amp; D Ointment 1 Application(s) Topical three times a day    MEDICATIONS  (PRN):  bisacodyl Suppository 10 milliGRAM(s) Rectal daily PRN Constipation  cyclobenzaprine 10 milliGRAM(s) Oral three times a day PRN Muscle Spasm  ondansetron Injectable 4 milliGRAM(s) IV Push every 8 hours PRN Nausea and/or Vomiting  oxyCODONE    5 mG/acetaminophen 325 mG 1 Tablet(s) Oral every 6 hours PRN Severe Pain (7 - 10)  senna 2 Tablet(s) Oral at bedtime PRN Constipation      Vital Signs Last 24 Hrs  T(C): 36.4 (16 Feb 2019 06:15), Max: 36.9 (15 Feb 2019 20:54)  T(F): 97.6 (16 Feb 2019 06:15), Max: 98.5 (15 Feb 2019 20:54)  HR: 61 (16 Feb 2019 06:15) (61 - 74)  BP: 126/78 (16 Feb 2019 06:15) (102/60 - 126/78)  BP(mean): --  RR: 22 (16 Feb 2019 06:15) (18 - 22)  SpO2: 98% (16 Feb 2019 06:15) (94% - 98%)  ICU Vital Signs Last 24 Hrs  I&O's Summary    15 Feb 2019 07:01  -  16 Feb 2019 07:00  --------------------------------------------------------  IN: 675.6 mL / OUT: 950 mL / NET: -274.4 mL        PHYSICAL EXAM:  General: well built, resting comfortably  HEENT: no JVD  CARDIOVASCULAR: Normal S1 and S2, No murmur, rub, gallop or lift.   LUNGS: No rales, rhonchi or wheeze. Normal breath sounds bilaterally.  ABDOMEN: Soft, nontender without mass or organomegaly. bowel sounds normoactive.  EXTREMITIES: edema++  Neuro: awake alert          LABS:                        10.4   8.0   )-----------( 308      ( 16 Feb 2019 05:47 )             31.6     02-16    140  |  102  |  45.0<H>  ----------------------------<  115  4.8   |  22.0  |  1.60<H>    Ca    9.9      16 Feb 2019 05:47  Phos  3.8     02-16  Mg     2.3     02-16    TPro  7.5  /  Alb  3.2<L>  /  TBili  0.7  /  DBili  x   /  AST  26  /  ALT  24  /  AlkPhos  151<H>  02-16    JOEY ASHLEIGHMAAME            RADIOLOGY & ADDITIONAL STUDIES:    LABS:                        10.4   8.0   )-----------( 308      ( 16 Feb 2019 05:47 )             31.6     02-16    140  |  102  |  45.0<H>  ----------------------------<  115  4.8   |  22.0  |  1.60<H>    Ca    9.9      16 Feb 2019 05:47  Phos  3.8     02-16  Mg     2.3     02-16    TPro  7.5  /  Alb  3.2<L>  /  TBili  0.7  /  DBili  x   /  AST  26  /  ALT  24  /  AlkPhos  151<H>  02-16    Summary:   1. Left ventricular ejection fraction, by visual estimation, is 25 to   30%.   2. Technically difficult study.   3. Multiple left ventricular regional wall motion abnormalities exist.   See wall motion findings.   4. Small pericardial effusion.    [id number] Antony Hoyos MD, Electronically signed on 2/12/2019 at 3:39:35 PM      Assessment and Plan:  74 yo F admitted with back pain-had ekg changes of ischemia and elevated troponins-cathed with stenting to Circ and % occluded-no intervention. Post procedure developed EDER. No prior cardiac hx. Pt has metastatic lung cancer/hypothyroidism/copd (on home02). Echo shows EF 35%   ICMP/CHf: recommend continuing current meds. Can d/c home from cardiac standpoint with Op follow up  CRI: renal fn stable

## 2019-02-16 NOTE — DISCHARGE NOTE ADULT - HOSPITAL COURSE
Patient: JOEY CHRISTOPHER [id number]                 Internal Medicine Hospitalist Discharge Note    73 year old female with metastatic cancer (treated in past with chemo/RT), now with stage 4 with mets to the iliac bone, colon, and lung, HTN, CAD, HFrEF, ES COPD on home O2, HLD, hypothyroidism, metastatic Lung CA (received chemo and XRT), former smoker, admitted for back pain and found to have NSTEMI. S/P cardiac cath on 2/8 with occ of prox LCX and  of RCA s/p thrombectomy with stent. Course complicated by acute decompensated heart failure s/p diuretics and acute respiratory failure requiring bipap. RRT 2/12 again for acute respiratory failure requiring bipap support and transferred to ICU level of care. Milrinone and diuresis started. weaned to NC O2 and downgraded to floor. Milrinone was continued x 1 day on the floor and d/rell on 2/14.  Pt currently asymptomatic.  per cardiology, planning for outpatient f/u.    > Acute hypoxemic respiratory failure 2/2 pulmonary edema 2/2 Acute systolic heart failure from ICM.    Prednisone maintainence home dose for chr resp failure.  Off high flow O2.  Plan d/c home.  No ACEi/ Entresto sec to renal failure  > EDER on CKD stg 2 - JOAQUIN vs EDER- sec to CHF/ Medications.   Crt stabilizing now  Avoid nephrotoxics as possible and renally dose meds  > NSTEMI - s/p recent Cx PCI with total RCA.  Continue Brilinta 90 mg bid, Aspirin 81mg Atorvastatin 40mg   > Elevated LFTs - congestive hepatopathy, sec to CHF - Monitor for resolution - Cont statins  > COPD - ABG with no resp acidosis.  At home- On home O2 PRN only. Duoneb.  Prednisone 5mg daily  > Metastatic Lung CA - Continue pain medication.  MOLST signed and pt is DNR/DNI  > HTN Continue Nifedipine XL 90mg   > Synthroid Pt compliant with Synthroid 200mcg at home, TSH 12, increased dose to 225 mcg, check TSH as outpt    Disposition: stable for discharge.  Outpatient followup as above.  Patient was advised to return if they experience any recurrence or worsening of symptoms.  Total time spent on discharge was 45 minutes.  -Hank Velasquez D.O., Hospitalist, [facility]

## 2019-02-16 NOTE — DISCHARGE NOTE ADULT - MEDICATION SUMMARY - MEDICATIONS TO CHANGE
I will SWITCH the dose or number of times a day I take the medications listed below when I get home from the hospital:    Synthroid 125 mcg (0.125 mg) oral tablet  -- 1 tab(s) by mouth once a day    furosemide  -- 20 milligram(s) by mouth once a day    levothyroxine 200 mcg (0.2 mg) oral tablet  -- orally once a day -Fri

## 2019-02-16 NOTE — DISCHARGE NOTE ADULT - COMMUNITY RESOURCES
REFERRAL MADE TO [facility] TYRONE DIAZ'S OFFICE WILL CALL TO SCHEDULE FOLLOW UP VISIT [phone] [address] [place] [address]

## 2019-02-17 VITALS
HEART RATE: 58 BPM | SYSTOLIC BLOOD PRESSURE: 108 MMHG | RESPIRATION RATE: 18 BRPM | OXYGEN SATURATION: 97 % | DIASTOLIC BLOOD PRESSURE: 62 MMHG

## 2019-02-17 LAB
ANION GAP SERPL CALC-SCNC: 14 MMOL/L — SIGNIFICANT CHANGE UP (ref 5–17)
BASOPHILS # BLD AUTO: 0 K/UL — SIGNIFICANT CHANGE UP (ref 0–0.2)
BASOPHILS NFR BLD AUTO: 0.4 % — SIGNIFICANT CHANGE UP (ref 0–2)
BUN SERPL-MCNC: 40 MG/DL — HIGH (ref 8–20)
CALCIUM SERPL-MCNC: 10 MG/DL — SIGNIFICANT CHANGE UP (ref 8.6–10.2)
CHLORIDE SERPL-SCNC: 101 MMOL/L — SIGNIFICANT CHANGE UP (ref 98–107)
CO2 SERPL-SCNC: 23 MMOL/L — SIGNIFICANT CHANGE UP (ref 22–29)
CREAT SERPL-MCNC: 1.62 MG/DL — HIGH (ref 0.5–1.3)
EOSINOPHIL # BLD AUTO: 0.3 K/UL — SIGNIFICANT CHANGE UP (ref 0–0.5)
EOSINOPHIL NFR BLD AUTO: 3.9 % — SIGNIFICANT CHANGE UP (ref 0–6)
GLUCOSE SERPL-MCNC: 114 MG/DL — SIGNIFICANT CHANGE UP (ref 70–115)
HCT VFR BLD CALC: 34.5 % — LOW (ref 37–47)
HGB BLD-MCNC: 11.2 G/DL — LOW (ref 12–16)
LYMPHOCYTES # BLD AUTO: 1.4 K/UL — SIGNIFICANT CHANGE UP (ref 1–4.8)
LYMPHOCYTES # BLD AUTO: 18.4 % — LOW (ref 20–55)
MAGNESIUM SERPL-MCNC: 2.3 MG/DL — SIGNIFICANT CHANGE UP (ref 1.6–2.6)
MCHC RBC-ENTMCNC: 28.9 PG — SIGNIFICANT CHANGE UP (ref 27–31)
MCHC RBC-ENTMCNC: 32.5 G/DL — SIGNIFICANT CHANGE UP (ref 32–36)
MCV RBC AUTO: 88.9 FL — SIGNIFICANT CHANGE UP (ref 81–99)
MONOCYTES # BLD AUTO: 0.5 K/UL — SIGNIFICANT CHANGE UP (ref 0–0.8)
MONOCYTES NFR BLD AUTO: 6.6 % — SIGNIFICANT CHANGE UP (ref 3–10)
NEUTROPHILS # BLD AUTO: 5.1 K/UL — SIGNIFICANT CHANGE UP (ref 1.8–8)
NEUTROPHILS NFR BLD AUTO: 69.4 % — SIGNIFICANT CHANGE UP (ref 37–73)
PHOSPHATE SERPL-MCNC: 4.2 MG/DL — SIGNIFICANT CHANGE UP (ref 2.4–4.7)
PLATELET # BLD AUTO: 344 K/UL — SIGNIFICANT CHANGE UP (ref 150–400)
POTASSIUM SERPL-MCNC: 4.2 MMOL/L — SIGNIFICANT CHANGE UP (ref 3.5–5.3)
POTASSIUM SERPL-SCNC: 4.2 MMOL/L — SIGNIFICANT CHANGE UP (ref 3.5–5.3)
RBC # BLD: 3.88 M/UL — LOW (ref 4.4–5.2)
RBC # FLD: 15.1 % — SIGNIFICANT CHANGE UP (ref 11–15.6)
SODIUM SERPL-SCNC: 138 MMOL/L — SIGNIFICANT CHANGE UP (ref 135–145)
WBC # BLD: 7.4 K/UL — SIGNIFICANT CHANGE UP (ref 4.8–10.8)
WBC # FLD AUTO: 7.4 K/UL — SIGNIFICANT CHANGE UP (ref 4.8–10.8)

## 2019-02-17 PROCEDURE — 82435 ASSAY OF BLOOD CHLORIDE: CPT

## 2019-02-17 PROCEDURE — 82330 ASSAY OF CALCIUM: CPT

## 2019-02-17 PROCEDURE — 96376 TX/PRO/DX INJ SAME DRUG ADON: CPT

## 2019-02-17 PROCEDURE — 84436 ASSAY OF TOTAL THYROXINE: CPT

## 2019-02-17 PROCEDURE — 84480 ASSAY TRIIODOTHYRONINE (T3): CPT

## 2019-02-17 PROCEDURE — 99285 EMERGENCY DEPT VISIT HI MDM: CPT | Mod: 25

## 2019-02-17 PROCEDURE — 96375 TX/PRO/DX INJ NEW DRUG ADDON: CPT

## 2019-02-17 PROCEDURE — C1725: CPT

## 2019-02-17 PROCEDURE — C1760: CPT

## 2019-02-17 PROCEDURE — C1874: CPT

## 2019-02-17 PROCEDURE — 84132 ASSAY OF SERUM POTASSIUM: CPT

## 2019-02-17 PROCEDURE — 84484 ASSAY OF TROPONIN QUANT: CPT

## 2019-02-17 PROCEDURE — 36415 COLL VENOUS BLD VENIPUNCTURE: CPT

## 2019-02-17 PROCEDURE — 83874 ASSAY OF MYOGLOBIN: CPT

## 2019-02-17 PROCEDURE — C1769: CPT

## 2019-02-17 PROCEDURE — 84443 ASSAY THYROID STIM HORMONE: CPT

## 2019-02-17 PROCEDURE — 80048 BASIC METABOLIC PNL TOTAL CA: CPT

## 2019-02-17 PROCEDURE — 94640 AIRWAY INHALATION TREATMENT: CPT

## 2019-02-17 PROCEDURE — 71275 CT ANGIOGRAPHY CHEST: CPT

## 2019-02-17 PROCEDURE — C1894: CPT

## 2019-02-17 PROCEDURE — 85730 THROMBOPLASTIN TIME PARTIAL: CPT

## 2019-02-17 PROCEDURE — 99239 HOSP IP/OBS DSCHRG MGMT >30: CPT

## 2019-02-17 PROCEDURE — 93005 ELECTROCARDIOGRAM TRACING: CPT

## 2019-02-17 PROCEDURE — 82962 GLUCOSE BLOOD TEST: CPT

## 2019-02-17 PROCEDURE — 99153 MOD SED SAME PHYS/QHP EA: CPT

## 2019-02-17 PROCEDURE — 93458 L HRT ARTERY/VENTRICLE ANGIO: CPT | Mod: XU

## 2019-02-17 PROCEDURE — 84100 ASSAY OF PHOSPHORUS: CPT

## 2019-02-17 PROCEDURE — 72110 X-RAY EXAM L-2 SPINE 4/>VWS: CPT

## 2019-02-17 PROCEDURE — 80053 COMPREHEN METABOLIC PANEL: CPT

## 2019-02-17 PROCEDURE — 82553 CREATINE MB FRACTION: CPT

## 2019-02-17 PROCEDURE — 74174 CTA ABD&PLVS W/CONTRAST: CPT

## 2019-02-17 PROCEDURE — 83690 ASSAY OF LIPASE: CPT

## 2019-02-17 PROCEDURE — 85610 PROTHROMBIN TIME: CPT

## 2019-02-17 PROCEDURE — 84295 ASSAY OF SERUM SODIUM: CPT

## 2019-02-17 PROCEDURE — 83735 ASSAY OF MAGNESIUM: CPT

## 2019-02-17 PROCEDURE — 71045 X-RAY EXAM CHEST 1 VIEW: CPT

## 2019-02-17 PROCEDURE — 82947 ASSAY GLUCOSE BLOOD QUANT: CPT

## 2019-02-17 PROCEDURE — 83880 ASSAY OF NATRIURETIC PEPTIDE: CPT

## 2019-02-17 PROCEDURE — 82550 ASSAY OF CK (CPK): CPT

## 2019-02-17 PROCEDURE — 85014 HEMATOCRIT: CPT

## 2019-02-17 PROCEDURE — C9606: CPT | Mod: LC

## 2019-02-17 PROCEDURE — C1757: CPT

## 2019-02-17 PROCEDURE — 93306 TTE W/DOPPLER COMPLETE: CPT

## 2019-02-17 PROCEDURE — 82803 BLOOD GASES ANY COMBINATION: CPT

## 2019-02-17 PROCEDURE — 96374 THER/PROPH/DIAG INJ IV PUSH: CPT | Mod: XU

## 2019-02-17 PROCEDURE — 94660 CPAP INITIATION&MGMT: CPT

## 2019-02-17 PROCEDURE — 36600 WITHDRAWAL OF ARTERIAL BLOOD: CPT

## 2019-02-17 PROCEDURE — C1887: CPT

## 2019-02-17 PROCEDURE — 99152 MOD SED SAME PHYS/QHP 5/>YRS: CPT

## 2019-02-17 PROCEDURE — 83605 ASSAY OF LACTIC ACID: CPT

## 2019-02-17 PROCEDURE — 85027 COMPLETE CBC AUTOMATED: CPT

## 2019-02-17 RX ORDER — IPRATROPIUM/ALBUTEROL SULFATE 18-103MCG
3 AEROSOL WITH ADAPTER (GRAM) INHALATION
Qty: 30 | Refills: 0
Start: 2019-02-17 | End: 2019-03-18

## 2019-02-17 RX ADMIN — Medication 10 MILLIGRAM(S): at 05:09

## 2019-02-17 RX ADMIN — Medication 5 MILLIGRAM(S): at 05:09

## 2019-02-17 RX ADMIN — HEPARIN SODIUM 5000 UNIT(S): 5000 INJECTION INTRAVENOUS; SUBCUTANEOUS at 05:08

## 2019-02-17 RX ADMIN — ISOSORBIDE MONONITRATE 30 MILLIGRAM(S): 60 TABLET, EXTENDED RELEASE ORAL at 12:13

## 2019-02-17 RX ADMIN — Medication 600 MILLIGRAM(S): at 12:12

## 2019-02-17 RX ADMIN — HEPARIN SODIUM 5000 UNIT(S): 5000 INJECTION INTRAVENOUS; SUBCUTANEOUS at 14:45

## 2019-02-17 RX ADMIN — Medication 1 APPLICATION(S): at 14:46

## 2019-02-17 RX ADMIN — PANTOPRAZOLE SODIUM 40 MILLIGRAM(S): 20 TABLET, DELAYED RELEASE ORAL at 05:09

## 2019-02-17 RX ADMIN — Medication 81 MILLIGRAM(S): at 12:12

## 2019-02-17 RX ADMIN — Medication 25 MILLIGRAM(S): at 05:09

## 2019-02-17 RX ADMIN — Medication 225 MICROGRAM(S): at 05:08

## 2019-02-17 RX ADMIN — TICAGRELOR 90 MILLIGRAM(S): 90 TABLET ORAL at 05:18

## 2019-02-17 RX ADMIN — LIDOCAINE 1 PATCH: 4 CREAM TOPICAL at 12:13

## 2019-02-17 RX ADMIN — Medication 40 MILLIGRAM(S): at 05:18

## 2019-02-17 RX ADMIN — Medication 10 MILLIGRAM(S): at 14:45

## 2019-02-17 RX ADMIN — Medication 3 MILLILITER(S): at 02:45

## 2019-02-17 RX ADMIN — Medication 100 MILLIGRAM(S): at 05:09

## 2019-02-17 NOTE — PROGRESS NOTE ADULT - SUBJECTIVE AND OBJECTIVE BOX
Patient: JOEY CHRISTOPHER [id number] 73y Female                           Internal Medicine Hospitalist Progress Note    Interval History:   at bedside.  States pt at baseline, ambulates with RW.  Pt had c/o diarrhea x 3 small episodes yesterday, now resolved with formed BM thyis am.  No Abdominal pain, nausea, vomiting, diarrhea, nor constipation. No SOB.  No chest pain / palpitations.     ____________________PHYSICAL EXAM:  Vitals reviewed as indicated below  GENERAL:  NAD Alert and Oriented x 3   HEENT: NCAT  CARDIOVASCULAR:  S1, S2  LUNGS: CTAB  ABDOMEN:  soft, (-) tenderness, (-) distension, (+) bowel sounds, (-) guarding, (-) rebound (-) rigidity  EXTREMITIES:  no cyanosis / clubbing / edema.   ____________________    VITALS:  Vital Signs Last 24 Hrs  T(C): 36.4 (2019 10:16), Max: 36.4 (2019 05:07)  T(F): 97.5 (2019 10:16), Max: 97.5 (2019 05:07)  HR: 60 (2019 10:16) (58 - 67)  BP: 100/65 (2019 10:16) (100/65 - 126/74)  BP(mean): --  RR: 18 (2019 10:16) (16 - 20)  SpO2: 98% (2019 09:26) (95% - 99%) Daily     Daily Weight in k.3 (2019 15:24)  CAPILLARY BLOOD GLUCOSE        I&O's Summary    2019 07:01  -  2019 12:11  --------------------------------------------------------  IN: 120 mL / OUT: 0 mL / NET: 120 mL        LABS:                        11.2   7.4   )-----------( 344      ( 2019 05:59 )             34.5     02-17    138  |  101  |  40.0<H>  ----------------------------<  114  4.2   |  23.0  |  1.62<H>    Ca    10.0      2019 05:59  Phos  4.2       Mg     2.3         TPro  7.5  /  Alb  3.2<L>  /  TBili  0.7  /  DBili  x   /  AST  26  /  ALT  24  /  AlkPhos  151<H>        LIVER FUNCTIONS - ( 2019 05:47 )  Alb: 3.2 g/dL / Pro: 7.5 g/dL / ALK PHOS: 151 U/L / ALT: 24 U/L / AST: 26 U/L / GGT: x                   MEDICATIONS:  ALBUTerol    90 MICROgram(s) HFA Inhaler 1 Puff(s) Inhalation every 4 hours  ALBUTerol/ipratropium for Nebulization 3 milliLiter(s) Nebulizer every 6 hours  aspirin  chewable 81 milliGRAM(s) Oral daily  atorvastatin 40 milliGRAM(s) Oral at bedtime  bisacodyl Suppository 10 milliGRAM(s) Rectal daily PRN  cyclobenzaprine 10 milliGRAM(s) Oral three times a day PRN  docusate sodium 100 milliGRAM(s) Oral two times a day  furosemide    Tablet 40 milliGRAM(s) Oral two times a day  guaiFENesin  milliGRAM(s) Oral every 12 hours  heparin  Injectable 5000 Unit(s) SubCutaneous every 8 hours  hydrALAZINE 10 milliGRAM(s) Oral every 8 hours  isosorbide   mononitrate ER Tablet (IMDUR) 30 milliGRAM(s) Oral daily  levothyroxine 225 MICROGram(s) Oral daily  lidocaine   Patch 1 Patch Transdermal daily  metoprolol tartrate 25 milliGRAM(s) Oral two times a day  ondansetron Injectable 4 milliGRAM(s) IV Push every 8 hours PRN  oxyCODONE    5 mG/acetaminophen 325 mG 1 Tablet(s) Oral every 6 hours PRN  pantoprazole    Tablet 40 milliGRAM(s) Oral before breakfast  predniSONE   Tablet 5 milliGRAM(s) Oral daily  senna 2 Tablet(s) Oral at bedtime PRN  ticagrelor 90 milliGRAM(s) Oral two times a day  tiotropium 18 MICROgram(s) Capsule 1 Capsule(s) Inhalation daily  vitamin A &amp; D Ointment 1 Application(s) Topical three times a day

## 2019-02-17 NOTE — PROGRESS NOTE ADULT - PROVIDER SPECIALTY LIST ADULT
Cardiology
Critical Care
Hospitalist
Internal Medicine
Internal Medicine
Palliative Care
Cardiology
Cardiology
Palliative Care
Palliative Care

## 2019-02-18 RX ORDER — ISOSORBIDE MONONITRATE 60 MG/1
1 TABLET, EXTENDED RELEASE ORAL
Qty: 30 | Refills: 0
Start: 2019-02-18

## 2019-02-18 RX ORDER — FUROSEMIDE 40 MG
1 TABLET ORAL
Qty: 30 | Refills: 0
Start: 2019-02-18

## 2019-02-18 RX ORDER — ATORVASTATIN CALCIUM 80 MG/1
1 TABLET, FILM COATED ORAL
Qty: 30 | Refills: 0
Start: 2019-02-18

## 2019-02-18 RX ORDER — LEVOTHYROXINE SODIUM 125 MCG
3 TABLET ORAL
Qty: 90 | Refills: 0
Start: 2019-02-18

## 2019-02-18 RX ORDER — METOPROLOL TARTRATE 50 MG
1 TABLET ORAL
Qty: 60 | Refills: 0
Start: 2019-02-18

## 2019-02-18 RX ORDER — TIOTROPIUM BROMIDE 18 UG/1
1 CAPSULE ORAL; RESPIRATORY (INHALATION)
Qty: 1 | Refills: 0
Start: 2019-02-18

## 2019-02-18 RX ORDER — TICAGRELOR 90 MG/1
1 TABLET ORAL
Qty: 60 | Refills: 0
Start: 2019-02-18

## 2019-03-05 NOTE — ED PROVIDER NOTE - PMH
Anxiety    Carcinoma  metastic stage 4 with stephanie to the iliac bone, colon, and lung  Hypertension    Hypothyroid    Iliac crest bone pain  cancer, right n/a

## 2019-12-06 ENCOUNTER — INPATIENT (INPATIENT)
Facility: HOSPITAL | Age: 74
LOS: 3 days | Discharge: HOME | DRG: 291 | End: 2019-12-10
Admitting: HOSPITALIST
Payer: MEDICARE

## 2019-12-06 VITALS
HEART RATE: 97 BPM | SYSTOLIC BLOOD PRESSURE: 174 MMHG | OXYGEN SATURATION: 89 % | WEIGHT: 164.91 LBS | DIASTOLIC BLOOD PRESSURE: 92 MMHG | HEIGHT: 67 IN | RESPIRATION RATE: 36 BRPM

## 2019-12-06 DIAGNOSIS — I25.10 ATHEROSCLEROTIC HEART DISEASE OF NATIVE CORONARY ARTERY WITHOUT ANGINA PECTORIS: ICD-10-CM

## 2019-12-06 DIAGNOSIS — E03.9 HYPOTHYROIDISM, UNSPECIFIED: ICD-10-CM

## 2019-12-06 DIAGNOSIS — Z85.118 PERSONAL HISTORY OF OTHER MALIGNANT NEOPLASM OF BRONCHUS AND LUNG: ICD-10-CM

## 2019-12-06 DIAGNOSIS — Z98.890 OTHER SPECIFIED POSTPROCEDURAL STATES: Chronic | ICD-10-CM

## 2019-12-06 DIAGNOSIS — J44.9 CHRONIC OBSTRUCTIVE PULMONARY DISEASE, UNSPECIFIED: ICD-10-CM

## 2019-12-06 DIAGNOSIS — J96.01 ACUTE RESPIRATORY FAILURE WITH HYPOXIA: ICD-10-CM

## 2019-12-06 DIAGNOSIS — E11.69 TYPE 2 DIABETES MELLITUS WITH OTHER SPECIFIED COMPLICATION: ICD-10-CM

## 2019-12-06 DIAGNOSIS — I50.23 ACUTE ON CHRONIC SYSTOLIC (CONGESTIVE) HEART FAILURE: ICD-10-CM

## 2019-12-06 LAB
ALBUMIN SERPL ELPH-MCNC: 4.1 G/DL — SIGNIFICANT CHANGE UP (ref 3.3–5.2)
ALP SERPL-CCNC: 96 U/L — SIGNIFICANT CHANGE UP (ref 40–120)
ALT FLD-CCNC: 15 U/L — SIGNIFICANT CHANGE UP
ANION GAP SERPL CALC-SCNC: 16 MMOL/L — SIGNIFICANT CHANGE UP (ref 5–17)
APTT BLD: 26.7 SEC — LOW (ref 27.5–36.3)
AST SERPL-CCNC: 21 U/L — SIGNIFICANT CHANGE UP
BASOPHILS # BLD AUTO: 0.13 K/UL — SIGNIFICANT CHANGE UP (ref 0–0.2)
BASOPHILS NFR BLD AUTO: 1 % — SIGNIFICANT CHANGE UP (ref 0–2)
BILIRUB SERPL-MCNC: 1 MG/DL — SIGNIFICANT CHANGE UP (ref 0.4–2)
BUN SERPL-MCNC: 27 MG/DL — HIGH (ref 8–20)
CALCIUM SERPL-MCNC: 10 MG/DL — SIGNIFICANT CHANGE UP (ref 8.6–10.2)
CHLORIDE SERPL-SCNC: 104 MMOL/L — SIGNIFICANT CHANGE UP (ref 98–107)
CO2 SERPL-SCNC: 17 MMOL/L — LOW (ref 22–29)
CREAT SERPL-MCNC: 1.29 MG/DL — SIGNIFICANT CHANGE UP (ref 0.5–1.3)
EOSINOPHIL # BLD AUTO: 0.38 K/UL — SIGNIFICANT CHANGE UP (ref 0–0.5)
EOSINOPHIL NFR BLD AUTO: 2.8 % — SIGNIFICANT CHANGE UP (ref 0–6)
GLUCOSE SERPL-MCNC: 247 MG/DL — HIGH (ref 70–115)
HCT VFR BLD CALC: 39.7 % — SIGNIFICANT CHANGE UP (ref 34.5–45)
HGB BLD-MCNC: 12.1 G/DL — SIGNIFICANT CHANGE UP (ref 11.5–15.5)
IMM GRANULOCYTES NFR BLD AUTO: 0.5 % — SIGNIFICANT CHANGE UP (ref 0–1.5)
INR BLD: 1.06 RATIO — SIGNIFICANT CHANGE UP (ref 0.88–1.16)
LYMPHOCYTES # BLD AUTO: 2.93 K/UL — SIGNIFICANT CHANGE UP (ref 1–3.3)
LYMPHOCYTES # BLD AUTO: 21.9 % — SIGNIFICANT CHANGE UP (ref 13–44)
MCHC RBC-ENTMCNC: 27.1 PG — SIGNIFICANT CHANGE UP (ref 27–34)
MCHC RBC-ENTMCNC: 30.5 GM/DL — LOW (ref 32–36)
MCV RBC AUTO: 88.8 FL — SIGNIFICANT CHANGE UP (ref 80–100)
MONOCYTES # BLD AUTO: 1.09 K/UL — HIGH (ref 0–0.9)
MONOCYTES NFR BLD AUTO: 8.1 % — SIGNIFICANT CHANGE UP (ref 2–14)
NEUTROPHILS # BLD AUTO: 8.78 K/UL — HIGH (ref 1.8–7.4)
NEUTROPHILS NFR BLD AUTO: 65.7 % — SIGNIFICANT CHANGE UP (ref 43–77)
NT-PROBNP SERPL-SCNC: 3590 PG/ML — HIGH (ref 0–300)
PLATELET # BLD AUTO: 331 K/UL — SIGNIFICANT CHANGE UP (ref 150–400)
POTASSIUM SERPL-MCNC: 4.6 MMOL/L — SIGNIFICANT CHANGE UP (ref 3.5–5.3)
POTASSIUM SERPL-SCNC: 4.6 MMOL/L — SIGNIFICANT CHANGE UP (ref 3.5–5.3)
PROT SERPL-MCNC: 7.6 G/DL — SIGNIFICANT CHANGE UP (ref 6.6–8.7)
PROTHROM AB SERPL-ACNC: 12.2 SEC — SIGNIFICANT CHANGE UP (ref 10–12.9)
RBC # BLD: 4.47 M/UL — SIGNIFICANT CHANGE UP (ref 3.8–5.2)
RBC # FLD: 17.8 % — HIGH (ref 10.3–14.5)
SODIUM SERPL-SCNC: 137 MMOL/L — SIGNIFICANT CHANGE UP (ref 135–145)
TROPONIN T SERPL-MCNC: <0.01 NG/ML — SIGNIFICANT CHANGE UP (ref 0–0.06)
WBC # BLD: 13.38 K/UL — HIGH (ref 3.8–10.5)
WBC # FLD AUTO: 13.38 K/UL — HIGH (ref 3.8–10.5)

## 2019-12-06 PROCEDURE — 93010 ELECTROCARDIOGRAM REPORT: CPT

## 2019-12-06 PROCEDURE — 99223 1ST HOSP IP/OBS HIGH 75: CPT

## 2019-12-06 PROCEDURE — 71045 X-RAY EXAM CHEST 1 VIEW: CPT | Mod: 26

## 2019-12-06 PROCEDURE — 99291 CRITICAL CARE FIRST HOUR: CPT

## 2019-12-06 PROCEDURE — 71250 CT THORAX DX C-: CPT | Mod: 26

## 2019-12-06 RX ORDER — ENOXAPARIN SODIUM 100 MG/ML
40 INJECTION SUBCUTANEOUS DAILY
Refills: 0 | Status: DISCONTINUED | OUTPATIENT
Start: 2019-12-06 | End: 2019-12-10

## 2019-12-06 RX ORDER — TRAMADOL HYDROCHLORIDE 50 MG/1
50 TABLET ORAL
Refills: 0 | Status: DISCONTINUED | OUTPATIENT
Start: 2019-12-06 | End: 2019-12-10

## 2019-12-06 RX ORDER — TICAGRELOR 90 MG/1
90 TABLET ORAL
Refills: 0 | Status: DISCONTINUED | OUTPATIENT
Start: 2019-12-06 | End: 2019-12-10

## 2019-12-06 RX ORDER — HYDRALAZINE HCL 50 MG
2 TABLET ORAL
Qty: 0 | Refills: 0 | DISCHARGE

## 2019-12-06 RX ORDER — IPRATROPIUM/ALBUTEROL SULFATE 18-103MCG
3 AEROSOL WITH ADAPTER (GRAM) INHALATION ONCE
Refills: 0 | Status: COMPLETED | OUTPATIENT
Start: 2019-12-06 | End: 2019-12-06

## 2019-12-06 RX ORDER — AZITHROMYCIN 500 MG/1
500 TABLET, FILM COATED ORAL ONCE
Refills: 0 | Status: COMPLETED | OUTPATIENT
Start: 2019-12-06 | End: 2019-12-06

## 2019-12-06 RX ORDER — SODIUM CHLORIDE 9 MG/ML
1000 INJECTION, SOLUTION INTRAVENOUS
Refills: 0 | Status: DISCONTINUED | OUTPATIENT
Start: 2019-12-06 | End: 2019-12-10

## 2019-12-06 RX ORDER — LEVOTHYROXINE SODIUM 125 MCG
200 TABLET ORAL DAILY
Refills: 0 | Status: DISCONTINUED | OUTPATIENT
Start: 2019-12-06 | End: 2019-12-10

## 2019-12-06 RX ORDER — GLUCAGON INJECTION, SOLUTION 0.5 MG/.1ML
1 INJECTION, SOLUTION SUBCUTANEOUS ONCE
Refills: 0 | Status: DISCONTINUED | OUTPATIENT
Start: 2019-12-06 | End: 2019-12-10

## 2019-12-06 RX ORDER — INSULIN LISPRO 100/ML
VIAL (ML) SUBCUTANEOUS
Refills: 0 | Status: DISCONTINUED | OUTPATIENT
Start: 2019-12-06 | End: 2019-12-10

## 2019-12-06 RX ORDER — CEFTRIAXONE 500 MG/1
1000 INJECTION, POWDER, FOR SOLUTION INTRAMUSCULAR; INTRAVENOUS ONCE
Refills: 0 | Status: COMPLETED | OUTPATIENT
Start: 2019-12-06 | End: 2019-12-06

## 2019-12-06 RX ORDER — METOPROLOL TARTRATE 50 MG
25 TABLET ORAL
Refills: 0 | Status: DISCONTINUED | OUTPATIENT
Start: 2019-12-06 | End: 2019-12-09

## 2019-12-06 RX ORDER — LIDOCAINE 4 G/100G
3 CREAM TOPICAL ONCE
Refills: 0 | Status: COMPLETED | OUTPATIENT
Start: 2019-12-06 | End: 2019-12-06

## 2019-12-06 RX ORDER — IPRATROPIUM/ALBUTEROL SULFATE 18-103MCG
3 AEROSOL WITH ADAPTER (GRAM) INHALATION EVERY 6 HOURS
Refills: 0 | Status: DISCONTINUED | OUTPATIENT
Start: 2019-12-06 | End: 2019-12-07

## 2019-12-06 RX ORDER — DEXTROSE 50 % IN WATER 50 %
25 SYRINGE (ML) INTRAVENOUS ONCE
Refills: 0 | Status: DISCONTINUED | OUTPATIENT
Start: 2019-12-06 | End: 2019-12-10

## 2019-12-06 RX ORDER — FUROSEMIDE 40 MG
20 TABLET ORAL DAILY
Refills: 0 | Status: DISCONTINUED | OUTPATIENT
Start: 2019-12-06 | End: 2019-12-06

## 2019-12-06 RX ORDER — ACETAMINOPHEN 500 MG
650 TABLET ORAL ONCE
Refills: 0 | Status: COMPLETED | OUTPATIENT
Start: 2019-12-06 | End: 2019-12-06

## 2019-12-06 RX ORDER — SACUBITRIL AND VALSARTAN 24; 26 MG/1; MG/1
1 TABLET, FILM COATED ORAL
Refills: 0 | Status: DISCONTINUED | OUTPATIENT
Start: 2019-12-06 | End: 2019-12-06

## 2019-12-06 RX ORDER — BUPROPION HYDROCHLORIDE 150 MG/1
1 TABLET, EXTENDED RELEASE ORAL
Qty: 0 | Refills: 0 | DISCHARGE

## 2019-12-06 RX ORDER — AZITHROMYCIN 500 MG/1
500 TABLET, FILM COATED ORAL EVERY 24 HOURS
Refills: 0 | Status: DISCONTINUED | OUTPATIENT
Start: 2019-12-06 | End: 2019-12-07

## 2019-12-06 RX ORDER — ASPIRIN/CALCIUM CARB/MAGNESIUM 324 MG
81 TABLET ORAL DAILY
Refills: 0 | Status: DISCONTINUED | OUTPATIENT
Start: 2019-12-06 | End: 2019-12-10

## 2019-12-06 RX ORDER — CEFTRIAXONE 500 MG/1
1000 INJECTION, POWDER, FOR SOLUTION INTRAMUSCULAR; INTRAVENOUS EVERY 24 HOURS
Refills: 0 | Status: DISCONTINUED | OUTPATIENT
Start: 2019-12-06 | End: 2019-12-08

## 2019-12-06 RX ORDER — FUROSEMIDE 40 MG
20 TABLET ORAL ONCE
Refills: 0 | Status: COMPLETED | OUTPATIENT
Start: 2019-12-06 | End: 2019-12-06

## 2019-12-06 RX ORDER — MAGNESIUM SULFATE 500 MG/ML
2 VIAL (ML) INJECTION ONCE
Refills: 0 | Status: COMPLETED | OUTPATIENT
Start: 2019-12-06 | End: 2019-12-06

## 2019-12-06 RX ORDER — TIOTROPIUM BROMIDE 18 UG/1
1 CAPSULE ORAL; RESPIRATORY (INHALATION) DAILY
Refills: 0 | Status: DISCONTINUED | OUTPATIENT
Start: 2019-12-06 | End: 2019-12-10

## 2019-12-06 RX ORDER — DEXTROSE 50 % IN WATER 50 %
15 SYRINGE (ML) INTRAVENOUS ONCE
Refills: 0 | Status: DISCONTINUED | OUTPATIENT
Start: 2019-12-06 | End: 2019-12-10

## 2019-12-06 RX ORDER — FUROSEMIDE 40 MG
40 TABLET ORAL
Refills: 0 | Status: DISCONTINUED | OUTPATIENT
Start: 2019-12-06 | End: 2019-12-08

## 2019-12-06 RX ORDER — DEXTROSE 50 % IN WATER 50 %
12.5 SYRINGE (ML) INTRAVENOUS ONCE
Refills: 0 | Status: DISCONTINUED | OUTPATIENT
Start: 2019-12-06 | End: 2019-12-10

## 2019-12-06 RX ORDER — SPIRONOLACTONE 25 MG/1
25 TABLET, FILM COATED ORAL DAILY
Refills: 0 | Status: DISCONTINUED | OUTPATIENT
Start: 2019-12-06 | End: 2019-12-10

## 2019-12-06 RX ORDER — ATORVASTATIN CALCIUM 80 MG/1
40 TABLET, FILM COATED ORAL AT BEDTIME
Refills: 0 | Status: DISCONTINUED | OUTPATIENT
Start: 2019-12-06 | End: 2019-12-10

## 2019-12-06 RX ADMIN — Medication 125 MILLIGRAM(S): at 09:11

## 2019-12-06 RX ADMIN — Medication 50 GRAM(S): at 09:10

## 2019-12-06 RX ADMIN — TICAGRELOR 90 MILLIGRAM(S): 90 TABLET ORAL at 17:52

## 2019-12-06 RX ADMIN — LIDOCAINE 3 PATCH: 4 CREAM TOPICAL at 20:42

## 2019-12-06 RX ADMIN — LIDOCAINE 3 PATCH: 4 CREAM TOPICAL at 23:14

## 2019-12-06 RX ADMIN — Medication 40 MILLIGRAM(S): at 17:53

## 2019-12-06 RX ADMIN — Medication 3 MILLILITER(S): at 11:14

## 2019-12-06 RX ADMIN — Medication 25 MILLIGRAM(S): at 17:53

## 2019-12-06 RX ADMIN — Medication 3 MILLILITER(S): at 20:43

## 2019-12-06 RX ADMIN — Medication 650 MILLIGRAM(S): at 12:22

## 2019-12-06 RX ADMIN — CEFTRIAXONE 1000 MILLIGRAM(S): 500 INJECTION, POWDER, FOR SOLUTION INTRAMUSCULAR; INTRAVENOUS at 11:45

## 2019-12-06 RX ADMIN — Medication 650 MILLIGRAM(S): at 11:27

## 2019-12-06 RX ADMIN — Medication 20 MILLIGRAM(S): at 13:00

## 2019-12-06 RX ADMIN — CEFTRIAXONE 100 MILLIGRAM(S): 500 INJECTION, POWDER, FOR SOLUTION INTRAMUSCULAR; INTRAVENOUS at 11:14

## 2019-12-06 RX ADMIN — Medication 3 MILLILITER(S): at 16:57

## 2019-12-06 RX ADMIN — ATORVASTATIN CALCIUM 40 MILLIGRAM(S): 80 TABLET, FILM COATED ORAL at 22:51

## 2019-12-06 RX ADMIN — AZITHROMYCIN 255 MILLIGRAM(S): 500 TABLET, FILM COATED ORAL at 11:13

## 2019-12-06 RX ADMIN — LIDOCAINE 3 PATCH: 4 CREAM TOPICAL at 11:27

## 2019-12-06 RX ADMIN — Medication 3 MILLILITER(S): at 11:13

## 2019-12-06 NOTE — H&P ADULT - NSICDXPASTMEDICALHX_GEN_ALL_CORE_FT
PAST MEDICAL HISTORY:  Anxiety     Carcinoma metastic stage 4 with stephanie to the iliac bone, colon, and lung    COPD (chronic obstructive pulmonary disease)     Hypertension     Hypothyroid     Iliac crest bone pain cancer, right

## 2019-12-06 NOTE — ED PROVIDER NOTE - ATTENDING CONTRIBUTION TO CARE
I, Broderick Bonner, performed a face to face bedside interview with this patient regarding history of present illness, review of symptoms and relevant past medical, social and family history.  I completed an independent physical examination. I have communicated the patient’s plan of care and disposition with the ACP.  74 year old female with PMh copd presents with SOB. States she has had congestion for a few days and woke from sleep this morning with severe SOB. Found to by hypoxic and in resp distress by EMS, placed on cpap  Gen: NAD, well appearing  CV: RRR  Pul: wheezing on left and rhonchi on R  Abd: Soft, non-distended, non-tender  Neuro: no focal deficits  Pt in resp distress, initially placed on bipap and improved significantly, titrated down to NC, toleratign well and maintaining sats, RLL pna, will admit for copd exacerbation and pna

## 2019-12-06 NOTE — H&P ADULT - HISTORY OF PRESENT ILLNESS
73 y/o female with h/o COPD, lung cancer 4 years ago s/p chemotherapy and Immuno therapy, Mitral valve regurgitation,, CAD and stents, hypothyroidism, woke up from sleep with chest tightness and difficulty to breath. minimal dry cough. no wheezing. no fever.  chest pain or palpitations. no orthopnea or LE edema. no chest pain. patient improved in the ER on Bi pap and duoneb. o/e: no wheezing with very good air entry but B/L basal crackles. CXR showed ? pulmonary congestion. Labs showed BNP: 3590. Patient denies h/o CHF. WBC: 13 000. Glu: 247. no h/o DM.

## 2019-12-06 NOTE — ED ADULT NURSE NOTE - OBJECTIVE STATEMENT
Pt with URI like symptoms over the last few days and woke up with difficulty breathing this morning, pt c/o tightness in chest/abd and denies at this time, pt placed on cpap prior to arrival and switched to bipap upon arrival, pt rec'd duoneb prior to arrival

## 2019-12-06 NOTE — ED PROVIDER NOTE - PHYSICAL EXAMINATION
received pt on CPAP by EMS, while on CPAP pt still noted to have increased WOB, tripoding, tachypneic, unable to speak in full sentences,

## 2019-12-06 NOTE — CHART NOTE - NSCHARTNOTEFT_GEN_A_CORE
case was discussed with Dr. ugalde, cardio who advised that patient has Ischemic cardiomyopathy and will need aggressive diuresis. Hold Entresto and give lasix 40 mg iv BID x 2 days

## 2019-12-06 NOTE — ED ADULT NURSE NOTE - NSIMPLEMENTINTERV_GEN_ALL_ED
Implemented All Universal Safety Interventions:  [place] to call system. Call bell, personal items and telephone within reach. Instruct patient to call for assistance. Room bathroom lighting operational. Non-slip footwear when patient is off stretcher. Physically safe environment: no spills, clutter or unnecessary equipment. Stretcher in lowest position, wheels locked, appropriate side rails in place.

## 2019-12-06 NOTE — ED PROVIDER NOTE - CARE PLAN
Principal Discharge DX:	Acute respiratory failure with hypoxia  Secondary Diagnosis:	Community acquired pneumonia  Secondary Diagnosis:	COPD exacerbation

## 2019-12-06 NOTE — ED PROVIDER NOTE - OBJECTIVE STATEMENT
75 y/o F with PMHx COPD, lungs CA (in remission), HTN, HLD, cardiac stent (feb 2019) presents to ED BIBA on CPAP for SOB.  As per EMS patient was dyspneic with Spo2 85%. Pt reports she noticed productive cough with clear mucus and increased SOB yesterday along with "tightness" in her abdomen which resolved after eating. Last night slept on and off due to SOB and woke up severely SOB at 6 am and called EMS. Patient did not take nebs at home. Patient has home O2 3L at night and as needed. States when she woke up she also had pain in her upper back. Denies current chest pain, abdominal pain, nausea, vomiting, diarrhea. Pt palce don BiPAP 100% upon arrival to ED with improved WOB.      PCP: Dr Pierce

## 2019-12-06 NOTE — ED PROVIDER NOTE - PROGRESS NOTE DETAILS
Bonner: Pt titrated off bipap and tolerating well on 3L NC. Will admit for pna and copd exacerbation

## 2019-12-06 NOTE — ED PROVIDER NOTE - CRITICAL CARE ATTESTATION
Left message for return call   I have personally provided the amount of critical care time documented below concurrently with the resident/fellow.  This time excludes time spent on separate procedures and time spent teaching. I have reviewed the resident’s/fellow’s documentation and I agree with the assessment and plan of care

## 2019-12-06 NOTE — ED PROVIDER NOTE - CLINICAL SUMMARY MEDICAL DECISION MAKING FREE TEXT BOX
75 y/o F with PMH COPD, cardiac stent with resp distress, on CPAP, pt with decreased WOB when on CPAP; likely COPD exacerbation vs pneumonia, plan labs, EKG, CXR, neb, mag and steroids, and reassess, likely admit

## 2019-12-06 NOTE — ED PROVIDER NOTE - PMH
Anxiety    Carcinoma  metastic stage 4 with stephanie to the iliac bone, colon, and lung  COPD (chronic obstructive pulmonary disease)    Hypertension    Hypothyroid    Iliac crest bone pain  cancer, right

## 2019-12-06 NOTE — ED ADULT TRIAGE NOTE - CHIEF COMPLAINT QUOTE
Pt reports waking up with difficulty breathing, c/o chest tightness, hx of COPD, pt arrived on CPAP, pt rec'd duoneb prior to arrival, MD Bonner called to bedside

## 2019-12-06 NOTE — ED ADULT NURSE REASSESSMENT NOTE - NS ED NURSE REASSESS COMMENT FT1
hospitalist at bedside for admission
received pt from critical care. pt a&ox3, denies any pain/discomfort. tolerating current bipap settings. states feeling better. meds as rxd, pending abg and cxr. daughter at bedside. updated on plan of care, verbalize understanding. call bell in reach
pt back from ct, no s/s of acute distress, pending ct read.

## 2019-12-07 LAB
ALBUMIN SERPL ELPH-MCNC: 4.2 G/DL — SIGNIFICANT CHANGE UP (ref 3.3–5.2)
ALP SERPL-CCNC: 78 U/L — SIGNIFICANT CHANGE UP (ref 40–120)
ALT FLD-CCNC: 13 U/L — SIGNIFICANT CHANGE UP
ANION GAP SERPL CALC-SCNC: 17 MMOL/L — SIGNIFICANT CHANGE UP (ref 5–17)
AST SERPL-CCNC: 14 U/L — SIGNIFICANT CHANGE UP
BASOPHILS # BLD AUTO: 0.03 K/UL — SIGNIFICANT CHANGE UP (ref 0–0.2)
BASOPHILS NFR BLD AUTO: 0.2 % — SIGNIFICANT CHANGE UP (ref 0–2)
BILIRUB SERPL-MCNC: 1.2 MG/DL — SIGNIFICANT CHANGE UP (ref 0.4–2)
BUN SERPL-MCNC: 23 MG/DL — HIGH (ref 8–20)
CALCIUM SERPL-MCNC: 10 MG/DL — SIGNIFICANT CHANGE UP (ref 8.6–10.2)
CHLORIDE SERPL-SCNC: 105 MMOL/L — SIGNIFICANT CHANGE UP (ref 98–107)
CO2 SERPL-SCNC: 20 MMOL/L — LOW (ref 22–29)
CREAT SERPL-MCNC: 1.03 MG/DL — SIGNIFICANT CHANGE UP (ref 0.5–1.3)
EOSINOPHIL # BLD AUTO: 0.01 K/UL — SIGNIFICANT CHANGE UP (ref 0–0.5)
EOSINOPHIL NFR BLD AUTO: 0.1 % — SIGNIFICANT CHANGE UP (ref 0–6)
GLUCOSE BLDC GLUCOMTR-MCNC: 88 MG/DL — SIGNIFICANT CHANGE UP (ref 70–99)
GLUCOSE BLDC GLUCOMTR-MCNC: 89 MG/DL — SIGNIFICANT CHANGE UP (ref 70–99)
GLUCOSE BLDC GLUCOMTR-MCNC: 99 MG/DL — SIGNIFICANT CHANGE UP (ref 70–99)
GLUCOSE SERPL-MCNC: 112 MG/DL — SIGNIFICANT CHANGE UP (ref 70–115)
HBA1C BLD-MCNC: 5.7 % — HIGH (ref 4–5.6)
HCT VFR BLD CALC: 36.8 % — SIGNIFICANT CHANGE UP (ref 34.5–45)
HGB BLD-MCNC: 11.5 G/DL — SIGNIFICANT CHANGE UP (ref 11.5–15.5)
IMM GRANULOCYTES NFR BLD AUTO: 0.6 % — SIGNIFICANT CHANGE UP (ref 0–1.5)
LYMPHOCYTES # BLD AUTO: 0.6 K/UL — LOW (ref 1–3.3)
LYMPHOCYTES # BLD AUTO: 4.6 % — LOW (ref 13–44)
MAGNESIUM SERPL-MCNC: 2.2 MG/DL — SIGNIFICANT CHANGE UP (ref 1.6–2.6)
MCHC RBC-ENTMCNC: 27.3 PG — SIGNIFICANT CHANGE UP (ref 27–34)
MCHC RBC-ENTMCNC: 31.3 GM/DL — LOW (ref 32–36)
MCV RBC AUTO: 87.2 FL — SIGNIFICANT CHANGE UP (ref 80–100)
MONOCYTES # BLD AUTO: 1.17 K/UL — HIGH (ref 0–0.9)
MONOCYTES NFR BLD AUTO: 8.9 % — SIGNIFICANT CHANGE UP (ref 2–14)
NEUTROPHILS # BLD AUTO: 11.26 K/UL — HIGH (ref 1.8–7.4)
NEUTROPHILS NFR BLD AUTO: 85.6 % — HIGH (ref 43–77)
PLATELET # BLD AUTO: 261 K/UL — SIGNIFICANT CHANGE UP (ref 150–400)
POTASSIUM SERPL-MCNC: 3.5 MMOL/L — SIGNIFICANT CHANGE UP (ref 3.5–5.3)
POTASSIUM SERPL-SCNC: 3.5 MMOL/L — SIGNIFICANT CHANGE UP (ref 3.5–5.3)
PROT SERPL-MCNC: 7.6 G/DL — SIGNIFICANT CHANGE UP (ref 6.6–8.7)
RBC # BLD: 4.22 M/UL — SIGNIFICANT CHANGE UP (ref 3.8–5.2)
RBC # FLD: 17.7 % — HIGH (ref 10.3–14.5)
SODIUM SERPL-SCNC: 142 MMOL/L — SIGNIFICANT CHANGE UP (ref 135–145)
WBC # BLD: 13.15 K/UL — HIGH (ref 3.8–10.5)
WBC # FLD AUTO: 13.15 K/UL — HIGH (ref 3.8–10.5)

## 2019-12-07 PROCEDURE — 99233 SBSQ HOSP IP/OBS HIGH 50: CPT

## 2019-12-07 RX ORDER — IPRATROPIUM/ALBUTEROL SULFATE 18-103MCG
3 AEROSOL WITH ADAPTER (GRAM) INHALATION EVERY 6 HOURS
Refills: 0 | Status: DISCONTINUED | OUTPATIENT
Start: 2019-12-07 | End: 2019-12-10

## 2019-12-07 RX ORDER — AZITHROMYCIN 500 MG/1
250 TABLET, FILM COATED ORAL DAILY
Refills: 0 | Status: DISCONTINUED | OUTPATIENT
Start: 2019-12-07 | End: 2019-12-08

## 2019-12-07 RX ADMIN — Medication 25 MILLIGRAM(S): at 05:46

## 2019-12-07 RX ADMIN — TICAGRELOR 90 MILLIGRAM(S): 90 TABLET ORAL at 17:41

## 2019-12-07 RX ADMIN — Medication 81 MILLIGRAM(S): at 05:46

## 2019-12-07 RX ADMIN — Medication 3 MILLILITER(S): at 09:53

## 2019-12-07 RX ADMIN — CEFTRIAXONE 100 MILLIGRAM(S): 500 INJECTION, POWDER, FOR SOLUTION INTRAMUSCULAR; INTRAVENOUS at 11:10

## 2019-12-07 RX ADMIN — Medication 40 MILLIGRAM(S): at 05:45

## 2019-12-07 RX ADMIN — ATORVASTATIN CALCIUM 40 MILLIGRAM(S): 80 TABLET, FILM COATED ORAL at 21:29

## 2019-12-07 RX ADMIN — Medication 25 MILLIGRAM(S): at 17:40

## 2019-12-07 RX ADMIN — TIOTROPIUM BROMIDE 1 CAPSULE(S): 18 CAPSULE ORAL; RESPIRATORY (INHALATION) at 09:56

## 2019-12-07 RX ADMIN — Medication 40 MILLIGRAM(S): at 17:40

## 2019-12-07 RX ADMIN — TICAGRELOR 90 MILLIGRAM(S): 90 TABLET ORAL at 05:46

## 2019-12-07 RX ADMIN — Medication 3 MILLILITER(S): at 03:47

## 2019-12-07 RX ADMIN — SPIRONOLACTONE 25 MILLIGRAM(S): 25 TABLET, FILM COATED ORAL at 05:46

## 2019-12-07 RX ADMIN — ENOXAPARIN SODIUM 40 MILLIGRAM(S): 100 INJECTION SUBCUTANEOUS at 08:46

## 2019-12-07 RX ADMIN — Medication 200 MICROGRAM(S): at 08:46

## 2019-12-07 NOTE — PROGRESS NOTE ADULT - SUBJECTIVE AND OBJECTIVE BOX
[place] [facility] - [facility], THE HEART CENTER                                   [address] [place] [address]                                                      PHONE: [phone]                                                         FAX: [phone]  [email or web address]  ---------------------------------------------------------------------------------------------------------------------------------    Reason for Consult: chf    HPI    Hx of Lung Ca with mets , CAD S/P NSTEMI S/P PCI of LCX ischemic CMP LVEF 30% c/o SOB VILLARREAL ORTHOPNEA for the last few days found with evidence of acute on chronic CHF    PAST MEDICAL & SURGICAL HISTORY:  COPD (chronic obstructive pulmonary disease)  Carcinoma: metastic stage 4 with stephanie to the iliac bone, colon, and lung  Iliac crest bone pain: cancer, right  Anxiety  Hypothyroid  Hypertension  S/P cardiac catheterization: stent placed february 2019    RECENT EVENTS    SOB still present had improved  CT of chest bilateral Effusions    No Known Allergies      MEDICATIONS  (STANDING):  albuterol/ipratropium for Nebulization 3 milliLiter(s) Nebulizer every 6 hours  aspirin  chewable 81 milliGRAM(s) Oral daily  atorvastatin 40 milliGRAM(s) Oral at bedtime  azithromycin  IVPB 500 milliGRAM(s) IV Intermittent every 24 hours  cefTRIAXone   IVPB 1000 milliGRAM(s) IV Intermittent every 24 hours  dextrose 5%. 1000 milliLiter(s) (50 mL/Hr) IV Continuous <Continuous>  dextrose 50% Injectable 12.5 Gram(s) IV Push once  dextrose 50% Injectable 25 Gram(s) IV Push once  dextrose 50% Injectable 25 Gram(s) IV Push once  enoxaparin Injectable 40 milliGRAM(s) SubCutaneous daily  furosemide   Injectable 40 milliGRAM(s) IV Push two times a day  insulin lispro (HumaLOG) corrective regimen sliding scale   SubCutaneous three times a day before meals  levothyroxine 200 MICROGram(s) Oral daily  metoprolol tartrate 25 milliGRAM(s) Oral two times a day  spironolactone 25 milliGRAM(s) Oral daily  ticagrelor 90 milliGRAM(s) Oral two times a day  tiotropium 18 MICROgram(s) Capsule 1 Capsule(s) Inhalation daily    MEDICATIONS  (PRN):  dextrose 40% Gel 15 Gram(s) Oral once PRN Blood Glucose LESS THAN 70 milliGRAM(s)/deciliter  glucagon  Injectable 1 milliGRAM(s) IntraMuscular once PRN Glucose LESS THAN 70 milligrams/deciliter  traMADol 50 milliGRAM(s) Oral two times a day PRN Moderate Pain (4 - 6)      Social History:  Cigarettes:                    Alchohol:                 Illicit Drug Abuse:      REVIEW OF SYSTEMS:    Constitutional: No fever, weight loss or fatigue  Eyes: No eye pain, visual disturbances, or discharge  ENMT:  No difficulty hearing, tinnitus, vertigo; No sinus or throat pain  Neck: No pain or stiffness  Respiratory: No cough, wheezing, chills or hemoptysis  Cardiovascular: No chest pain, palpitations, shortness of breath, dizziness or leg swelling  Gastrointestinal: No abdominal or epigastric pain. No nausea, vomiting or hematemesis; No diarrhea or constipation. No melena or hematochezia.  Genitourinary: No dysuria, frequency, hematuria or incontinence  Rectal: No pain, hemorrhoids or incontinence  Neurological: No headaches, memory loss, loss of strength, numbness or tremors  Skin: No itching, burning, rashes or lesions   Lymph Nodes: No enlarged glands  Endocrine: No heat or cold intolerance; No hair loss  Musculoskeletal: No joint pain or swelling; No muscle, back or extremity pain  Psychiatric: No depression, anxiety, mood swings or difficulty sleeping  Heme/Lymph: No easy bruising or bleeding gums  Allergy and Immunologic: No hives or eczema    Vital Signs Last 24 Hrs  T(C): 36.7 (06 Dec 2019 11:16), Max: 36.7 (06 Dec 2019 11:16)  T(F): 98 (06 Dec 2019 11:16), Max: 98 (06 Dec 2019 11:16)  HR: 63 (06 Dec 2019 12:22) (63 - 97)  BP: 133/63 (06 Dec 2019 12:22) (129/61 - 174/92)  BP(mean): --  RR: 18 (06 Dec 2019 12:22) (18 - 36)  SpO2: 95% (06 Dec 2019 12:22) (89% - 100%)  ICU Vital Signs Last 24 Hrs  JOEY CHRISTOPHER  I&O's Detail    I&O's Summary    Drug Dosing Weight  JOEY CHRISTOPHER      PHYSICAL EXAM:  General: Appears well developed, well nourished alert and cooperative.  HEENT: Head; normocephalic, atraumatic.  Eyes: Pupils reactive, cornea wnl.  Neck: Supple, no nodes adenopathy, no NVD or carotid bruit or thyromegaly.  CARDIOVASCULAR: Normal S1 and S2, No murmur, rub, gallop or lift.   LUNGS: No rales, rhonchi or wheeze. Normal breath sounds bilaterally.  ABDOMEN: Soft, nontender without mass or organomegaly. bowel sounds normoactive.  EXTREMITIES: No clubbing, cyanosis or edema. Distal pulses wnl.   SKIN: warm and dry with normal turgor.  NEURO: Alert/oriented x 3/normal motor exam. No pathologic reflexes.    PSYCH: normal affect.        LABS:                        12.1   13.38 )-----------( 331      ( 06 Dec 2019 08:21 )             39.7     12-06    137  |  104  |  27.0<H>  ----------------------------<  247<H>  4.6   |  17.0<L>  |  1.29    Ca    10.0      06 Dec 2019 08:21    TPro  7.6  /  Alb  4.1  /  TBili  1.0  /  DBili  x   /  AST  21  /  ALT  15  /  AlkPhos  96  12-06    JOEY CHRISTOPHER  CARDIAC MARKERS ( 06 Dec 2019 08:21 )  x     / <0.01 ng/mL / x     / x     / x          PT/INR - ( 06 Dec 2019 08:21 )   PT: 12.2 sec;   INR: 1.06 ratio         PTT - ( 06 Dec 2019 08:21 )  PTT:26.7 sec      RADIOLOGY & ADDITIONAL STUDIES:    INTERPRETATION OF TELEMETRY (personally reviewed):    ECG: < from: 12 Lead ECG (12.06.19 @ 08:21) >  Ventricular Rate 83 BPM    Atrial Rate 83 BPM    P-R Interval 190 ms    QRS Duration 106 ms    Q-T Interval 404 ms    QTC Calculation(Bezet) 474 ms    P Axis 85 degrees    R Axis -16 degrees    T Axis 87 degrees    Diagnosis Line Normal sinus rhythm  Possible Inferior infarct , age undetermined  Abnormal ECG    Confirmed by Gm Russell ([id number]) on 12/6/2019 3:38:34 PM    < end of copied text >      ECHO: < from: TTE Echo Complete w/Doppler (02.12.19 @ 09:05) >  PHYSICIAN INTERPRETATION:  Left Ventricle: The left ventricular internal cavity size is normal.  Left ventricular ejection fraction, by visual estimation, is 25 to 30%.       LV Wall Scoring:  The basal and mid anterolateral wall, mid anteroseptal segment, basal  inferoseptal segment, basal inferolateral segment, apical lateral   segment, and  basal inferior segment are akinetic.    Right Ventricle: The right ventricular size is normal.  Pericardium: A small pericardial effusion is present. The pericardial   effusion is anterior to the right ventricle.  Mitral Valve: Moderate to severe mitral valve regurgitation is seen.  Tricuspid Valve: Adequate TR velocity was not obtained to accurately   assess RVSP.  Venous: The inferior vena cava was dilated, with respiratory size   variation less than 50%.       Summary:   1. Left ventricular ejection fraction, by visual estimation, is 25 to   30%.   2. Technically difficult study.   3. Multiple left ventricular regional wall motion abnormalities exist.   See wall motion findings.   4. Small pericardial effusion.    [id number] Antony Hoyos MD, Electronically signed on 2/12/2019 at 3:39:35 PM       < end of copied text >      STRESS TEST:    CARDIAC CATHETERIZATION:< from: Cardiac Cath Lab - Adult (02.08.19 @ 18:08) >    < from: Cardiac Cath Lab - Adult (02.08.19 @ 18:08) >  INTERVENTIONAL IMPRESSIONS: Acute occlusion of proximal LCX and  of RCA  with left to right collaterals. The LCX was treated with  thrombectomy/PTCA/STENT (OSMAR-Resolute) with good result.  INTERVENTIONAL RECOMMENDATIONS: ASA and Brilinta  Prepared and signed by  Hipolito Avila MD    < end of copied text >            ACTIVE PROBLEMS:  HEALTH ISSUES - PROBLEM Dx:  Type 2 diabetes mellitus with other specified complication, without long-term current use of insulin: Type 2 diabetes mellitus with other specified complication, without long-term current use of insulin  Hypothyroidism, unspecified type: Hypothyroidism, unspecified type  Coronary artery disease involving native coronary artery of native heart without angina pectoris: Coronary artery disease involving native coronary artery of native heart without angina pectoris  History of lung cancer: History of lung cancer  Chronic obstructive pulmonary disease, unspecified COPD type: Chronic obstructive pulmonary disease, unspecified COPD type  Acute on chronic systolic congestive heart failure: Acute on chronic systolic congestive heart failure          Assessment and Plan:      Telemetry monitoring  IV diuresis improving  2DEchocardiogram MR LVEF eval  Hold entresto for now  Discussed with  at bedside in detail    ESEQUIEL HOYOS MD, FACC, [place]

## 2019-12-07 NOTE — PROGRESS NOTE ADULT - ASSESSMENT
74 yr old female with COPD, lung ca s/p chemotherapy and immunotherapy, CAD, CHF, hypothyroid admitted with complaints of shortness of breath for few days, required transient BiPAP in ED. Noted to have pleural effusions on imaging, with elevated BNP. She was started on IV Lasix, cardiology was consulted, ECHO was ordered. Empiric antibiotics given for bronchitis.

## 2019-12-07 NOTE — PROGRESS NOTE ADULT - SUBJECTIVE AND OBJECTIVE BOX
INTERVAL HPI/OVERNIGHT EVENTS:    CC: CHF exacerbation, hypothyroid    Chart and events noted, no overnight events, denies shortness of breath    Vital Signs Last 24 Hrs  T(C): 36.7 (07 Dec 2019 15:45), Max: 36.8 (06 Dec 2019 18:15)  T(F): 98 (07 Dec 2019 15:45), Max: 98.3 (06 Dec 2019 18:15)  HR: 56 (07 Dec 2019 15:45) (56 - 70)  BP: 113/61 (07 Dec 2019 15:45) (113/61 - 147/77)  BP(mean): --  RR: 17 (07 Dec 2019 15:45) (17 - 19)  SpO2: 96% (07 Dec 2019 15:45) (96% - 99%)    PHYSICAL EXAM:    GENERAL: Not in distress, alert  CHEST/LUNG: b/l air entry  HEART: Regular   ABDOMEN: Soft, BS+  EXTREMITIES:  no edema, tenderness    MEDICATIONS  (STANDING):  aspirin  chewable 81 milliGRAM(s) Oral daily  atorvastatin 40 milliGRAM(s) Oral at bedtime  azithromycin  IVPB 500 milliGRAM(s) IV Intermittent every 24 hours  cefTRIAXone   IVPB 1000 milliGRAM(s) IV Intermittent every 24 hours  dextrose 5%. 1000 milliLiter(s) (50 mL/Hr) IV Continuous <Continuous>  dextrose 50% Injectable 12.5 Gram(s) IV Push once  dextrose 50% Injectable 25 Gram(s) IV Push once  dextrose 50% Injectable 25 Gram(s) IV Push once  enoxaparin Injectable 40 milliGRAM(s) SubCutaneous daily  furosemide   Injectable 40 milliGRAM(s) IV Push two times a day  insulin lispro (HumaLOG) corrective regimen sliding scale   SubCutaneous three times a day before meals  levothyroxine 200 MICROGram(s) Oral daily  metoprolol tartrate 25 milliGRAM(s) Oral two times a day  spironolactone 25 milliGRAM(s) Oral daily  ticagrelor 90 milliGRAM(s) Oral two times a day  tiotropium 18 MICROgram(s) Capsule 1 Capsule(s) Inhalation daily    MEDICATIONS  (PRN):  albuterol/ipratropium for Nebulization. 3 milliLiter(s) Nebulizer every 6 hours PRN Shortness of Breath  dextrose 40% Gel 15 Gram(s) Oral once PRN Blood Glucose LESS THAN 70 milliGRAM(s)/deciliter  glucagon  Injectable 1 milliGRAM(s) IntraMuscular once PRN Glucose LESS THAN 70 milligrams/deciliter  traMADol 50 milliGRAM(s) Oral two times a day PRN Moderate Pain (4 - 6)      Allergies    No Known Allergies    Intolerances          LABS:                          11.5   13.15 )-----------( 261      ( 07 Dec 2019 07:25 )             36.8     12-07    142  |  105  |  23.0<H>  ----------------------------<  112  3.5   |  20.0<L>  |  1.03    Ca    10.0      07 Dec 2019 07:25  Mg     2.2     12-07    TPro  7.6  /  Alb  4.2  /  TBili  1.2  /  DBili  x   /  AST  14  /  ALT  13  /  AlkPhos  78  12-07    PT/INR - ( 06 Dec 2019 08:21 )   PT: 12.2 sec;   INR: 1.06 ratio         PTT - ( 06 Dec 2019 08:21 )  PTT:26.7 sec      RADIOLOGY & ADDITIONAL TESTS:

## 2019-12-07 NOTE — PATIENT PROFILE ADULT - VISION (WITH CORRECTIVE LENSES IF THE PATIENT USUALLY WEARS THEM):
Pt diagnosed with the \"flu\" 3 days ago and placed on a Zpack, saw PCP yesterday and changed med to Levaquin, took a 2nd dose tonight accidentally, at 2130, 1st dose was noon.  Wife concerned with any side effects.  Wife warm transferred to Poison Control and PCP on call paged to advise next dose of Levaquin. Dr Busch returned page and advised next dose of Levaquin should be tomorrow night and continue once at night the rest of the antibiotic course.  Wife called and advised of this and reports pt. Not c/o any S/S.  Wife with no further concerns.     Reason for Disposition  • Triager unable to answer question    Protocols used: POISONING-A-AH       Partially impaired: cannot see medication labels or newsprint, but can see obstacles in path, and the surrounding layout; can count fingers at arm's length

## 2019-12-08 LAB
ANION GAP SERPL CALC-SCNC: 16 MMOL/L — SIGNIFICANT CHANGE UP (ref 5–17)
BUN SERPL-MCNC: 32 MG/DL — HIGH (ref 8–20)
CALCIUM SERPL-MCNC: 9.6 MG/DL — SIGNIFICANT CHANGE UP (ref 8.6–10.2)
CHLORIDE SERPL-SCNC: 103 MMOL/L — SIGNIFICANT CHANGE UP (ref 98–107)
CO2 SERPL-SCNC: 25 MMOL/L — SIGNIFICANT CHANGE UP (ref 22–29)
CREAT SERPL-MCNC: 1.39 MG/DL — HIGH (ref 0.5–1.3)
GLUCOSE BLDC GLUCOMTR-MCNC: 83 MG/DL — SIGNIFICANT CHANGE UP (ref 70–99)
GLUCOSE BLDC GLUCOMTR-MCNC: 85 MG/DL — SIGNIFICANT CHANGE UP (ref 70–99)
GLUCOSE BLDC GLUCOMTR-MCNC: 88 MG/DL — SIGNIFICANT CHANGE UP (ref 70–99)
GLUCOSE BLDC GLUCOMTR-MCNC: 90 MG/DL — SIGNIFICANT CHANGE UP (ref 70–99)
GLUCOSE SERPL-MCNC: 86 MG/DL — SIGNIFICANT CHANGE UP (ref 70–115)
HCT VFR BLD CALC: 37 % — SIGNIFICANT CHANGE UP (ref 34.5–45)
HCV AB S/CO SERPL IA: 0.13 S/CO — SIGNIFICANT CHANGE UP (ref 0–0.99)
HCV AB SERPL-IMP: SIGNIFICANT CHANGE UP
HGB BLD-MCNC: 11.4 G/DL — LOW (ref 11.5–15.5)
MAGNESIUM SERPL-MCNC: 2.2 MG/DL — SIGNIFICANT CHANGE UP (ref 1.6–2.6)
MCHC RBC-ENTMCNC: 27.2 PG — SIGNIFICANT CHANGE UP (ref 27–34)
MCHC RBC-ENTMCNC: 30.8 GM/DL — LOW (ref 32–36)
MCV RBC AUTO: 88.3 FL — SIGNIFICANT CHANGE UP (ref 80–100)
PLATELET # BLD AUTO: 261 K/UL — SIGNIFICANT CHANGE UP (ref 150–400)
POTASSIUM SERPL-MCNC: 4 MMOL/L — SIGNIFICANT CHANGE UP (ref 3.5–5.3)
POTASSIUM SERPL-SCNC: 4 MMOL/L — SIGNIFICANT CHANGE UP (ref 3.5–5.3)
RBC # BLD: 4.19 M/UL — SIGNIFICANT CHANGE UP (ref 3.8–5.2)
RBC # FLD: 18 % — HIGH (ref 10.3–14.5)
SODIUM SERPL-SCNC: 144 MMOL/L — SIGNIFICANT CHANGE UP (ref 135–145)
WBC # BLD: 8.32 K/UL — SIGNIFICANT CHANGE UP (ref 3.8–10.5)
WBC # FLD AUTO: 8.32 K/UL — SIGNIFICANT CHANGE UP (ref 3.8–10.5)

## 2019-12-08 PROCEDURE — 93306 TTE W/DOPPLER COMPLETE: CPT | Mod: 26

## 2019-12-08 PROCEDURE — 99233 SBSQ HOSP IP/OBS HIGH 50: CPT

## 2019-12-08 RX ORDER — FUROSEMIDE 40 MG
40 TABLET ORAL DAILY
Refills: 0 | Status: DISCONTINUED | OUTPATIENT
Start: 2019-12-08 | End: 2019-12-09

## 2019-12-08 RX ADMIN — Medication 40 MILLIGRAM(S): at 05:32

## 2019-12-08 RX ADMIN — Medication 25 MILLIGRAM(S): at 05:32

## 2019-12-08 RX ADMIN — ENOXAPARIN SODIUM 40 MILLIGRAM(S): 100 INJECTION SUBCUTANEOUS at 21:41

## 2019-12-08 RX ADMIN — CEFTRIAXONE 100 MILLIGRAM(S): 500 INJECTION, POWDER, FOR SOLUTION INTRAMUSCULAR; INTRAVENOUS at 09:33

## 2019-12-08 RX ADMIN — Medication 200 MICROGRAM(S): at 05:32

## 2019-12-08 RX ADMIN — SPIRONOLACTONE 25 MILLIGRAM(S): 25 TABLET, FILM COATED ORAL at 05:32

## 2019-12-08 RX ADMIN — TIOTROPIUM BROMIDE 1 CAPSULE(S): 18 CAPSULE ORAL; RESPIRATORY (INHALATION) at 09:20

## 2019-12-08 RX ADMIN — Medication 81 MILLIGRAM(S): at 09:33

## 2019-12-08 RX ADMIN — TICAGRELOR 90 MILLIGRAM(S): 90 TABLET ORAL at 17:19

## 2019-12-08 RX ADMIN — TICAGRELOR 90 MILLIGRAM(S): 90 TABLET ORAL at 05:32

## 2019-12-08 RX ADMIN — ATORVASTATIN CALCIUM 40 MILLIGRAM(S): 80 TABLET, FILM COATED ORAL at 21:41

## 2019-12-08 RX ADMIN — AZITHROMYCIN 250 MILLIGRAM(S): 500 TABLET, FILM COATED ORAL at 09:33

## 2019-12-08 NOTE — PROGRESS NOTE ADULT - ASSESSMENT
74 yr old female with COPD, lung ca s/p chemotherapy and immunotherapy, CAD, CHF, hypothyroid admitted with complaints of shortness of breath for few days, required transient BiPAP in ED. Noted to have pleural effusions on imaging, with elevated BNP. She was started on IV Lasix, cardiology was consulted, ECHO was ordered. Empiric antibiotics given for bronchitis. She was noted to have mild elevation in creatinine. Lasix dose was adjusted.

## 2019-12-08 NOTE — PROGRESS NOTE ADULT - SUBJECTIVE AND OBJECTIVE BOX
[place] [facility] - [facility], THE [facility]ART CENTER                                   [address] [place] [address]                                                      PHONE: [phone]                                                         FAX: [phone]  [email or web address]  ---------------------------------------------------------------------------------------------------------------------------------    Reason for Consult: chf    HPI    Hx of Lung Ca with mets , CAD S/P NSTEMI S/P PCI of LCX ischemic CMP LVEF 30% c/o SOB VILLARREAL ORTHOPNEA for the last few days found with evidence of acute on chronic CHF  slowly improving      PAST MEDICAL & SURGICAL HISTORY:  COPD (chronic obstructive pulmonary disease)  Carcinoma: metastic stage 4 with stephanie to the iliac bone, colon, and lung  Iliac crest bone pain: cancer, right  Anxiety  Hypothyroid  Hypertension  S/P cardiac catheterization: stent placed february 2019    RECENT EVENTS    SOB still present had improved overnight  CT of chest bilateral Effusions    No Known Allergies      MEDICATIONS  (STANDING):  albuterol/ipratropium for Nebulization 3 milliLiter(s) Nebulizer every 6 hours  aspirin  chewable 81 milliGRAM(s) Oral daily  atorvastatin 40 milliGRAM(s) Oral at bedtime  azithromycin  IVPB 500 milliGRAM(s) IV Intermittent every 24 hours  cefTRIAXone   IVPB 1000 milliGRAM(s) IV Intermittent every 24 hours  dextrose 5%. 1000 milliLiter(s) (50 mL/Hr) IV Continuous <Continuous>  dextrose 50% Injectable 12.5 Gram(s) IV Push once  dextrose 50% Injectable 25 Gram(s) IV Push once  dextrose 50% Injectable 25 Gram(s) IV Push once  enoxaparin Injectable 40 milliGRAM(s) SubCutaneous daily  furosemide   Injectable 40 milliGRAM(s) IV Push two times a day  insulin lispro (HumaLOG) corrective regimen sliding scale   SubCutaneous three times a day before meals  levothyroxine 200 MICROGram(s) Oral daily  metoprolol tartrate 25 milliGRAM(s) Oral two times a day  spironolactone 25 milliGRAM(s) Oral daily  ticagrelor 90 milliGRAM(s) Oral two times a day  tiotropium 18 MICROgram(s) Capsule 1 Capsule(s) Inhalation daily    MEDICATIONS  (PRN):  dextrose 40% Gel 15 Gram(s) Oral once PRN Blood Glucose LESS THAN 70 milliGRAM(s)/deciliter  glucagon  Injectable 1 milliGRAM(s) IntraMuscular once PRN Glucose LESS THAN 70 milligrams/deciliter  traMADol 50 milliGRAM(s) Oral two times a day PRN Moderate Pain (4 - 6)      Social History:  Cigarettes:                    Alchohol:                 Illicit Drug Abuse:      REVIEW OF SYSTEMS:    Constitutional: No fever, weight loss or fatigue  Eyes: No eye pain, visual disturbances, or discharge  ENMT:  No difficulty hearing, tinnitus, vertigo; No sinus or throat pain  Neck: No pain or stiffness  Respiratory: No cough, wheezing, chills or hemoptysis  Cardiovascular: No chest pain, palpitations, shortness of breath, dizziness or leg swelling  Gastrointestinal: No abdominal or epigastric pain. No nausea, vomiting or hematemesis; No diarrhea or constipation. No melena or hematochezia.  Genitourinary: No dysuria, frequency, hematuria or incontinence  Rectal: No pain, hemorrhoids or incontinence  Neurological: No headaches, memory loss, loss of strength, numbness or tremors  Skin: No itching, burning, rashes or lesions   Lymph Nodes: No enlarged glands  Endocrine: No heat or cold intolerance; No hair loss  Musculoskeletal: No joint pain or swelling; No muscle, back or extremity pain  Psychiatric: No depression, anxiety, mood swings or difficulty sleeping  Heme/Lymph: No easy bruising or bleeding gums  Allergy and Immunologic: No hives or eczema    ICU Vital Signs Last 24 Hrs  T(C): 36.4 (08 Dec 2019 10:04), Max: 36.8 (07 Dec 2019 19:14)  T(F): 97.6 (08 Dec 2019 10:04), Max: 98.3 (07 Dec 2019 19:14)  HR: 47 (08 Dec 2019 10:04) (47 - 60)  BP: 118/70 (08 Dec 2019 10:04) (113/61 - 134/77)  BP(mean): --  ABP: --  ABP(mean): --  RR: 16 (08 Dec 2019 10:04) (16 - 17)  SpO2: 93% (08 Dec 2019 09:21) (93% - 98%)  I&O's Summary    Drug Dosing Weight  JOEY CHRISTOPHER      PHYSICAL EXAM:  General: Appears well developed, well nourished alert and cooperative.  HEENT: Head; normocephalic, atraumatic.  Eyes: Pupils reactive, cornea wnl.  Neck: Supple, no nodes adenopathy, no NVD or carotid bruit or thyromegaly.  CARDIOVASCULAR: Normal S1 and S2, No murmur, rub, gallop or lift.   LUNGS: No rales, rhonchi or wheeze. Normal breath sounds bilaterally.  ABDOMEN: Soft, nontender without mass or organomegaly. bowel sounds normoactive.  EXTREMITIES: No clubbing, cyanosis or edema. Distal pulses wnl.   SKIN: warm and dry with normal turgor.  NEURO: Alert/oriented x 3/normal motor exam. No pathologic reflexes.    PSYCH: normal affect.                              11.4   8.32  )-----------( 261      ( 08 Dec 2019 06:18 )             37.0       RADIOLOGY & ADDITIONAL STUDIES:    INTERPRETATION OF TELEMETRY (personally reviewed):    ECG: < from: 12 Lead ECG (12.06.19 @ 08:21) >  Ventricular Rate 83 BPM    Atrial Rate 83 BPM    P-R Interval 190 ms    QRS Duration 106 ms    Q-T Interval 404 ms    QTC Calculation(Bezet) 474 ms    P Axis 85 degrees    R Axis -16 degrees    T Axis 87 degrees    Diagnosis Line Normal sinus rhythm  Possible Inferior infarct , age undetermined  Abnormal ECG    Confirmed by Gm Russell ([id number]) on 12/6/2019 3:38:34 PM    < end of copied text >      ECHO: < from: TTE Echo Complete w/Doppler (02.12.19 @ 09:05) >  PHYSICIAN INTERPRETATION:  Left Ventricle: The left ventricular internal cavity size is normal.  Left ventricular ejection fraction, by visual estimation, is 25 to 30%.       LV Wall Scoring:  The basal and mid anterolateral wall, mid anteroseptal segment, basal  inferoseptal segment, basal inferolateral segment, apical lateral   segment, and  basal inferior segment are akinetic.    Right Ventricle: The right ventricular size is normal.  Pericardium: A small pericardial effusion is present. The pericardial   effusion is anterior to the right ventricle.  Mitral Valve: Moderate to severe mitral valve regurgitation is seen.  Tricuspid Valve: Adequate TR velocity was not obtained to accurately   assess RVSP.  Venous: The inferior vena cava was dilated, with respiratory size   variation less than 50%.       Summary:   1. Left ventricular ejection fraction, by visual estimation, is 25 to   30%.   2. Technically difficult study.   3. Multiple left ventricular regional wall motion abnormalities exist.   See wall motion findings.   4. Small pericardial effusion.    [id number] Antony Hoyos MD, Electronically signed on 2/12/2019 at 3:39:35 PM       < end of copied text >      STRESS TEST:    CARDIAC CATHETERIZATION:< from: Cardiac Cath Lab - Adult (02.08.19 @ 18:08) >    < from: Cardiac Cath Lab - Adult (02.08.19 @ 18:08) >  INTERVENTIONAL IMPRESSIONS: Acute occlusion of proximal LCX and  of RCA  with left to right collaterals. The LCX was treated with  thrombectomy/PTCA/STENT (OSMAR-Resolute) with good result.  INTERVENTIONAL RECOMMENDATIONS: ASA and Brilinta  Prepared and signed by  Hipolito Avila MD    < end of copied text >            ACTIVE PROBLEMS:  HEALTH ISSUES - PROBLEM Dx:  Type 2 diabetes mellitus with other specified complication, without long-term current use of insulin: Type 2 diabetes mellitus with other specified complication, without long-term current use of insulin  Hypothyroidism, unspecified type: Hypothyroidism, unspecified type  Coronary artery disease involving native coronary artery of native heart without angina pectoris: Coronary artery disease involving native coronary artery of native heart without angina pectoris  History of lung cancer: History of lung cancer  Chronic obstructive pulmonary disease, unspecified COPD type: Chronic obstructive pulmonary disease, unspecified COPD type  Acute on chronic systolic congestive heart failure: Acute on chronic systolic congestive heart failure          Assessment and Plan:      Telemetry monitoring  IV diuresis improving  2DEchocardiogram MR LVEF eval  Hold entresto for now, will restart prior to DC  Discussed with  at bedside in detail    ESEQUIEL HOYOS MD, [facility], [facility], [facility]

## 2019-12-08 NOTE — PROGRESS NOTE ADULT - SUBJECTIVE AND OBJECTIVE BOX
INTERVAL HPI/OVERNIGHT EVENTS:    CC:    No overnight events, denies complaints. No shortness of breath    Vital Signs Last 24 Hrs  T(C): 36.4 (08 Dec 2019 10:04), Max: 36.8 (07 Dec 2019 19:14)  T(F): 97.6 (08 Dec 2019 10:04), Max: 98.3 (07 Dec 2019 19:14)  HR: 47 (08 Dec 2019 10:04) (47 - 60)  BP: 118/70 (08 Dec 2019 10:04) (113/61 - 134/77)  BP(mean): --  RR: 16 (08 Dec 2019 10:04) (16 - 17)  SpO2: 93% (08 Dec 2019 09:21) (93% - 98%)    PHYSICAL EXAM:    GENERAL: NAD, well-groomed, well-developed  HEAD:  Atraumatic, Normocephalic  EYES: EOMI, PERRLA, conjunctiva and sclera clear  ENMT: Moist mucous membranes  NECK: Supple, No JVD  NERVOUS SYSTEM:  Alert & Oriented X3, Motor Strength 5/5 B/L upper and lower extremities; DTRs 2+ intact and symmetric  CHEST/LUNG: Clear to auscultation bilaterally; No rales, rhonchi, wheezing, or rubs  HEART: Regular rate and rhythm; No murmurs, rubs, or gallops  ABDOMEN: Soft, Nontender, Nondistended; Bowel sounds present  EXTREMITIES:  2+ Peripheral Pulses, No clubbing, cyanosis, or edema    MEDICATIONS  (STANDING):  aspirin  chewable 81 milliGRAM(s) Oral daily  atorvastatin 40 milliGRAM(s) Oral at bedtime  azithromycin   Tablet 250 milliGRAM(s) Oral daily  cefTRIAXone   IVPB 1000 milliGRAM(s) IV Intermittent every 24 hours  dextrose 5%. 1000 milliLiter(s) (50 mL/Hr) IV Continuous <Continuous>  dextrose 50% Injectable 12.5 Gram(s) IV Push once  dextrose 50% Injectable 25 Gram(s) IV Push once  dextrose 50% Injectable 25 Gram(s) IV Push once  enoxaparin Injectable 40 milliGRAM(s) SubCutaneous daily  furosemide   Injectable 40 milliGRAM(s) IV Push daily  insulin lispro (HumaLOG) corrective regimen sliding scale   SubCutaneous three times a day before meals  levothyroxine 200 MICROGram(s) Oral daily  metoprolol tartrate 25 milliGRAM(s) Oral two times a day  spironolactone 25 milliGRAM(s) Oral daily  ticagrelor 90 milliGRAM(s) Oral two times a day  tiotropium 18 MICROgram(s) Capsule 1 Capsule(s) Inhalation daily    MEDICATIONS  (PRN):  albuterol/ipratropium for Nebulization. 3 milliLiter(s) Nebulizer every 6 hours PRN Shortness of Breath  dextrose 40% Gel 15 Gram(s) Oral once PRN Blood Glucose LESS THAN 70 milliGRAM(s)/deciliter  glucagon  Injectable 1 milliGRAM(s) IntraMuscular once PRN Glucose LESS THAN 70 milligrams/deciliter  traMADol 50 milliGRAM(s) Oral two times a day PRN Moderate Pain (4 - 6)      Allergies    No Known Allergies    Intolerances          LABS:                          11.4   8.32  )-----------( 261      ( 08 Dec 2019 06:18 )             37.0     12-08    144  |  103  |  32.0<H>  ----------------------------<  86  4.0   |  25.0  |  1.39<H>    Ca    9.6      08 Dec 2019 06:18  Mg     2.2     12-08    TPro  7.6  /  Alb  4.2  /  TBili  1.2  /  DBili  x   /  AST  14  /  ALT  13  /  AlkPhos  78  12-07          RADIOLOGY & ADDITIONAL TESTS: INTERVAL HPI/OVERNIGHT EVENTS:    CC: systolic CHF exacerbation, bronchitis, mitral regurgitation, hypothyroid    No overnight events, denies complaints. No shortness of breath    Vital Signs Last 24 Hrs  T(C): 36.4 (08 Dec 2019 10:04), Max: 36.8 (07 Dec 2019 19:14)  T(F): 97.6 (08 Dec 2019 10:04), Max: 98.3 (07 Dec 2019 19:14)  HR: 47 (08 Dec 2019 10:04) (47 - 60)  BP: 118/70 (08 Dec 2019 10:04) (113/61 - 134/77)  BP(mean): --  RR: 16 (08 Dec 2019 10:04) (16 - 17)  SpO2: 93% (08 Dec 2019 09:21) (93% - 98%)    PHYSICAL EXAM:    GENERAL: Not in distress, alert  CHEST/LUNG: b/l air entry  HEART: Regular  ABDOMEN: Soft, BS+  EXTREMITIES:  no edema, tenderness    MEDICATIONS  (STANDING):  aspirin  chewable 81 milliGRAM(s) Oral daily  atorvastatin 40 milliGRAM(s) Oral at bedtime  azithromycin   Tablet 250 milliGRAM(s) Oral daily  cefTRIAXone   IVPB 1000 milliGRAM(s) IV Intermittent every 24 hours  dextrose 5%. 1000 milliLiter(s) (50 mL/Hr) IV Continuous <Continuous>  dextrose 50% Injectable 12.5 Gram(s) IV Push once  dextrose 50% Injectable 25 Gram(s) IV Push once  dextrose 50% Injectable 25 Gram(s) IV Push once  enoxaparin Injectable 40 milliGRAM(s) SubCutaneous daily  furosemide   Injectable 40 milliGRAM(s) IV Push daily  insulin lispro (HumaLOG) corrective regimen sliding scale   SubCutaneous three times a day before meals  levothyroxine 200 MICROGram(s) Oral daily  metoprolol tartrate 25 milliGRAM(s) Oral two times a day  spironolactone 25 milliGRAM(s) Oral daily  ticagrelor 90 milliGRAM(s) Oral two times a day  tiotropium 18 MICROgram(s) Capsule 1 Capsule(s) Inhalation daily    MEDICATIONS  (PRN):  albuterol/ipratropium for Nebulization. 3 milliLiter(s) Nebulizer every 6 hours PRN Shortness of Breath  dextrose 40% Gel 15 Gram(s) Oral once PRN Blood Glucose LESS THAN 70 milliGRAM(s)/deciliter  glucagon  Injectable 1 milliGRAM(s) IntraMuscular once PRN Glucose LESS THAN 70 milligrams/deciliter  traMADol 50 milliGRAM(s) Oral two times a day PRN Moderate Pain (4 - 6)      Allergies    No Known Allergies    Intolerances          LABS:                          11.4   8.32  )-----------( 261      ( 08 Dec 2019 06:18 )             37.0     12-08    144  |  103  |  32.0<H>  ----------------------------<  86  4.0   |  25.0  |  1.39<H>    Ca    9.6      08 Dec 2019 06:18  Mg     2.2     12-08    TPro  7.6  /  Alb  4.2  /  TBili  1.2  /  DBili  x   /  AST  14  /  ALT  13  /  AlkPhos  78  12-07          RADIOLOGY & ADDITIONAL TESTS:

## 2019-12-09 LAB
ANION GAP SERPL CALC-SCNC: 14 MMOL/L — SIGNIFICANT CHANGE UP (ref 5–17)
BUN SERPL-MCNC: 42 MG/DL — HIGH (ref 8–20)
CALCIUM SERPL-MCNC: 9.9 MG/DL — SIGNIFICANT CHANGE UP (ref 8.6–10.2)
CHLORIDE SERPL-SCNC: 103 MMOL/L — SIGNIFICANT CHANGE UP (ref 98–107)
CO2 SERPL-SCNC: 25 MMOL/L — SIGNIFICANT CHANGE UP (ref 22–29)
CREAT SERPL-MCNC: 1.38 MG/DL — HIGH (ref 0.5–1.3)
GLUCOSE BLDC GLUCOMTR-MCNC: 64 MG/DL — LOW (ref 70–99)
GLUCOSE BLDC GLUCOMTR-MCNC: 72 MG/DL — SIGNIFICANT CHANGE UP (ref 70–99)
GLUCOSE BLDC GLUCOMTR-MCNC: 89 MG/DL — SIGNIFICANT CHANGE UP (ref 70–99)
GLUCOSE SERPL-MCNC: 82 MG/DL — SIGNIFICANT CHANGE UP (ref 70–115)
HCT VFR BLD CALC: 37.7 % — SIGNIFICANT CHANGE UP (ref 34.5–45)
HGB BLD-MCNC: 11.5 G/DL — SIGNIFICANT CHANGE UP (ref 11.5–15.5)
MCHC RBC-ENTMCNC: 26.9 PG — LOW (ref 27–34)
MCHC RBC-ENTMCNC: 30.5 GM/DL — LOW (ref 32–36)
MCV RBC AUTO: 88.3 FL — SIGNIFICANT CHANGE UP (ref 80–100)
PLATELET # BLD AUTO: 261 K/UL — SIGNIFICANT CHANGE UP (ref 150–400)
POTASSIUM SERPL-MCNC: 4.1 MMOL/L — SIGNIFICANT CHANGE UP (ref 3.5–5.3)
POTASSIUM SERPL-SCNC: 4.1 MMOL/L — SIGNIFICANT CHANGE UP (ref 3.5–5.3)
RBC # BLD: 4.27 M/UL — SIGNIFICANT CHANGE UP (ref 3.8–5.2)
RBC # FLD: 17.6 % — HIGH (ref 10.3–14.5)
SODIUM SERPL-SCNC: 142 MMOL/L — SIGNIFICANT CHANGE UP (ref 135–145)
WBC # BLD: 6.19 K/UL — SIGNIFICANT CHANGE UP (ref 3.8–10.5)
WBC # FLD AUTO: 6.19 K/UL — SIGNIFICANT CHANGE UP (ref 3.8–10.5)

## 2019-12-09 PROCEDURE — 99232 SBSQ HOSP IP/OBS MODERATE 35: CPT

## 2019-12-09 RX ORDER — FUROSEMIDE 40 MG
20 TABLET ORAL DAILY
Refills: 0 | Status: DISCONTINUED | OUTPATIENT
Start: 2019-12-09 | End: 2019-12-10

## 2019-12-09 RX ORDER — LIDOCAINE 4 G/100G
1 CREAM TOPICAL DAILY
Refills: 0 | Status: DISCONTINUED | OUTPATIENT
Start: 2019-12-09 | End: 2019-12-10

## 2019-12-09 RX ORDER — CARVEDILOL PHOSPHATE 80 MG/1
3.12 CAPSULE, EXTENDED RELEASE ORAL EVERY 12 HOURS
Refills: 0 | Status: DISCONTINUED | OUTPATIENT
Start: 2019-12-09 | End: 2019-12-10

## 2019-12-09 RX ORDER — SACUBITRIL AND VALSARTAN 24; 26 MG/1; MG/1
1 TABLET, FILM COATED ORAL
Refills: 0 | Status: DISCONTINUED | OUTPATIENT
Start: 2019-12-09 | End: 2019-12-10

## 2019-12-09 RX ADMIN — TRAMADOL HYDROCHLORIDE 50 MILLIGRAM(S): 50 TABLET ORAL at 21:02

## 2019-12-09 RX ADMIN — Medication 20 MILLIGRAM(S): at 11:30

## 2019-12-09 RX ADMIN — ATORVASTATIN CALCIUM 40 MILLIGRAM(S): 80 TABLET, FILM COATED ORAL at 21:02

## 2019-12-09 RX ADMIN — TICAGRELOR 90 MILLIGRAM(S): 90 TABLET ORAL at 17:00

## 2019-12-09 RX ADMIN — LIDOCAINE 1 PATCH: 4 CREAM TOPICAL at 19:58

## 2019-12-09 RX ADMIN — TIOTROPIUM BROMIDE 1 CAPSULE(S): 18 CAPSULE ORAL; RESPIRATORY (INHALATION) at 09:33

## 2019-12-09 RX ADMIN — LIDOCAINE 1 PATCH: 4 CREAM TOPICAL at 11:30

## 2019-12-09 RX ADMIN — LIDOCAINE 1 PATCH: 4 CREAM TOPICAL at 23:30

## 2019-12-09 RX ADMIN — Medication 200 MICROGRAM(S): at 05:29

## 2019-12-09 RX ADMIN — SACUBITRIL AND VALSARTAN 1 TABLET(S): 24; 26 TABLET, FILM COATED ORAL at 17:00

## 2019-12-09 RX ADMIN — SPIRONOLACTONE 25 MILLIGRAM(S): 25 TABLET, FILM COATED ORAL at 05:30

## 2019-12-09 RX ADMIN — ENOXAPARIN SODIUM 40 MILLIGRAM(S): 100 INJECTION SUBCUTANEOUS at 21:02

## 2019-12-09 RX ADMIN — Medication 40 MILLIGRAM(S): at 05:30

## 2019-12-09 RX ADMIN — TICAGRELOR 90 MILLIGRAM(S): 90 TABLET ORAL at 05:30

## 2019-12-09 RX ADMIN — TRAMADOL HYDROCHLORIDE 50 MILLIGRAM(S): 50 TABLET ORAL at 21:45

## 2019-12-09 RX ADMIN — CARVEDILOL PHOSPHATE 3.12 MILLIGRAM(S): 80 CAPSULE, EXTENDED RELEASE ORAL at 17:00

## 2019-12-09 RX ADMIN — Medication 81 MILLIGRAM(S): at 11:30

## 2019-12-09 NOTE — PROGRESS NOTE ADULT - ASSESSMENT
74 yr old female with COPD, lung ca s/p chemotherapy and immunotherapy, CAD, CHF, hypothyroid admitted with complaints of shortness of breath for few days, required transient BiPAP in ED. Noted to have pleural effusions on imaging, with elevated BNP. She was started on IV Lasix, cardiology was consulted, ECHO was ordered. Empiric antibiotics given for bronchitis. She was noted to have mild elevation in creatinine. Lasix dose was adjusted. She was noted to have mitral regurgitation and decreased ejection fraction on ECHO. CHU was offered to patient but she preferred to follow up as outpatient. Her Lasix dose was lowered given elevated creatinine and Entresto was reintroduced at a lower dose.

## 2019-12-09 NOTE — PROGRESS NOTE ADULT - SUBJECTIVE AND OBJECTIVE BOX
INTERVAL HPI/OVERNIGHT EVENTS:    CC: mitral regurgitation, systolic CHF exacerbation, bronchitis, mitral regurgitation, hypothyroid    No overnight events, denies complaints.     Vital Signs Last 24 Hrs  T(C): 36.7 (09 Dec 2019 11:33), Max: 36.8 (08 Dec 2019 21:38)  T(F): 98.1 (09 Dec 2019 11:33), Max: 98.3 (08 Dec 2019 21:38)  HR: 49 (09 Dec 2019 11:33) (46 - 64)  BP: 106/60 (09 Dec 2019 11:33) (106/60 - 137/73)  BP(mean): --  RR: 16 (09 Dec 2019 11:33) (16 - 18)  SpO2: 95% (09 Dec 2019 11:33) (90% - 97%)    PHYSICAL EXAM:    GENERAL: not in distress, alert  CHEST/LUNG: b/l air entry, clear  HEART: Regular  ABDOMEN: Soft, BS+  EXTREMITIES: no edema, tenderness.     MEDICATIONS  (STANDING):  aspirin  chewable 81 milliGRAM(s) Oral daily  atorvastatin 40 milliGRAM(s) Oral at bedtime  carvedilol 3.125 milliGRAM(s) Oral every 12 hours  dextrose 5%. 1000 milliLiter(s) (50 mL/Hr) IV Continuous <Continuous>  dextrose 50% Injectable 12.5 Gram(s) IV Push once  dextrose 50% Injectable 25 Gram(s) IV Push once  dextrose 50% Injectable 25 Gram(s) IV Push once  enoxaparin Injectable 40 milliGRAM(s) SubCutaneous daily  furosemide    Tablet 20 milliGRAM(s) Oral daily  insulin lispro (HumaLOG) corrective regimen sliding scale   SubCutaneous three times a day before meals  levothyroxine 200 MICROGram(s) Oral daily  lidocaine   Patch 1 Patch Transdermal daily  sacubitril 49 mG/valsartan 51 mG 1 Tablet(s) Oral two times a day  spironolactone 25 milliGRAM(s) Oral daily  ticagrelor 90 milliGRAM(s) Oral two times a day  tiotropium 18 MICROgram(s) Capsule 1 Capsule(s) Inhalation daily    MEDICATIONS  (PRN):  albuterol/ipratropium for Nebulization. 3 milliLiter(s) Nebulizer every 6 hours PRN Shortness of Breath  dextrose 40% Gel 15 Gram(s) Oral once PRN Blood Glucose LESS THAN 70 milliGRAM(s)/deciliter  glucagon  Injectable 1 milliGRAM(s) IntraMuscular once PRN Glucose LESS THAN 70 milligrams/deciliter  traMADol 50 milliGRAM(s) Oral two times a day PRN Moderate Pain (4 - 6)      Allergies    No Known Allergies    Intolerances          LABS:                          11.5   6.19  )-----------( 261      ( 09 Dec 2019 06:13 )             37.7     12-09    142  |  103  |  42.0<H>  ----------------------------<  82  4.1   |  25.0  |  1.38<H>    Ca    9.9      09 Dec 2019 06:13  Mg     2.2     12-08            RADIOLOGY & ADDITIONAL TESTS:

## 2019-12-09 NOTE — PROGRESS NOTE ADULT - SUBJECTIVE AND OBJECTIVE BOX
[place] [facility] - [facility], [facility]                                   [address] [place] [address]                                                      PHONE: [phone]                                                         FAX: [phone]  [email or web address]  ---------------------------------------------------------------------------------------------------------------------------------    Overnight events/patient complaints: less dyspneic      No Known Allergies    MEDICATIONS  (STANDING):  aspirin  chewable 81 milliGRAM(s) Oral daily  atorvastatin 40 milliGRAM(s) Oral at bedtime  dextrose 5%. 1000 milliLiter(s) (50 mL/Hr) IV Continuous <Continuous>  dextrose 50% Injectable 12.5 Gram(s) IV Push once  dextrose 50% Injectable 25 Gram(s) IV Push once  dextrose 50% Injectable 25 Gram(s) IV Push once  enoxaparin Injectable 40 milliGRAM(s) SubCutaneous daily  furosemide   Injectable 40 milliGRAM(s) IV Push daily  insulin lispro (HumaLOG) corrective regimen sliding scale   SubCutaneous three times a day before meals  levothyroxine 200 MICROGram(s) Oral daily  metoprolol tartrate 25 milliGRAM(s) Oral two times a day  spironolactone 25 milliGRAM(s) Oral daily  ticagrelor 90 milliGRAM(s) Oral two times a day  tiotropium 18 MICROgram(s) Capsule 1 Capsule(s) Inhalation daily    MEDICATIONS  (PRN):  albuterol/ipratropium for Nebulization. 3 milliLiter(s) Nebulizer every 6 hours PRN Shortness of Breath  dextrose 40% Gel 15 Gram(s) Oral once PRN Blood Glucose LESS THAN 70 milliGRAM(s)/deciliter  glucagon  Injectable 1 milliGRAM(s) IntraMuscular once PRN Glucose LESS THAN 70 milligrams/deciliter  traMADol 50 milliGRAM(s) Oral two times a day PRN Moderate Pain (4 - 6)      Vital Signs Last 24 Hrs  T(C): 36.6 (09 Dec 2019 05:11), Max: 36.8 (08 Dec 2019 21:38)  T(F): 97.8 (09 Dec 2019 05:11), Max: 98.3 (08 Dec 2019 21:38)  HR: 46 (09 Dec 2019 05:11) (46 - 64)  BP: 116/73 (09 Dec 2019 05:11) (116/73 - 137/73)  BP(mean): --  RR: 18 (09 Dec 2019 05:11) (16 - 18)  SpO2: 97% (09 Dec 2019 05:11) (90% - 97%)  Daily     Daily   ICU Vital Signs Last 24 Hrs  JOEY CHRISTOPHER  I&O's Detail    08 Dec 2019 07:01  -  09 Dec 2019 07:00  --------------------------------------------------------  IN:    Oral Fluid: 480 mL  Total IN: 480 mL    OUT:    Voided: 200 mL  Total OUT: 200 mL    Total NET: 280 mL        I&O's Summary    08 Dec 2019 07:01  -  09 Dec 2019 07:00  --------------------------------------------------------  IN: 480 mL / OUT: 200 mL / NET: 280 mL      Drug Dosing Weight  JOEY CHRISTOPHER      PHYSICAL EXAM:  General: Appears well developed, well nourished alert and cooperative.  HEENT: Head; normocephalic, atraumatic.  Eyes: Pupils reactive, cornea wnl.  Neck: Supple, no nodes adenopathy, no NVD or carotid bruit or thyromegaly.  CARDIOVASCULAR: Normal S1 and S2, 3/6 MR murmur   LUNGS: No rales, rhonchi or wheeze. Normal breath sounds bilaterally.  ABDOMEN: Soft, nontender without mass or organomegaly. bowel sounds normoactive.  EXTREMITIES: No clubbing, cyanosis or edema. Distal pulses wnl.   SKIN: warm and dry with normal turgor.  NEURO: Alert/oriented x 3/normal motor exam. No pathologic reflexes.    PSYCH: normal affect.        LABS:                        11.5   6.19  )-----------( 261      ( 09 Dec 2019 06:13 )             37.7     12-09    142  |  103  |  42.0<H>  ----------------------------<  82  4.1   |  25.0  |  1.38<H>    Ca    9.9      09 Dec 2019 06:13  Mg     2.2     12-08      JOEY CHRISTOPHER            RADIOLOGY & ADDITIONAL STUDIES:    INTERPRETATION OF TELEMETRY (personally reviewed):    ECG:< from: 12 Lead ECG (12.06.19 @ 08:21) >    Diagnosis Line Normal sinus rhythm  Possible Inferior infarct , age undetermined  Abnormal ECG    Confirmed by Gm Russell ([id number]) on 12/6/2019 3:38:34 PM    < end of copied text >      ECHO:< from: TTE Echo Complete w/Doppler (12.08.19 @ 08:44) >  Summary:   1. Severely decreased segmental left ventricular systolic function. Left   ventricular ejection fraction, by visual estimation, is 30 to 35%.   Lateral and inferior segments hypokinetic.   2. Low normal RV systolic function.   3. Severely enlarged left atrium.   4. Mildly increased left ventricular internal cavity size.   5. There is mild concentric left ventricular hypertrophy.   6. Moderate to severe mitral valve regurgitation, ischemic mitral   regurgitation.   7. Mild tricuspid regurgitation.   8. Mildly dilated pulmonary artery.   9. Trivial pericardial effusion.    Lorena Aleman DO Electronically signed on 12/8/2019 at 1:58:55 PM         *** Final ***              < end of copied text >

## 2019-12-09 NOTE — PROGRESS NOTE ADULT - PROBLEM SELECTOR PLAN 1
Likely secondary to mitral regurgitation, CHU was offered but patient preferred to do it as outpatient. Lower Lasix and add Entresto today, monitor BMP.

## 2019-12-09 NOTE — PROGRESS NOTE ADULT - ASSESSMENT
Assessment  Acute on chronic systolic mumur  Ischemic CM with decline in EF 30-35%  ? sig MR  Met lung Ca s/p chemo ? remission      Rec  Resume low dose entresto  decrease lasix  switch lopressor to coreg  discussed CHU to assess MR - pt declinig this admssion  ? dc in am and will FU in office CF clinic titrating up entresto Assessment  Acute on chronic systolic mumur  Ischemic CM with decline in EF 30-35%  ? sig MR  Met lung Ca s/p chemo ? remission  s/p IWMI Cx PCI OSMAR 2/2019      Rec  Resume low dose entresto  decrease lasix  switch lopressor to coreg  discussed CHU to assess MR - pt declinig this admssion  ? dc in am and will FU in office CF clinic titrating up entresto

## 2019-12-10 ENCOUNTER — TRANSCRIPTION ENCOUNTER (OUTPATIENT)
Age: 74
End: 2019-12-10

## 2019-12-10 VITALS
OXYGEN SATURATION: 98 % | RESPIRATION RATE: 19 BRPM | TEMPERATURE: 97 F | HEART RATE: 54 BPM | DIASTOLIC BLOOD PRESSURE: 68 MMHG | SYSTOLIC BLOOD PRESSURE: 113 MMHG

## 2019-12-10 LAB
ANION GAP SERPL CALC-SCNC: 13 MMOL/L — SIGNIFICANT CHANGE UP (ref 5–17)
BUN SERPL-MCNC: 32 MG/DL — HIGH (ref 8–20)
CALCIUM SERPL-MCNC: 10.2 MG/DL — SIGNIFICANT CHANGE UP (ref 8.6–10.2)
CHLORIDE SERPL-SCNC: 102 MMOL/L — SIGNIFICANT CHANGE UP (ref 98–107)
CO2 SERPL-SCNC: 23 MMOL/L — SIGNIFICANT CHANGE UP (ref 22–29)
CREAT SERPL-MCNC: 1.04 MG/DL — SIGNIFICANT CHANGE UP (ref 0.5–1.3)
GLUCOSE BLDC GLUCOMTR-MCNC: 74 MG/DL — SIGNIFICANT CHANGE UP (ref 70–99)
GLUCOSE SERPL-MCNC: 87 MG/DL — SIGNIFICANT CHANGE UP (ref 70–115)
NT-PROBNP SERPL-SCNC: 1169 PG/ML — HIGH (ref 0–300)
POTASSIUM SERPL-MCNC: 4.8 MMOL/L — SIGNIFICANT CHANGE UP (ref 3.5–5.3)
POTASSIUM SERPL-SCNC: 4.8 MMOL/L — SIGNIFICANT CHANGE UP (ref 3.5–5.3)
SODIUM SERPL-SCNC: 138 MMOL/L — SIGNIFICANT CHANGE UP (ref 135–145)

## 2019-12-10 PROCEDURE — 96374 THER/PROPH/DIAG INJ IV PUSH: CPT

## 2019-12-10 PROCEDURE — 85027 COMPLETE CBC AUTOMATED: CPT

## 2019-12-10 PROCEDURE — 93306 TTE W/DOPPLER COMPLETE: CPT

## 2019-12-10 PROCEDURE — 83036 HEMOGLOBIN GLYCOSYLATED A1C: CPT

## 2019-12-10 PROCEDURE — 36415 COLL VENOUS BLD VENIPUNCTURE: CPT

## 2019-12-10 PROCEDURE — 80053 COMPREHEN METABOLIC PANEL: CPT

## 2019-12-10 PROCEDURE — 82962 GLUCOSE BLOOD TEST: CPT

## 2019-12-10 PROCEDURE — 85730 THROMBOPLASTIN TIME PARTIAL: CPT

## 2019-12-10 PROCEDURE — 99239 HOSP IP/OBS DSCHRG MGMT >30: CPT

## 2019-12-10 PROCEDURE — 96375 TX/PRO/DX INJ NEW DRUG ADDON: CPT

## 2019-12-10 PROCEDURE — 83735 ASSAY OF MAGNESIUM: CPT

## 2019-12-10 PROCEDURE — 85610 PROTHROMBIN TIME: CPT

## 2019-12-10 PROCEDURE — 94640 AIRWAY INHALATION TREATMENT: CPT

## 2019-12-10 PROCEDURE — 99291 CRITICAL CARE FIRST HOUR: CPT | Mod: 25

## 2019-12-10 PROCEDURE — 84484 ASSAY OF TROPONIN QUANT: CPT

## 2019-12-10 PROCEDURE — 83880 ASSAY OF NATRIURETIC PEPTIDE: CPT

## 2019-12-10 PROCEDURE — 93005 ELECTROCARDIOGRAM TRACING: CPT

## 2019-12-10 PROCEDURE — 71250 CT THORAX DX C-: CPT

## 2019-12-10 PROCEDURE — 80048 BASIC METABOLIC PNL TOTAL CA: CPT

## 2019-12-10 PROCEDURE — 86803 HEPATITIS C AB TEST: CPT

## 2019-12-10 PROCEDURE — 71045 X-RAY EXAM CHEST 1 VIEW: CPT

## 2019-12-10 RX ORDER — CARVEDILOL PHOSPHATE 80 MG/1
1 CAPSULE, EXTENDED RELEASE ORAL
Qty: 60 | Refills: 0
Start: 2019-12-10 | End: 2020-01-08

## 2019-12-10 RX ORDER — FUROSEMIDE 40 MG
1 TABLET ORAL
Qty: 30 | Refills: 0 | DISCHARGE
Start: 2019-12-10 | End: 2020-01-08

## 2019-12-10 RX ORDER — FUROSEMIDE 40 MG
1 TABLET ORAL
Qty: 30 | Refills: 0
Start: 2019-12-10 | End: 2020-01-08

## 2019-12-10 RX ORDER — SACUBITRIL AND VALSARTAN 24; 26 MG/1; MG/1
1 TABLET, FILM COATED ORAL
Qty: 0 | Refills: 0 | DISCHARGE

## 2019-12-10 RX ORDER — SACUBITRIL AND VALSARTAN 24; 26 MG/1; MG/1
1 TABLET, FILM COATED ORAL
Qty: 60 | Refills: 0
Start: 2019-12-10 | End: 2020-01-08

## 2019-12-10 RX ADMIN — TICAGRELOR 90 MILLIGRAM(S): 90 TABLET ORAL at 05:42

## 2019-12-10 RX ADMIN — Medication 81 MILLIGRAM(S): at 09:35

## 2019-12-10 RX ADMIN — TIOTROPIUM BROMIDE 1 CAPSULE(S): 18 CAPSULE ORAL; RESPIRATORY (INHALATION) at 08:41

## 2019-12-10 RX ADMIN — SPIRONOLACTONE 25 MILLIGRAM(S): 25 TABLET, FILM COATED ORAL at 05:42

## 2019-12-10 RX ADMIN — Medication 20 MILLIGRAM(S): at 05:42

## 2019-12-10 RX ADMIN — SACUBITRIL AND VALSARTAN 1 TABLET(S): 24; 26 TABLET, FILM COATED ORAL at 05:42

## 2019-12-10 RX ADMIN — Medication 200 MICROGRAM(S): at 05:42

## 2019-12-10 RX ADMIN — LIDOCAINE 1 PATCH: 4 CREAM TOPICAL at 09:34

## 2019-12-10 RX ADMIN — CARVEDILOL PHOSPHATE 3.12 MILLIGRAM(S): 80 CAPSULE, EXTENDED RELEASE ORAL at 05:42

## 2019-12-10 NOTE — PROGRESS NOTE ADULT - SUBJECTIVE AND OBJECTIVE BOX
[place] [facility] - [facility], [facility]                                   [address] [place] [address]                                                      PHONE: [phone]                                                         FAX: [phone]  [email or web address]  ---------------------------------------------------------------------------------------------------------------------------------    Overnight events/patient complaints: less dyspneic, julian 50s asymptomatic no CP or SOB      No Known Allergies    MEDICATIONS  (STANDING):  aspirin  chewable 81 milliGRAM(s) Oral daily  atorvastatin 40 milliGRAM(s) Oral at bedtime  dextrose 5%. 1000 milliLiter(s) (50 mL/Hr) IV Continuous <Continuous>  dextrose 50% Injectable 12.5 Gram(s) IV Push once  dextrose 50% Injectable 25 Gram(s) IV Push once  dextrose 50% Injectable 25 Gram(s) IV Push once  enoxaparin Injectable 40 milliGRAM(s) SubCutaneous daily  furosemide   Injectable 40 milliGRAM(s) IV Push daily  insulin lispro (HumaLOG) corrective regimen sliding scale   SubCutaneous three times a day before meals  levothyroxine 200 MICROGram(s) Oral daily  metoprolol tartrate 25 milliGRAM(s) Oral two times a day  spironolactone 25 milliGRAM(s) Oral daily  ticagrelor 90 milliGRAM(s) Oral two times a day  tiotropium 18 MICROgram(s) Capsule 1 Capsule(s) Inhalation daily    MEDICATIONS  (PRN):  albuterol/ipratropium for Nebulization. 3 milliLiter(s) Nebulizer every 6 hours PRN Shortness of Breath  dextrose 40% Gel 15 Gram(s) Oral once PRN Blood Glucose LESS THAN 70 milliGRAM(s)/deciliter  glucagon  Injectable 1 milliGRAM(s) IntraMuscular once PRN Glucose LESS THAN 70 milligrams/deciliter  traMADol 50 milliGRAM(s) Oral two times a day PRN Moderate Pain (4 - 6)      Vital Signs Last 24 Hrs  T(C): 36.6 (09 Dec 2019 05:11), Max: 36.8 (08 Dec 2019 21:38)  T(F): 97.8 (09 Dec 2019 05:11), Max: 98.3 (08 Dec 2019 21:38)  HR: 46 (09 Dec 2019 05:11) (46 - 64)  BP: 116/73 (09 Dec 2019 05:11) (116/73 - 137/73)  BP(mean): --  RR: 18 (09 Dec 2019 05:11) (16 - 18)  SpO2: 97% (09 Dec 2019 05:11) (90% - 97%)  Daily     Daily   ICU Vital Signs Last 24 Hrs  JOEY CHRISTOPHER  I&O's Detail    08 Dec 2019 07:01  -  09 Dec 2019 07:00  --------------------------------------------------------  IN:    Oral Fluid: 480 mL  Total IN: 480 mL    OUT:    Voided: 200 mL  Total OUT: 200 mL    Total NET: 280 mL        I&O's Summary    08 Dec 2019 07:01  -  09 Dec 2019 07:00  --------------------------------------------------------  IN: 480 mL / OUT: 200 mL / NET: 280 mL      Drug Dosing Weight  JOEY CHRISTOPHER      PHYSICAL EXAM:  General: Appears well developed, well nourished alert and cooperative.  HEENT: Head; normocephalic, atraumatic.  Eyes: Pupils reactive, cornea wnl.  Neck: Supple, no nodes adenopathy, no NVD or carotid bruit or thyromegaly.  CARDIOVASCULAR: Normal S1 and S2, 3/6 MR murmur   LUNGS: No rales, rhonchi or wheeze. Normal breath sounds bilaterally.  ABDOMEN: Soft, nontender without mass or organomegaly. bowel sounds normoactive.  EXTREMITIES: No clubbing, cyanosis or edema. Distal pulses wnl.   SKIN: warm and dry with normal turgor.  NEURO: Alert/oriented x 3/normal motor exam. No pathologic reflexes.    PSYCH: normal affect.        LABS:                        11.5   6.19  )-----------( 261      ( 09 Dec 2019 06:13 )             37.7     12-09    142  |  103  |  42.0<H>  ----------------------------<  82  4.1   |  25.0  |  1.38<H>    Ca    9.9      09 Dec 2019 06:13  Mg     2.2     12-08      JOEY CHRISTOPHER            RADIOLOGY & ADDITIONAL STUDIES:    INTERPRETATION OF TELEMETRY (personally reviewed):    ECG:< from: 12 Lead ECG (12.06.19 @ 08:21) >    Diagnosis Line Normal sinus rhythm  Possible Inferior infarct , age undetermined  Abnormal ECG    Confirmed by Gm Russell ([id number]) on 12/6/2019 3:38:34 PM    < end of copied text >      ECHO:< from: TTE Echo Complete w/Doppler (12.08.19 @ 08:44) >  Summary:   1. Severely decreased segmental left ventricular systolic function. Left   ventricular ejection fraction, by visual estimation, is 30 to 35%.   Lateral and inferior segments hypokinetic.   2. Low normal RV systolic function.   3. Severely enlarged left atrium.   4. Mildly increased left ventricular internal cavity size.   5. There is mild concentric left ventricular hypertrophy.   6. Moderate to severe mitral valve regurgitation, ischemic mitral   regurgitation.   7. Mild tricuspid regurgitation.   8. Mildly dilated pulmonary artery.   9. Trivial pericardial effusion.    Lorena Aleman DO Electronically signed on 12/8/2019 at 1:58:55 PM         *** Final ***              < end of copied text >

## 2019-12-10 NOTE — DISCHARGE NOTE PROVIDER - PROVIDER TOKENS
PROVIDER:[TOKEN:[[id number]]],PROVIDER:[TOKEN:[[id number]]] PROVIDER:[TOKEN:[[id number]]],PROVIDER:[TOKEN:[[id number]]],PROVIDER:[TOKEN:[[id number]]]

## 2019-12-10 NOTE — DISCHARGE NOTE NURSING/CASE MANAGEMENT/SOCIAL WORK - NSDCFUADDAPPT_GEN_ALL_CORE_FT
CARDIO -the office will call to schedule the follow up after DC – she does have a few upcoming appts. 1/8/20 at 1245 she is getting an echo and 2/3/20 at 130 follow up with the  CARDIO -the office will call to schedule the follow up after DC – she does have a few upcoming appts. 1/8/20 at 1245 she is getting an echo and 2/3/20 at 130 follow up with the dr  Pharmacy [facility] ( [facility]in [place] - pt declines having any issues w medication management

## 2019-12-10 NOTE — DISCHARGE NOTE NURSING/CASE MANAGEMENT/SOCIAL WORK - PATIENT PORTAL LINK FT
You can access the FollowMyHealth Patient Portal offered by [facility] by registering at the following website: [email or web address]. By joining [facility]’s FollowMyHealth portal, you will also be able to view your health information using other applications (apps) compatible with our system.

## 2019-12-10 NOTE — DISCHARGE NOTE PROVIDER - HOSPITAL COURSE
74 yr old female with COPD, lung ca s/p chemotherapy and immunotherapy, CAD, CHF, hypothyroid admitted with complaints of shortness of breath for few days, required transient BiPAP in ED. Noted to have pleural effusions on imaging, with elevated BNP. She was started on IV Lasix, cardiology was consulted, ECHO was ordered. Empiric antibiotics given for bronchitis. She was noted to have mild elevation in creatinine. Lasix dose was adjusted. She was noted to have mitral regurgitation and decreased ejection fraction on ECHO. CHU was offered to patient but she preferred to follow up as outpatient. Her Lasix dose was lowered given elevated creatinine and Entresto was reintroduced at a lower dose. Her renal function remained stable and she improved clinically. She is stable for discharge home.        Spent > 35 mins in discharge plan and documentation.

## 2019-12-10 NOTE — DISCHARGE NOTE PROVIDER - NSDCMRMEDTOKEN_GEN_ALL_CORE_FT
aspirin 81 mg oral tablet, chewable: 1 tab(s) orally once a day  atorvastatin 40 mg oral tablet: 1 tab(s) orally once a day (at bedtime)  Entresto 97 mg-103 mg oral tablet: 1 tab(s) orally 2 times a day  ipratropium-albuterol 0.5 mg-2.5 mg/3 mLinhalation solution: 3 milliliter(s) by nebulizer 4 times a day as needed SOB  levothyroxine 200 mcg (0.2 mg)/mL oral solution: 1 milliliter(s) orally once a day  metoprolol tartrate 25 mg oral tablet: 1 tab(s) orally 2 times a day  spironolactone 25 mg oral tablet: 1 tab(s) orally once a day (at bedtime)  ticagrelor 90 mg oral tablet: 1 tab(s) orally 2 times a day  tiotropium 18 mcg inhalation capsule: 1 cap(s) inhaled once a day  traMADol 50 mg oral tablet: 1 tab(s) orally 2 times a day aspirin 81 mg oral tablet, chewable: 1 tab(s) orally once a day  atorvastatin 40 mg oral tablet: 1 tab(s) orally once a day (at bedtime)  carvedilol 3.125 mg oral tablet: 1 tab(s) orally every 12 hours  furosemide 20 mg oral tablet: 1 tab(s) orally once a day  ipratropium-albuterol 0.5 mg-2.5 mg/3 mLinhalation solution: 3 milliliter(s) by nebulizer 4 times a day as needed SOB  levothyroxine 200 mcg (0.2 mg)/mL oral solution: 1 milliliter(s) orally once a day  sacubitril-valsartan 49 mg-51 mg oral tablet: 1 tab(s) orally 2 times a day  spironolactone 25 mg oral tablet: 1 tab(s) orally once a day (at bedtime)  ticagrelor 90 mg oral tablet: 1 tab(s) orally 2 times a day  tiotropium 18 mcg inhalation capsule: 1 cap(s) inhaled once a day  traMADol 50 mg oral tablet: 1 tab(s) orally 2 times a day

## 2019-12-10 NOTE — PROGRESS NOTE ADULT - PROBLEM SELECTOR PLAN 1
Continue Lasix, Entresto, low dose Coreg, statin.   Will follow up with cardiology in 1 week for monitoring of HR and regarding scheduling CHU to assess mitral valve.  Discussed with patient importance of fluid restriction, monitoring weight daily and low sodium diet.

## 2019-12-10 NOTE — DISCHARGE NOTE PROVIDER - NSDCCPCAREPLAN_GEN_ALL_CORE_FT
PRINCIPAL DISCHARGE DIAGNOSIS  Diagnosis: Systolic CHF, acute on chronic  Assessment and Plan of Treatment:       SECONDARY DISCHARGE DIAGNOSES  Diagnosis: Mitral regurgitation  Assessment and Plan of Treatment:

## 2019-12-10 NOTE — PROGRESS NOTE ADULT - SUBJECTIVE AND OBJECTIVE BOX
INTERVAL HPI/OVERNIGHT EVENTS:    CC: mitral regurgitation, systolic CHF exacerbation, bronchitis, mitral regurgitation, hypothyroid    No overnight events, denies complaints. Discussed with patient regarding low HR, she has been advised outpatient holter monitoring by cardiology    Vital Signs Last 24 Hrs  T(C): 36.6 (10 Dec 2019 09:52), Max: 36.9 (09 Dec 2019 21:00)  T(F): 97.8 (10 Dec 2019 09:52), Max: 98.4 (09 Dec 2019 21:00)  HR: 76 (10 Dec 2019 09:52) (49 - 76)  BP: 110/69 (10 Dec 2019 09:52) (106/60 - 136/74)  BP(mean): --  RR: 20 (10 Dec 2019 09:52) (16 - 20)  SpO2: 100% (10 Dec 2019 09:52) (95% - 100%)    PHYSICAL EXAM:    GENERAL: Not in distress, alert  CHEST/LUNG: b/l air entry, clear  HEART: Regular   ABDOMEN: Soft,BS+  EXTREMITIES:  no edema, tenderness.     MEDICATIONS  (STANDING):  aspirin  chewable 81 milliGRAM(s) Oral daily  atorvastatin 40 milliGRAM(s) Oral at bedtime  carvedilol 3.125 milliGRAM(s) Oral every 12 hours  dextrose 5%. 1000 milliLiter(s) (50 mL/Hr) IV Continuous <Continuous>  dextrose 50% Injectable 12.5 Gram(s) IV Push once  dextrose 50% Injectable 25 Gram(s) IV Push once  dextrose 50% Injectable 25 Gram(s) IV Push once  enoxaparin Injectable 40 milliGRAM(s) SubCutaneous daily  furosemide    Tablet 20 milliGRAM(s) Oral daily  insulin lispro (HumaLOG) corrective regimen sliding scale   SubCutaneous three times a day before meals  levothyroxine 200 MICROGram(s) Oral daily  lidocaine   Patch 1 Patch Transdermal daily  sacubitril 49 mG/valsartan 51 mG 1 Tablet(s) Oral two times a day  spironolactone 25 milliGRAM(s) Oral daily  ticagrelor 90 milliGRAM(s) Oral two times a day  tiotropium 18 MICROgram(s) Capsule 1 Capsule(s) Inhalation daily    MEDICATIONS  (PRN):  albuterol/ipratropium for Nebulization. 3 milliLiter(s) Nebulizer every 6 hours PRN Shortness of Breath  dextrose 40% Gel 15 Gram(s) Oral once PRN Blood Glucose LESS THAN 70 milliGRAM(s)/deciliter  glucagon  Injectable 1 milliGRAM(s) IntraMuscular once PRN Glucose LESS THAN 70 milligrams/deciliter  traMADol 50 milliGRAM(s) Oral two times a day PRN Moderate Pain (4 - 6)      Allergies    No Known Allergies    Intolerances          LABS:                          11.5   6.19  )-----------( 261      ( 09 Dec 2019 06:13 )             37.7     12-10    138  |  102  |  32.0<H>  ----------------------------<  87  4.8   |  23.0  |  1.04    Ca    10.2      10 Dec 2019 06:21            RADIOLOGY & ADDITIONAL TESTS:

## 2019-12-10 NOTE — DISCHARGE NOTE PROVIDER - NSDCFUADDINST_GEN_ALL_CORE_FT
Continue medications as instructed, follow up with PMD and cardiology in 1-2 weeks. Follow up with cardiology regarding CHU. Return to ED for worsening complaints. Monitor weight daily.

## 2019-12-10 NOTE — PROGRESS NOTE ADULT - ATTENDING COMMENTS
Discussed with patient and  plan of care. Stable for discharge home.
Discussed with patient and cardiology.  Anticipate discharge in am if renal function normal.
Discussed with patient, RN and .
Discussed with patient and RN.

## 2019-12-10 NOTE — DISCHARGE NOTE PROVIDER - CARE PROVIDERS DIRECT ADDRESSES
,DirectAddress_Unknown,[email or web address] ,DirectAddress_Unknown,[email or web address],DirectAddress_Unknown

## 2019-12-10 NOTE — PROGRESS NOTE ADULT - ASSESSMENT
Assessment  Acute on chronic systolic mumur  Ischemic CM with decline in EF 30-35%  ? sig MR  Met lung Ca s/p chemo ? remission  s/p IWMI Cx PCI OSMAR 2/2019      Rec    OK to DC home  Continue present cardiac therapy  will get Holter monitor

## 2019-12-11 PROBLEM — J44.9 CHRONIC OBSTRUCTIVE PULMONARY DISEASE, UNSPECIFIED: Chronic | Status: ACTIVE | Noted: 2019-12-06

## 2019-12-11 RX ORDER — ATORVASTATIN CALCIUM 40 MG/1
40 TABLET, FILM COATED ORAL
Qty: 90 | Refills: 0 | Status: ACTIVE | COMMUNITY
Start: 2019-12-11

## 2019-12-11 RX ORDER — ASPIRIN 81 MG/1
81 TABLET ORAL DAILY
Qty: 30 | Refills: 2 | Status: ACTIVE | COMMUNITY
Start: 2019-12-11

## 2019-12-19 ENCOUNTER — APPOINTMENT (OUTPATIENT)
Dept: PULMONOLOGY | Facility: CLINIC | Age: 74
End: 2019-12-19
Payer: MEDICARE

## 2019-12-19 VITALS — WEIGHT: 158 LBS | HEIGHT: 61.5 IN | BODY MASS INDEX: 29.45 KG/M2

## 2019-12-19 VITALS — SYSTOLIC BLOOD PRESSURE: 118 MMHG | OXYGEN SATURATION: 98 % | HEART RATE: 48 BPM | DIASTOLIC BLOOD PRESSURE: 68 MMHG

## 2019-12-19 PROCEDURE — 99204 OFFICE O/P NEW MOD 45 MIN: CPT | Mod: 25

## 2019-12-19 PROCEDURE — 94010 BREATHING CAPACITY TEST: CPT

## 2019-12-19 NOTE — PHYSICAL EXAM
[General Appearance - Well Developed] : well developed [Neck Appearance] : the appearance of the neck was normal [Normal Conjunctiva] : the conjunctiva exhibited no abnormalities [General Appearance - Well Nourished] : well nourished [Heart Rate And Rhythm] : heart rate and rhythm were normal [Heart Sounds] : normal S1 and S2 [Edema] : no peripheral edema present [Murmurs] : no murmurs present [Respiration, Rhythm And Depth] : normal respiratory rhythm and effort [Exaggerated Use Of Accessory Muscles For Inspiration] : no accessory muscle use [Auscultation Breath Sounds / Voice Sounds] : lungs were clear to auscultation bilaterally [No Focal Deficits] : no focal deficits [] : no rash [Nail Clubbing] : no clubbing of the fingernails [Oriented To Time, Place, And Person] : oriented to person, place, and time [Impaired Insight] : insight and judgment were intact [Mood] : the mood was normal [Affect] : the affect was normal [Memory Recent] : recent memory was not impaired [FreeTextEntry1] : walks with cane

## 2019-12-19 NOTE — HISTORY OF PRESENT ILLNESS
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

## 2019-12-19 NOTE — DISCUSSION/SUMMARY
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 rescue therapy, she has been on Spiriva with no definite benefit per patient, can continue during cold and flu season\par Follow up closely by oncology, on immunotherapy\par Vaccinations this fall\par Followup in 6 months or sooner if needed from pulmonary perspective

## 2019-12-19 NOTE — PROCEDURE
[FreeTextEntry1] : CT 12/19: bilat eff, mil;d emphysema, stable RLL nodule, septal thickening\par BNP > 1000\par PET scan 4/19: no sig uptake\par hospital records and office notes reviewed\par  spirometry was normal flows, very mild obstruction\par \par

## 2020-06-22 ENCOUNTER — APPOINTMENT (OUTPATIENT)
Dept: PULMONOLOGY | Facility: CLINIC | Age: 75
End: 2020-06-22

## 2021-01-28 NOTE — H&P ADULT - PROBLEM SELECTOR PROBLEM 1
lvm for patient to call us back to schedule a 4 week f/u appt with Ewa Negron   Acute on chronic systolic congestive heart failure

## 2021-03-10 NOTE — ED ADULT TRIAGE NOTE - AS O2 DELIVERY
Daily Note     Today's date: 3/10/2021  Patient name: Zaida Last  : 1960  MRN: [id number]  Referring provider: Grant Knox DPM  Dx:   Encounter Diagnosis     ICD-10-CM    1  Primary osteoarthritis of right knee  M17 11    2  Patellofemoral disorder of right knee  M22 2X1    3  Patellofemoral disorder of left knee  M22 2X2                   Subjective: Pt reports her L posterior knee really hurts today 2* unknown cause      Objective: See treatment diary below      Assessment: Tolerated treatment well  Patient exhibited good technique with therapeutic exercises  Pt performed a NWB ex program today 2* increased posterior knee pain  Plan: Continue per plan of care  Dx: B knee pain/ OA  EPOC: 21  CO-MORBIDITIES: bipolar disorder, depression, chronic LBP  PERSONAL FACTORS: medicare insurance only  Precautions: none      Manuals 2/25 3/1 3/4 3/8 3/10        R knee PROM nk nk th JK L/R         R knee AP joint mobs   th          L knee posterior MFR     th        K tape for support   th JK L/R          10' 10' 10' 10' 10'                     Neuro Re-Ed             Step ups FW 4"x10 4"x10 4" x10 4" 10         sidestepping 10 10 3 laps  3 laps         Step ups lat 4"x10 4"x10 4"x10 6" x10 p!          Standing TKE [place] 10x OTB 10x GTB x10 B GTB x10 B         bridges 10x5" 10x5" 10x5"  10 x5"                                  Ther Ex             bike 6' 6' 6' 5' 1'        Standing HR 20 20 20 20         Stand gastroc stretch 3x20" 3x20" 1' B 1' B 1' supine        Sit to stand  8 p!   x10 reformer 10x chair         B Ankle TB 4 way  15x ea R ankle TB 4 way OTB           R toe curls             R great toe ext AROM             DLS: SLR toe straight 10 10  15 15        DLS; adductor ball squeeze 10x10" 10x10"   10 x10"        Quad sets   10x10" nv 10x10"        S/l hip abduction   10 nv         LAQ 10x10" np   10                                  Modalities             Cp post tx 10' 8'   10' B
supplemental O2

## 2021-04-28 ENCOUNTER — APPOINTMENT (OUTPATIENT)
Dept: NEPHROLOGY | Facility: CLINIC | Age: 76
End: 2021-04-28
Payer: MEDICARE

## 2021-04-28 VITALS
SYSTOLIC BLOOD PRESSURE: 132 MMHG | HEART RATE: 60 BPM | WEIGHT: 181 LBS | HEIGHT: 61.5 IN | DIASTOLIC BLOOD PRESSURE: 58 MMHG | BODY MASS INDEX: 33.74 KG/M2 | TEMPERATURE: 97.8 F

## 2021-04-28 PROCEDURE — 99204 OFFICE O/P NEW MOD 45 MIN: CPT

## 2021-04-28 RX ORDER — TIOTROPIUM BROMIDE 18 UG/1
18 CAPSULE ORAL; RESPIRATORY (INHALATION) DAILY
Qty: 1 | Refills: 3 | Status: DISCONTINUED | COMMUNITY
Start: 2019-12-11 | End: 2021-04-28

## 2021-04-28 RX ORDER — IPRATROPIUM BROMIDE AND ALBUTEROL SULFATE 2.5; .5 MG/3ML; MG/3ML
0.5-2.5 (3) SOLUTION RESPIRATORY (INHALATION)
Refills: 0 | Status: DISCONTINUED | COMMUNITY
End: 2021-04-28

## 2021-04-28 RX ORDER — TRAMADOL HYDROCHLORIDE 50 MG/1
50 TABLET, COATED ORAL
Refills: 0 | Status: DISCONTINUED | COMMUNITY
Start: 2019-12-11 | End: 2021-04-28

## 2021-04-28 NOTE — PHYSICAL EXAM
[Well Developed] : well developed [Well Nourished] : well nourished [Sclera] : the sclera and conjunctiva were normal [Hearing Threshold Finger Rub Not [place]] : hearing was normal [Examination Of The Oral Cavity] : the lips and gums were normal [Oropharynx] : the oropharynx was normal [Neck Appearance] : the appearance of the neck was normal [Neck Cervical Mass (___cm)] : no neck mass was observed [Jugular Venous Distention Increased] : there was no jugular-venous distention [Thyroid Diffuse Enlargement] : the thyroid was not enlarged [Normal Rhythm/Effort] : normal respiratory rhythm and effort [Clear Bilaterally] : the lungs were clear to auscultation bilaterally [Normal to Percussion] : the lungs were normal to percussion [Normal Rate] : normal [Normal S1] : normal S1 [Normal S2] : normal S2 [S3] : no S3 [S4] : no S4 [No Murmur] : no murmurs heard [No Pitting Edema] : no pitting edema present [Right Carotid Bruit] : no bruit heard over the right carotid [Left Carotid Bruit] : no bruit heard over the left carotid [Right Femoral Bruit] : no bruit heard over the right femoral artery [Left Femoral Bruit] : no bruit heard over the left femoral artery [2+] : left 2+ [No Abnormalities] : the abdominal aorta was not enlarged and no bruit was heard [Full Pulse] : the pedal pulses are present [Edema] : there was no peripheral edema [Normal] : normal [Soft, Nontender] : the abdomen was soft and nontender [No Mass] : no masses were palpated [No HSM] : no hepatosplenomegaly noted [No CVA Tenderness] : no ~M costovertebral angle tenderness [No Spinal Tenderness] : no spinal tenderness [Abnormal Walk] : normal gait [Nail Clubbing] : no clubbing  or cyanosis of the fingernails [Musculoskeletal - Swelling] : no joint swelling seen [Motor Tone] : muscle strength and tone were normal [] : no rash [Sensation] : the sensory exam was normal to light touch and pinprick [Motor Exam] : the motor exam was normal [No Focal Deficits] : no focal deficits [Impaired Insight] : insight and judgment were intact [Oriented To Time, Place, And Person] : oriented to person, place, and time [Affect] : the affect was normal [Mood] : the mood was normal

## 2021-04-28 NOTE — HISTORY OF PRESENT ILLNESS
[FreeTextEntry1] : Pt presents to office today for initial consult for abnormal renal labs, as referred by PCP. Pt denies dark, foamy urine, hematuria, dysuria, flank pain or alteration in voiding frequency or volume. Reports feeling relatively well. PMH includes Neoplastic disease, Anemia, Anxiety, ASHD, CKD 3, HTN, HLD, Overweight

## 2021-04-28 NOTE — ASSESSMENT
[FreeTextEntry1] : Pt visibly well appearing, in no acute distress. Reports feeling well. Denies hematuria, dysuria, dark foamy urine, flank pain or alteration in voiding frequency or volume. No edema present.\par \par CKD 3\par labs reviewed and discussed with patient\par BUN 73, SCr 3.79, eGFR 11\par RX for repeat labs given to patient today\par Renal US to be obtained at this time\par \par CAD\par continue statin\par \par HTN\par Lasix 20mg PO QD\par Metoprolol ER 25mg PO QD\par Carvedilol 3.125mg PO BID\par Controlled\par \par Anemia\par Hgb stable\par following hemo/onc\par \par Medications reconciled in office with patient today\par \par Return for F/U in 1 month

## 2021-05-18 ENCOUNTER — APPOINTMENT (OUTPATIENT)
Dept: ULTRASOUND IMAGING | Facility: CLINIC | Age: 76
End: 2021-05-18
Payer: MEDICARE

## 2021-05-18 ENCOUNTER — OUTPATIENT (OUTPATIENT)
Dept: OUTPATIENT SERVICES | Facility: HOSPITAL | Age: 76
LOS: 1 days | End: 2021-05-18
Payer: MEDICARE

## 2021-05-18 DIAGNOSIS — N18.30 CHRONIC KIDNEY DISEASE, STAGE 3 UNSPECIFIED: ICD-10-CM

## 2021-05-18 DIAGNOSIS — Z98.890 OTHER SPECIFIED POSTPROCEDURAL STATES: Chronic | ICD-10-CM

## 2021-05-18 PROCEDURE — 76775 US EXAM ABDO BACK WALL LIM: CPT

## 2021-05-18 PROCEDURE — 76775 US EXAM ABDO BACK WALL LIM: CPT | Mod: 26

## 2021-05-20 ENCOUNTER — NON-APPOINTMENT (OUTPATIENT)
Age: 76
End: 2021-05-20

## 2021-06-02 ENCOUNTER — APPOINTMENT (OUTPATIENT)
Dept: NEPHROLOGY | Facility: CLINIC | Age: 76
End: 2021-06-02
Payer: MEDICARE

## 2021-06-02 VITALS
WEIGHT: 169 LBS | HEIGHT: 61 IN | SYSTOLIC BLOOD PRESSURE: 108 MMHG | OXYGEN SATURATION: 97 % | DIASTOLIC BLOOD PRESSURE: 56 MMHG | BODY MASS INDEX: 31.91 KG/M2 | HEART RATE: 51 BPM

## 2021-06-02 PROCEDURE — 99215 OFFICE O/P EST HI 40 MIN: CPT

## 2021-06-02 RX ORDER — FUROSEMIDE 20 MG/1
20 TABLET ORAL DAILY
Refills: 0 | Status: DISCONTINUED | COMMUNITY
Start: 2019-12-11 | End: 2021-06-02

## 2021-06-02 RX ORDER — SACUBITRIL AND VALSARTAN 49; 51 MG/1; MG/1
49-51 TABLET, FILM COATED ORAL TWICE DAILY
Refills: 0 | Status: DISCONTINUED | COMMUNITY
Start: 2019-12-11 | End: 2021-06-02

## 2021-06-02 NOTE — HISTORY OF PRESENT ILLNESS
[FreeTextEntry1] : 76 year old woman with lung ca , bone meds, s/p Opdivo (last dose in ? 2020),  Anemia, Anxiety, ASHD, CKD 3, HTN, HLD, CHFrEF (30%)\par Pt seen and examined\par Feels well\par No acute event\par Follows with Dr. Wagner; getting PET scan q3 months\par \par

## 2021-06-02 NOTE — PHYSICAL EXAM
[Respiration, Rhythm And Depth] : normal respiratory rhythm and effort [Auscultation Breath Sounds / Voice Sounds] : lungs were clear to auscultation bilaterally [Sclera] : the sclera and conjunctiva were normal [Hearing Threshold Finger Rub Not [place]] : hearing was normal [Examination Of The Oral Cavity] : the lips and gums were normal [Oropharynx] : the oropharynx was normal [Neck Appearance] : the appearance of the neck was normal [Neck Cervical Mass (___cm)] : no neck mass was observed [Jugular Venous Distention Increased] : there was no jugular-venous distention [Thyroid Diffuse Enlargement] : the thyroid was not enlarged [Full Pulse] : the pedal pulses are present [Edema] : there was no peripheral edema [No CVA Tenderness] : no ~M costovertebral angle tenderness [No Spinal Tenderness] : no spinal tenderness [Abnormal Walk] : normal gait [Nail Clubbing] : no clubbing  or cyanosis of the fingernails [Musculoskeletal - Swelling] : no joint swelling seen [Motor Tone] : muscle strength and tone were normal [] : no rash [Sensation] : the sensory exam was normal to light touch and pinprick [Motor Exam] : the motor exam was normal [No Focal Deficits] : no focal deficits [Oriented To Time, Place, And Person] : oriented to person, place, and time [Impaired Insight] : insight and judgment were intact [Affect] : the affect was normal [Mood] : the mood was normal [Well Developed] : well developed [Well Nourished] : well nourished [Normal Rhythm/Effort] : normal respiratory rhythm and effort [Clear Bilaterally] : the lungs were clear to auscultation bilaterally [Normal to Percussion] : the lungs were normal to percussion [Normal Rate] : normal [Normal S1] : normal S1 [Normal S2] : normal S2 [No Murmur] : no murmurs heard [No Pitting Edema] : no pitting edema present [2+] : left 2+ [No Abnormalities] : the abdominal aorta was not enlarged and no bruit was heard [Normal] : normal [Soft, Nontender] : the abdomen was soft and nontender [No Mass] : no masses were palpated [No HSM] : no hepatosplenomegaly noted [S3] : no S3 [S4] : no S4 [Right Carotid Bruit] : no bruit heard over the right carotid [Left Carotid Bruit] : no bruit heard over the left carotid [Right Femoral Bruit] : no bruit heard over the right femoral artery [Left Femoral Bruit] : no bruit heard over the left femoral artery

## 2021-06-02 NOTE — PHYSICAL EXAM
[Respiration, Rhythm And Depth] : normal respiratory rhythm and effort [Auscultation Breath Sounds / Voice Sounds] : lungs were clear to auscultation bilaterally [Sclera] : the sclera and conjunctiva were normal [Hearing Threshold Finger Rub Not [place]] : hearing was normal [Examination Of The Oral Cavity] : the lips and gums were normal [Oropharynx] : the oropharynx was normal [Neck Appearance] : the appearance of the neck was normal [Neck Cervical Mass (___cm)] : no neck mass was observed [Jugular Venous Distention Increased] : there was no jugular-venous distention [Thyroid Diffuse Enlargement] : the thyroid was not enlarged [Full Pulse] : the pedal pulses are present [Edema] : there was no peripheral edema [No CVA Tenderness] : no ~M costovertebral angle tenderness [No Spinal Tenderness] : no spinal tenderness [Abnormal Walk] : normal gait [Nail Clubbing] : no clubbing  or cyanosis of the fingernails [Musculoskeletal - Swelling] : no joint swelling seen [Motor Tone] : muscle strength and tone were normal [] : no rash [Sensation] : the sensory exam was normal to light touch and pinprick [Motor Exam] : the motor exam was normal [No Focal Deficits] : no focal deficits [Oriented To Time, Place, And Person] : oriented to person, place, and time [Impaired Insight] : insight and judgment were intact [Affect] : the affect was normal [Mood] : the mood was normal [Well Developed] : well developed [Well Nourished] : well nourished [Normal Rhythm/Effort] : normal respiratory rhythm and effort [Clear Bilaterally] : the lungs were clear to auscultation bilaterally [Normal to Percussion] : the lungs were normal to percussion [Normal Rate] : normal [Normal S1] : normal S1 [Normal S2] : normal S2 [No Murmur] : no murmurs heard [No Pitting Edema] : no pitting edema present [2+] : left 2+ [No Abnormalities] : the abdominal aorta was not enlarged and no bruit was heard [Normal] : normal [Soft, Nontender] : the abdomen was soft and nontender [No Mass] : no masses were palpated [No HSM] : no hepatosplenomegaly noted [S3] : no S3 [S4] : no S4 [Right Carotid Bruit] : no bruit heard over the right carotid [Left Carotid Bruit] : no bruit heard over the left carotid [Right Femoral Bruit] : no bruit heard over the right femoral artery [Left Femoral Bruit] : no bruit heard over the left femoral artery

## 2021-06-02 NOTE — ASSESSMENT
[FreeTextEntry1] : David on CKD 3\par Baseline SCr ~ 1.1 in Dec 2019, pt with previous David with Scr peaking at 1.7\par unknown renal indices in 2020\par Scr was worse at 3.7 earlier this year; remains elevated at  3.0 on recent labs\par Unclear etiology of David\par Renal US with b/l cysts, non obstructing calculus\par Hold Entresto and lasix for 3 days\par Repeat BMP \par \par HTN: bp stable\par Continue Carvedilol and SPLT\par Holding ARB and lasix\par \par Metastatic lung cancer s/p Opdivo (now off for more than a year)\par in remission\par Onc follow up\par \par \par f/u 1 month

## 2021-07-07 ENCOUNTER — APPOINTMENT (OUTPATIENT)
Dept: NEPHROLOGY | Facility: CLINIC | Age: 76
End: 2021-07-07
Payer: MEDICARE

## 2021-07-07 VITALS
HEIGHT: 61 IN | DIASTOLIC BLOOD PRESSURE: 76 MMHG | RESPIRATION RATE: 16 BRPM | HEART RATE: 53 BPM | BODY MASS INDEX: 34.74 KG/M2 | OXYGEN SATURATION: 97 % | WEIGHT: 184 LBS | SYSTOLIC BLOOD PRESSURE: 160 MMHG

## 2021-07-07 PROCEDURE — 36415 COLL VENOUS BLD VENIPUNCTURE: CPT

## 2021-07-07 PROCEDURE — 99215 OFFICE O/P EST HI 40 MIN: CPT | Mod: 25

## 2021-07-07 RX ORDER — SPIRONOLACTONE 25 MG/1
25 TABLET ORAL
Qty: 30 | Refills: 0 | Status: DISCONTINUED | COMMUNITY
Start: 2019-12-11 | End: 2021-07-07

## 2021-07-07 NOTE — PHYSICAL EXAM
[Sclera] : the sclera and conjunctiva were normal [Hearing Threshold Finger Rub Not [place]] : hearing was normal [Examination Of The Oral Cavity] : the lips and gums were normal [Oropharynx] : the oropharynx was normal [Neck Appearance] : the appearance of the neck was normal [Neck Cervical Mass (___cm)] : no neck mass was observed [Jugular Venous Distention Increased] : there was no jugular-venous distention [Thyroid Diffuse Enlargement] : the thyroid was not enlarged [Full Pulse] : the pedal pulses are present [Edema] : there was no peripheral edema [No CVA Tenderness] : no ~M costovertebral angle tenderness [No Spinal Tenderness] : no spinal tenderness [Abnormal Walk] : normal gait [Nail Clubbing] : no clubbing  or cyanosis of the fingernails [Musculoskeletal - Swelling] : no joint swelling seen [Motor Tone] : muscle strength and tone were normal [] : no rash [Sensation] : the sensory exam was normal to light touch and pinprick [Motor Exam] : the motor exam was normal [No Focal Deficits] : no focal deficits [Oriented To Time, Place, And Person] : oriented to person, place, and time [Impaired Insight] : insight and judgment were intact [Affect] : the affect was normal [Mood] : the mood was normal [Well Developed] : well developed [Well Nourished] : well nourished [Normal Rhythm/Effort] : normal respiratory rhythm and effort [Clear Bilaterally] : the lungs were clear to auscultation bilaterally [Normal to Percussion] : the lungs were normal to percussion [Normal Rate] : normal [Normal S1] : normal S1 [Normal S2] : normal S2 [No Murmur] : no murmurs heard [No Pitting Edema] : no pitting edema present [No Abnormalities] : the abdominal aorta was not enlarged and no bruit was heard [2+] : left 2+ [Normal] : normal [Soft, Nontender] : the abdomen was soft and nontender [No Mass] : no masses were palpated [No HSM] : no hepatosplenomegaly noted [General Appearance - Alert] : alert [General Appearance - In No Acute Distress] : in no acute distress [S3] : no S3 [S4] : no S4 [Right Carotid Bruit] : no bruit heard over the right carotid [Left Carotid Bruit] : no bruit heard over the left carotid [Right Femoral Bruit] : no bruit heard over the right femoral artery [Left Femoral Bruit] : no bruit heard over the left femoral artery

## 2021-07-07 NOTE — ASSESSMENT
[FreeTextEntry1] : CKD (chronic kidney disease), stage III (585.3) (N18.30)\par \par David on CKD 3\par Baseline SCr ~ 1.1 in Dec 2019, pt with previous David with Scr peaking at 1.7\par unknown renal indices in 2020\par Scr was worse at 3.7 earlier this year; remains elevated at 3.0 on recent labs-> 3.3\par Unclear etiology of David\par Renal US with b/l cysts, non obstructing calculus\par Hold Entresto and SPLT\par Repeat BMP \par \par HTN: bp stable\par Continue Carvedilol and Lasix\par Holding ARB and SPLT\par \par Metastatic lung cancer s/p Opdivo (now off for more than a year)\par in remission\par Onc follow up\par \par Hypercalcemia, elevated PTH\par ? PHPT\par Will do Myeloma work up given anemia, David and hypercalcemia\par \par \par f/u 6 weeks. \par

## 2021-07-07 NOTE — HISTORY OF PRESENT ILLNESS
[FreeTextEntry1] : 76 year old woman with lung ca , bone meds, s/p Opdivo (last dose in ? 2020), Anemia, Anxiety, ASHD, CKD 3, HTN, HLD, CHFrEF (30%)\par Pt seen and examined\par Feels well\par No acute event\par Follows with Dr. Wagner; getting PET scan q3 months\par \par off Entresto; SPLT was held by Dr. Hernandez\par Continues to take Lasix 20 mg daily

## 2021-07-08 DIAGNOSIS — E87.5 HYPERKALEMIA: ICD-10-CM

## 2021-07-10 LAB
ALBUMIN SERPL ELPH-MCNC: 4.2 G/DL
ANION GAP SERPL CALC-SCNC: 18 MMOL/L
APPEARANCE: CLEAR
BACTERIA: NEGATIVE
BASOPHILS # BLD AUTO: 0.13 K/UL
BASOPHILS NFR BLD AUTO: 1.7 %
BILIRUBIN URINE: NEGATIVE
BLOOD URINE: NEGATIVE
BUN SERPL-MCNC: 50 MG/DL
CALCIUM SERPL-MCNC: 10.8 MG/DL
CHLORIDE SERPL-SCNC: 106 MMOL/L
CO2 SERPL-SCNC: 20 MMOL/L
COLOR: YELLOW
CREAT SERPL-MCNC: 2.75 MG/DL
CREAT SPEC-SCNC: 137 MG/DL
CREAT/PROT UR: 0.4 RATIO
DEPRECATED KAPPA LC FREE/LAMBDA SER: 2.05 RATIO
EOSINOPHIL # BLD AUTO: 0.85 K/UL
EOSINOPHIL NFR BLD AUTO: 11 %
GLUCOSE QUALITATIVE U: NEGATIVE
GLUCOSE SERPL-MCNC: 90 MG/DL
HCT VFR BLD CALC: 38.9 %
HGB BLD-MCNC: 11.3 G/DL
HYALINE CASTS: 3 /LPF
IMM GRANULOCYTES NFR BLD AUTO: 3.5 %
KAPPA LC CSF-MCNC: 3.48 MG/DL
KAPPA LC SERPL-MCNC: 7.15 MG/DL
KETONES URINE: NEGATIVE
LEUKOCYTE ESTERASE URINE: NEGATIVE
LYMPHOCYTES # BLD AUTO: 1.56 K/UL
LYMPHOCYTES NFR BLD AUTO: 20.1 %
MAN DIFF?: NORMAL
MCHC RBC-ENTMCNC: 29 GM/DL
MCHC RBC-ENTMCNC: 29 PG
MCV RBC AUTO: 99.7 FL
MICROSCOPIC-UA: NORMAL
MONOCYTES # BLD AUTO: 0.68 K/UL
MONOCYTES NFR BLD AUTO: 8.8 %
NEUTROPHILS # BLD AUTO: 4.27 K/UL
NEUTROPHILS NFR BLD AUTO: 54.9 %
NITRITE URINE: NEGATIVE
PH URINE: 5.5
PHOSPHATE SERPL-MCNC: 4.8 MG/DL
PLATELET # BLD AUTO: 278 K/UL
POTASSIUM SERPL-SCNC: 5.9 MMOL/L
PROT UR-MCNC: 49 MG/DL
PROTEIN URINE: NEGATIVE
RBC # BLD: 3.9 M/UL
RBC # FLD: 16.5 %
RED BLOOD CELLS URINE: 1 /HPF
SODIUM SERPL-SCNC: 143 MMOL/L
SPECIFIC GRAVITY URINE: 1.02
SQUAMOUS EPITHELIAL CELLS: 4 /HPF
UROBILINOGEN URINE: NORMAL
WBC # FLD AUTO: 7.76 K/UL
WHITE BLOOD CELLS URINE: 0 /HPF

## 2021-07-13 LAB
ALBUMIN MFR SERPL ELPH: 58.5 %
ALBUMIN SERPL-MCNC: 4 G/DL
ALBUMIN/GLOB SERPL: 1.4 RATIO
ALPHA1 GLOB MFR SERPL ELPH: 4.8 %
ALPHA1 GLOB SERPL ELPH-MCNC: 0.3 G/DL
ALPHA2 GLOB MFR SERPL ELPH: 12.6 %
ALPHA2 GLOB SERPL ELPH-MCNC: 0.9 G/DL
B-GLOBULIN MFR SERPL ELPH: 10.4 %
B-GLOBULIN SERPL ELPH-MCNC: 0.7 G/DL
GAMMA GLOB FLD ELPH-MCNC: 0.9 G/DL
GAMMA GLOB MFR SERPL ELPH: 13.7 %
INTERPRETATION SERPL IEP-IMP: NORMAL
M PROTEIN SPEC IFE-MCNC: NORMAL
PROT SERPL-MCNC: 6.9 G/DL
PROT SERPL-MCNC: 6.9 G/DL

## 2021-07-19 LAB
ALBUMIN SERPL ELPH-MCNC: 4 G/DL
ANION GAP SERPL CALC-SCNC: 12 MMOL/L
BUN SERPL-MCNC: 46 MG/DL
CALCIUM SERPL-MCNC: 10.2 MG/DL
CHLORIDE SERPL-SCNC: 106 MMOL/L
CO2 SERPL-SCNC: 21 MMOL/L
CREAT SERPL-MCNC: 2.48 MG/DL
GLUCOSE SERPL-MCNC: 91 MG/DL
PHOSPHATE SERPL-MCNC: 3.8 MG/DL
POTASSIUM SERPL-SCNC: 4.1 MMOL/L
SODIUM SERPL-SCNC: 139 MMOL/L

## 2021-08-25 ENCOUNTER — APPOINTMENT (OUTPATIENT)
Dept: NEPHROLOGY | Facility: CLINIC | Age: 76
End: 2021-08-25
Payer: MEDICARE

## 2021-08-25 VITALS
SYSTOLIC BLOOD PRESSURE: 114 MMHG | BODY MASS INDEX: 32.85 KG/M2 | WEIGHT: 174 LBS | OXYGEN SATURATION: 96 % | HEART RATE: 50 BPM | DIASTOLIC BLOOD PRESSURE: 68 MMHG | HEIGHT: 61 IN

## 2021-08-25 PROCEDURE — 99215 OFFICE O/P EST HI 40 MIN: CPT

## 2021-08-25 RX ORDER — CARVEDILOL 3.12 MG/1
3.12 TABLET, FILM COATED ORAL TWICE DAILY
Refills: 0 | Status: DISCONTINUED | COMMUNITY
Start: 2019-12-11 | End: 2021-08-25

## 2021-08-25 NOTE — PHYSICAL EXAM
[General Appearance - Alert] : alert [General Appearance - In No Acute Distress] : in no acute distress [Sclera] : the sclera and conjunctiva were normal [Hearing Threshold Finger Rub Not [place]] : hearing was normal [Examination Of The Oral Cavity] : the lips and gums were normal [Oropharynx] : the oropharynx was normal [Neck Appearance] : the appearance of the neck was normal [Neck Cervical Mass (___cm)] : no neck mass was observed [Jugular Venous Distention Increased] : there was no jugular-venous distention [Full Pulse] : the pedal pulses are present [Thyroid Diffuse Enlargement] : the thyroid was not enlarged [Edema] : there was no peripheral edema [No CVA Tenderness] : no ~M costovertebral angle tenderness [No Spinal Tenderness] : no spinal tenderness [Abnormal Walk] : normal gait [Nail Clubbing] : no clubbing  or cyanosis of the fingernails [Musculoskeletal - Swelling] : no joint swelling seen [Motor Tone] : muscle strength and tone were normal [] : no rash [Motor Exam] : the motor exam was normal [Sensation] : the sensory exam was normal to light touch and pinprick [No Focal Deficits] : no focal deficits [Oriented To Time, Place, And Person] : oriented to person, place, and time [Impaired Insight] : insight and judgment were intact [Affect] : the affect was normal [Mood] : the mood was normal [Clear Bilaterally] : the lungs were clear to auscultation bilaterally [Normal to Percussion] : the lungs were normal to percussion [Normal] : palpation of the chest was normal [Normal Rate] : normal [No Murmur] : no murmurs heard [Auscultation Breath Sounds / Voice Sounds] : lungs were clear to auscultation bilaterally [Heart Sounds] : normal S1 and S2 [Abdomen Soft] : soft [Abdomen Tenderness] : non-tender [S4] : no S4

## 2021-08-25 NOTE — ASSESSMENT
[FreeTextEntry1] : \par \par David on CKD 3\par Baseline SCr ~ 1.1 in Dec 2019, pt with previous David with Scr peaking at 1.7\par unknown renal indices in 2020\par Scr was worse at 3.7 earlier this year; remains elevated at 3.0 on recent labs-> 3.3\par Scr improving off Entresto and SPLT; she is now back on Entresto and scr is stable at 2.2\par Renal US with b/l cysts, non obstructing calculus\par \par HTN: bp stable\par Continue Carvedilol and Lasix\par Holding ARB and SPLT\par \par Metastatic lung cancer s/p Opdivo (now off for more than a year)\par in remission\par Onc follow up\par \par Hypercalcemia, elevated PTH\par ? PHPT\par Myeloma work up negative\par SCa++ remains mildly high at 10.6\par Vitamin D levels ~36; cut back on Vitamin D 5000 IU (on 10,000 IU daily)\par \par f/u in 3 months\par repeat labs prior to appt

## 2021-09-01 RX ORDER — SACUBITRIL AND VALSARTAN 24; 26 MG/1; MG/1
24-26 TABLET, FILM COATED ORAL
Refills: 0 | Status: DISCONTINUED | COMMUNITY
End: 2021-09-01

## 2021-11-17 ENCOUNTER — APPOINTMENT (OUTPATIENT)
Dept: NEPHROLOGY | Facility: CLINIC | Age: 76
End: 2021-11-17

## 2022-01-03 ENCOUNTER — INPATIENT (INPATIENT)
Facility: HOSPITAL | Age: 77
LOS: 1 days | Discharge: HOME | DRG: 291 | End: 2022-01-05
Attending: INTERNAL MEDICINE | Admitting: HOSPITALIST
Payer: MEDICARE

## 2022-01-03 VITALS
TEMPERATURE: 98 F | RESPIRATION RATE: 30 BRPM | SYSTOLIC BLOOD PRESSURE: 178 MMHG | HEIGHT: 67 IN | OXYGEN SATURATION: 96 % | DIASTOLIC BLOOD PRESSURE: 77 MMHG | HEART RATE: 115 BPM

## 2022-01-03 DIAGNOSIS — Z98.890 OTHER SPECIFIED POSTPROCEDURAL STATES: Chronic | ICD-10-CM

## 2022-01-03 DIAGNOSIS — J96.21 ACUTE AND CHRONIC RESPIRATORY FAILURE WITH HYPOXIA: ICD-10-CM

## 2022-01-03 LAB
ALBUMIN SERPL ELPH-MCNC: 3.9 G/DL — SIGNIFICANT CHANGE UP (ref 3.3–5.2)
ALP SERPL-CCNC: 105 U/L — SIGNIFICANT CHANGE UP (ref 40–120)
ALT FLD-CCNC: 11 U/L — SIGNIFICANT CHANGE UP
ANION GAP SERPL CALC-SCNC: 13 MMOL/L — SIGNIFICANT CHANGE UP (ref 5–17)
ANION GAP SERPL CALC-SCNC: 13 MMOL/L — SIGNIFICANT CHANGE UP (ref 5–17)
APPEARANCE UR: CLEAR — SIGNIFICANT CHANGE UP
AST SERPL-CCNC: 16 U/L — SIGNIFICANT CHANGE UP
BASE EXCESS BLDV CALC-SCNC: -4.6 MMOL/L — LOW (ref -2–3)
BASE EXCESS BLDV CALC-SCNC: -8.7 MMOL/L — LOW (ref -2–3)
BASOPHILS # BLD AUTO: 0.06 K/UL — SIGNIFICANT CHANGE UP (ref 0–0.2)
BASOPHILS # BLD AUTO: 0.17 K/UL — SIGNIFICANT CHANGE UP (ref 0–0.2)
BASOPHILS NFR BLD AUTO: 0.9 % — SIGNIFICANT CHANGE UP (ref 0–2)
BASOPHILS NFR BLD AUTO: 1.2 % — SIGNIFICANT CHANGE UP (ref 0–2)
BILIRUB SERPL-MCNC: 0.8 MG/DL — SIGNIFICANT CHANGE UP (ref 0.4–2)
BILIRUB UR-MCNC: NEGATIVE — SIGNIFICANT CHANGE UP
BUN SERPL-MCNC: 16.4 MG/DL — SIGNIFICANT CHANGE UP (ref 8–20)
BUN SERPL-MCNC: 18.4 MG/DL — SIGNIFICANT CHANGE UP (ref 8–20)
CA-I SERPL-SCNC: 1.33 MMOL/L — SIGNIFICANT CHANGE UP (ref 1.15–1.33)
CA-I SERPL-SCNC: 1.37 MMOL/L — HIGH (ref 1.15–1.33)
CALCIUM SERPL-MCNC: 10 MG/DL — SIGNIFICANT CHANGE UP (ref 8.6–10.2)
CALCIUM SERPL-MCNC: 10.3 MG/DL — HIGH (ref 8.6–10.2)
CHLORIDE BLDV-SCNC: 108 MMOL/L — HIGH (ref 98–107)
CHLORIDE BLDV-SCNC: 109 MMOL/L — HIGH (ref 98–107)
CHLORIDE SERPL-SCNC: 105 MMOL/L — SIGNIFICANT CHANGE UP (ref 98–107)
CHLORIDE SERPL-SCNC: 106 MMOL/L — SIGNIFICANT CHANGE UP (ref 98–107)
CO2 SERPL-SCNC: 18 MMOL/L — LOW (ref 22–29)
CO2 SERPL-SCNC: 19 MMOL/L — LOW (ref 22–29)
COLOR SPEC: YELLOW — SIGNIFICANT CHANGE UP
CREAT SERPL-MCNC: 1.16 MG/DL — SIGNIFICANT CHANGE UP (ref 0.5–1.3)
CREAT SERPL-MCNC: 1.33 MG/DL — HIGH (ref 0.5–1.3)
DIFF PNL FLD: NEGATIVE — SIGNIFICANT CHANGE UP
EOSINOPHIL # BLD AUTO: 0.26 K/UL — SIGNIFICANT CHANGE UP (ref 0–0.5)
EOSINOPHIL # BLD AUTO: 0.88 K/UL — HIGH (ref 0–0.5)
EOSINOPHIL NFR BLD AUTO: 3.7 % — SIGNIFICANT CHANGE UP (ref 0–6)
EOSINOPHIL NFR BLD AUTO: 6.3 % — HIGH (ref 0–6)
GAS PNL BLDV: 137 MMOL/L — SIGNIFICANT CHANGE UP (ref 136–145)
GAS PNL BLDV: 139 MMOL/L — SIGNIFICANT CHANGE UP (ref 136–145)
GAS PNL BLDV: SIGNIFICANT CHANGE UP
GLUCOSE BLDV-MCNC: 143 MG/DL — HIGH (ref 70–99)
GLUCOSE BLDV-MCNC: 261 MG/DL — HIGH (ref 70–99)
GLUCOSE SERPL-MCNC: 255 MG/DL — HIGH (ref 70–99)
GLUCOSE SERPL-MCNC: 96 MG/DL — SIGNIFICANT CHANGE UP (ref 70–99)
GLUCOSE UR QL: NEGATIVE MG/DL — SIGNIFICANT CHANGE UP
HCO3 BLDV-SCNC: 20 MMOL/L — LOW (ref 22–29)
HCO3 BLDV-SCNC: 22 MMOL/L — SIGNIFICANT CHANGE UP (ref 22–29)
HCT VFR BLD CALC: 32.3 % — LOW (ref 34.5–45)
HCT VFR BLD CALC: 39.1 % — SIGNIFICANT CHANGE UP (ref 34.5–45)
HCT VFR BLDA CALC: 31 % — LOW (ref 34–46)
HCT VFR BLDA CALC: 35 % — SIGNIFICANT CHANGE UP (ref 34–46)
HGB BLD CALC-MCNC: 10.3 G/DL — LOW (ref 11.7–16.1)
HGB BLD CALC-MCNC: 11.8 G/DL — SIGNIFICANT CHANGE UP (ref 11.7–16.1)
HGB BLD-MCNC: 11.5 G/DL — SIGNIFICANT CHANGE UP (ref 11.5–15.5)
HGB BLD-MCNC: 9.8 G/DL — LOW (ref 11.5–15.5)
IMM GRANULOCYTES NFR BLD AUTO: 0.3 % — SIGNIFICANT CHANGE UP (ref 0–1.5)
IMM GRANULOCYTES NFR BLD AUTO: 0.5 % — SIGNIFICANT CHANGE UP (ref 0–1.5)
KETONES UR-MCNC: NEGATIVE — SIGNIFICANT CHANGE UP
LACTATE BLDV-MCNC: 2.5 MMOL/L — HIGH (ref 0.5–2)
LACTATE BLDV-MCNC: 3.2 MMOL/L — HIGH (ref 0.5–2)
LEUKOCYTE ESTERASE UR-ACNC: NEGATIVE — SIGNIFICANT CHANGE UP
LYMPHOCYTES # BLD AUTO: 0.88 K/UL — LOW (ref 1–3.3)
LYMPHOCYTES # BLD AUTO: 12.6 % — LOW (ref 13–44)
LYMPHOCYTES # BLD AUTO: 33.5 % — SIGNIFICANT CHANGE UP (ref 13–44)
LYMPHOCYTES # BLD AUTO: 4.69 K/UL — HIGH (ref 1–3.3)
MAGNESIUM SERPL-MCNC: 2.2 MG/DL — SIGNIFICANT CHANGE UP (ref 1.6–2.6)
MAGNESIUM SERPL-MCNC: 2.2 MG/DL — SIGNIFICANT CHANGE UP (ref 1.6–2.6)
MCHC RBC-ENTMCNC: 26.1 PG — LOW (ref 27–34)
MCHC RBC-ENTMCNC: 26.4 PG — LOW (ref 27–34)
MCHC RBC-ENTMCNC: 29.4 GM/DL — LOW (ref 32–36)
MCHC RBC-ENTMCNC: 30.3 GM/DL — LOW (ref 32–36)
MCV RBC AUTO: 87.1 FL — SIGNIFICANT CHANGE UP (ref 80–100)
MCV RBC AUTO: 88.9 FL — SIGNIFICANT CHANGE UP (ref 80–100)
MONOCYTES # BLD AUTO: 0.64 K/UL — SIGNIFICANT CHANGE UP (ref 0–0.9)
MONOCYTES # BLD AUTO: 1.15 K/UL — HIGH (ref 0–0.9)
MONOCYTES NFR BLD AUTO: 8.2 % — SIGNIFICANT CHANGE UP (ref 2–14)
MONOCYTES NFR BLD AUTO: 9.2 % — SIGNIFICANT CHANGE UP (ref 2–14)
NEUTROPHILS # BLD AUTO: 5.1 K/UL — SIGNIFICANT CHANGE UP (ref 1.8–7.4)
NEUTROPHILS # BLD AUTO: 7.05 K/UL — SIGNIFICANT CHANGE UP (ref 1.8–7.4)
NEUTROPHILS NFR BLD AUTO: 50.3 % — SIGNIFICANT CHANGE UP (ref 43–77)
NEUTROPHILS NFR BLD AUTO: 73.3 % — SIGNIFICANT CHANGE UP (ref 43–77)
NITRITE UR-MCNC: NEGATIVE — SIGNIFICANT CHANGE UP
NT-PROBNP SERPL-SCNC: 4169 PG/ML — HIGH (ref 0–300)
PCO2 BLDV: 47 MMHG — HIGH (ref 39–42)
PCO2 BLDV: 56 MMHG — HIGH (ref 39–42)
PH BLDV: 7.16 — CRITICAL LOW (ref 7.32–7.43)
PH BLDV: 7.28 — LOW (ref 7.32–7.43)
PH UR: 6 — SIGNIFICANT CHANGE UP (ref 5–8)
PHOSPHATE SERPL-MCNC: 3.4 MG/DL — SIGNIFICANT CHANGE UP (ref 2.4–4.7)
PLATELET # BLD AUTO: 313 K/UL — SIGNIFICANT CHANGE UP (ref 150–400)
PLATELET # BLD AUTO: 452 K/UL — HIGH (ref 150–400)
PO2 BLDV: 131 MMHG — HIGH (ref 25–45)
PO2 BLDV: 53 MMHG — HIGH (ref 25–45)
POTASSIUM BLDV-SCNC: 3.4 MMOL/L — LOW (ref 3.5–5.1)
POTASSIUM BLDV-SCNC: 5.1 MMOL/L — SIGNIFICANT CHANGE UP (ref 3.5–5.1)
POTASSIUM SERPL-MCNC: 3.5 MMOL/L — SIGNIFICANT CHANGE UP (ref 3.5–5.3)
POTASSIUM SERPL-MCNC: 4.9 MMOL/L — SIGNIFICANT CHANGE UP (ref 3.5–5.3)
POTASSIUM SERPL-SCNC: 3.5 MMOL/L — SIGNIFICANT CHANGE UP (ref 3.5–5.3)
POTASSIUM SERPL-SCNC: 4.9 MMOL/L — SIGNIFICANT CHANGE UP (ref 3.5–5.3)
PROT SERPL-MCNC: 6.9 G/DL — SIGNIFICANT CHANGE UP (ref 6.6–8.7)
PROT UR-MCNC: NEGATIVE — SIGNIFICANT CHANGE UP
RAPID RVP RESULT: SIGNIFICANT CHANGE UP
RBC # BLD: 3.71 M/UL — LOW (ref 3.8–5.2)
RBC # BLD: 4.4 M/UL — SIGNIFICANT CHANGE UP (ref 3.8–5.2)
RBC # FLD: 17 % — HIGH (ref 10.3–14.5)
RBC # FLD: 17 % — HIGH (ref 10.3–14.5)
SAO2 % BLDV: 77.8 % — SIGNIFICANT CHANGE UP
SAO2 % BLDV: 99.2 % — SIGNIFICANT CHANGE UP
SARS-COV-2 RNA SPEC QL NAA+PROBE: SIGNIFICANT CHANGE UP
SODIUM SERPL-SCNC: 137 MMOL/L — SIGNIFICANT CHANGE UP (ref 135–145)
SODIUM SERPL-SCNC: 137 MMOL/L — SIGNIFICANT CHANGE UP (ref 135–145)
SP GR SPEC: 1.01 — SIGNIFICANT CHANGE UP (ref 1.01–1.02)
TROPONIN T SERPL-MCNC: 0.08 NG/ML — HIGH (ref 0–0.06)
TROPONIN T SERPL-MCNC: 0.11 NG/ML — HIGH (ref 0–0.06)
TROPONIN T SERPL-MCNC: <0.01 NG/ML — SIGNIFICANT CHANGE UP (ref 0–0.06)
UROBILINOGEN FLD QL: NEGATIVE MG/DL — SIGNIFICANT CHANGE UP
WBC # BLD: 14.01 K/UL — HIGH (ref 3.8–10.5)
WBC # BLD: 6.96 K/UL — SIGNIFICANT CHANGE UP (ref 3.8–10.5)
WBC # FLD AUTO: 14.01 K/UL — HIGH (ref 3.8–10.5)
WBC # FLD AUTO: 6.96 K/UL — SIGNIFICANT CHANGE UP (ref 3.8–10.5)

## 2022-01-03 PROCEDURE — 71045 X-RAY EXAM CHEST 1 VIEW: CPT | Mod: 26

## 2022-01-03 PROCEDURE — 99291 CRITICAL CARE FIRST HOUR: CPT

## 2022-01-03 PROCEDURE — 71250 CT THORAX DX C-: CPT | Mod: 26

## 2022-01-03 PROCEDURE — 93010 ELECTROCARDIOGRAM REPORT: CPT

## 2022-01-03 PROCEDURE — 93306 TTE W/DOPPLER COMPLETE: CPT | Mod: 26

## 2022-01-03 PROCEDURE — 99223 1ST HOSP IP/OBS HIGH 75: CPT

## 2022-01-03 RX ORDER — CHOLECALCIFEROL (VITAMIN D3) 125 MCG
2000 CAPSULE ORAL DAILY
Refills: 0 | Status: DISCONTINUED | OUTPATIENT
Start: 2022-01-03 | End: 2022-01-03

## 2022-01-03 RX ORDER — FUROSEMIDE 40 MG
40 TABLET ORAL
Refills: 0 | Status: DISCONTINUED | OUTPATIENT
Start: 2022-01-03 | End: 2022-01-03

## 2022-01-03 RX ORDER — ATORVASTATIN CALCIUM 80 MG/1
40 TABLET, FILM COATED ORAL AT BEDTIME
Refills: 0 | Status: DISCONTINUED | OUTPATIENT
Start: 2022-01-03 | End: 2022-01-05

## 2022-01-03 RX ORDER — FUROSEMIDE 40 MG
40 TABLET ORAL ONCE
Refills: 0 | Status: COMPLETED | OUTPATIENT
Start: 2022-01-03 | End: 2022-01-03

## 2022-01-03 RX ORDER — ACETAMINOPHEN 500 MG
650 TABLET ORAL EVERY 6 HOURS
Refills: 0 | Status: DISCONTINUED | OUTPATIENT
Start: 2022-01-03 | End: 2022-01-05

## 2022-01-03 RX ORDER — ASPIRIN/CALCIUM CARB/MAGNESIUM 324 MG
81 TABLET ORAL DAILY
Refills: 0 | Status: DISCONTINUED | OUTPATIENT
Start: 2022-01-03 | End: 2022-01-05

## 2022-01-03 RX ORDER — PREGABALIN 225 MG/1
1000 CAPSULE ORAL DAILY
Refills: 0 | Status: DISCONTINUED | OUTPATIENT
Start: 2022-01-03 | End: 2022-01-05

## 2022-01-03 RX ORDER — TIOTROPIUM BROMIDE 18 UG/1
1 CAPSULE ORAL; RESPIRATORY (INHALATION) DAILY
Refills: 0 | Status: DISCONTINUED | OUTPATIENT
Start: 2022-01-03 | End: 2022-01-05

## 2022-01-03 RX ORDER — METOPROLOL TARTRATE 50 MG
12.5 TABLET ORAL DAILY
Refills: 0 | Status: DISCONTINUED | OUTPATIENT
Start: 2022-01-03 | End: 2022-01-05

## 2022-01-03 RX ORDER — IPRATROPIUM/ALBUTEROL SULFATE 18-103MCG
3 AEROSOL WITH ADAPTER (GRAM) INHALATION EVERY 6 HOURS
Refills: 0 | Status: DISCONTINUED | OUTPATIENT
Start: 2022-01-03 | End: 2022-01-05

## 2022-01-03 RX ORDER — TICAGRELOR 90 MG/1
90 TABLET ORAL EVERY 12 HOURS
Refills: 0 | Status: DISCONTINUED | OUTPATIENT
Start: 2022-01-03 | End: 2022-01-05

## 2022-01-03 RX ORDER — TRAMADOL HYDROCHLORIDE 50 MG/1
50 TABLET ORAL EVERY 8 HOURS
Refills: 0 | Status: DISCONTINUED | OUTPATIENT
Start: 2022-01-03 | End: 2022-01-05

## 2022-01-03 RX ORDER — LEVOTHYROXINE SODIUM 125 MCG
200 TABLET ORAL DAILY
Refills: 0 | Status: DISCONTINUED | OUTPATIENT
Start: 2022-01-03 | End: 2022-01-05

## 2022-01-03 RX ORDER — SPIRONOLACTONE 25 MG/1
1 TABLET, FILM COATED ORAL
Qty: 0 | Refills: 0 | DISCHARGE

## 2022-01-03 RX ORDER — ONDANSETRON 8 MG/1
4 TABLET, FILM COATED ORAL ONCE
Refills: 0 | Status: COMPLETED | OUTPATIENT
Start: 2022-01-03 | End: 2022-01-03

## 2022-01-03 RX ORDER — SACUBITRIL AND VALSARTAN 24; 26 MG/1; MG/1
1 TABLET, FILM COATED ORAL
Refills: 0 | Status: DISCONTINUED | OUTPATIENT
Start: 2022-01-03 | End: 2022-01-05

## 2022-01-03 RX ORDER — FUROSEMIDE 40 MG
40 TABLET ORAL DAILY
Refills: 0 | Status: DISCONTINUED | OUTPATIENT
Start: 2022-01-04 | End: 2022-01-04

## 2022-01-03 RX ADMIN — PREGABALIN 1000 MICROGRAM(S): 225 CAPSULE ORAL at 12:44

## 2022-01-03 RX ADMIN — Medication 40 MILLIGRAM(S): at 17:35

## 2022-01-03 RX ADMIN — ONDANSETRON 4 MILLIGRAM(S): 8 TABLET, FILM COATED ORAL at 04:41

## 2022-01-03 RX ADMIN — Medication 81 MILLIGRAM(S): at 12:45

## 2022-01-03 RX ADMIN — Medication 40 MILLIGRAM(S): at 06:01

## 2022-01-03 RX ADMIN — Medication 1 TABLET(S): at 12:44

## 2022-01-03 RX ADMIN — Medication 2000 UNIT(S): at 12:45

## 2022-01-03 RX ADMIN — TICAGRELOR 90 MILLIGRAM(S): 90 TABLET ORAL at 17:32

## 2022-01-03 RX ADMIN — SACUBITRIL AND VALSARTAN 1 TABLET(S): 24; 26 TABLET, FILM COATED ORAL at 17:32

## 2022-01-03 RX ADMIN — Medication 12.5 MILLIGRAM(S): at 12:45

## 2022-01-03 RX ADMIN — ATORVASTATIN CALCIUM 40 MILLIGRAM(S): 80 TABLET, FILM COATED ORAL at 22:43

## 2022-01-03 RX ADMIN — Medication 3 MILLILITER(S): at 22:43

## 2022-01-03 RX ADMIN — Medication 650 MILLIGRAM(S): at 22:43

## 2022-01-03 NOTE — ED ADULT NURSE NOTE - [facility]
Visit Information Date & Time Provider Department Dept. Phone Encounter #  
 10/2/2017 11:15 AM Miley Wells MD [facility] [address] [id number] Upcoming Health Maintenance Date Due Pneumococcal 19-64 Medium Risk (1 of 1 - PPSV23) 9/22/2013 DTaP/Tdap/Td series (1 - Tdap) 9/22/2015 INFLUENZA AGE 9 TO ADULT 8/1/2017 Allergies as of 10/2/2017  Review Complete On: 10/2/2017 By: Miley Wells MD  
 No Known Allergies Current Immunizations  Never Reviewed No immunizations on file. Not reviewed this visit You Were Diagnosed With   
  
 Codes Comments Encounter to establish care with new doctor    -  Primary ICD-10-CM: Z76.89 
ICD-9-CM: V65.8 Wellness examination     ICD-10-CM: Z00.00 ICD-9-CM: V70.0 Tobacco abuse     ICD-10-CM: Z72.0 ICD-9-CM: 305.1 Vitals BP Pulse Temp Resp Height(growth percentile) Weight(growth percentile) 99/64 (BP 1 Location: Left arm, BP Patient Position: Sitting) 63 98 °F (36.7 °C) (Oral) 16 5' 8\" (1.727 m) 125 lb 9.6 oz (57 kg) SpO2 BMI Smoking Status 99% 19.1 kg/m2 Current Every Day Smoker Vitals History BMI and BSA Data Body Mass Index Body Surface Area  
 19.1 kg/m 2 1.65 m 2 Preferred Pharmacy Pharmacy Name Phone [id number] [address] [facility], [address] [place] [address]  [phone] Your Updated Medication List  
  
Notice  As of 10/2/2017 11:53 AM  
 You have not been prescribed any medications. Patient Instructions Well Visit, Ages 25 to 48: Care Instructions Your Care Instructions Physical exams can help you stay healthy. Your doctor has checked your overall health and may have suggested ways to take good care of yourself. He or she also may have recommended tests. At home, you can help prevent illness with healthy eating, regular exercise, and other steps. intact Follow-up care is a key part of your treatment and safety. Be sure to make and go to all appointments, and call your doctor if you are having problems. It's also a good idea to know your test results and keep a list of the medicines you take. How can you care for yourself at home? · Reach and stay at a healthy weight. This will lower your risk for many problems, such as obesity, diabetes, heart disease, and high blood pressure. · Get at least 30 minutes of physical activity on most days of the week. Walking is a good choice. You also may want to do other activities, such as running, swimming, cycling, or playing tennis or team sports. Discuss any changes in your exercise program with your doctor. · Do not smoke or allow others to smoke around you. If you need help quitting, talk to your doctor about stop-smoking programs and medicines. These can increase your chances of quitting for good. · Talk to your doctor about whether you have any risk factors for sexually transmitted infections (STIs). Having one sex partner (who does not have STIs and does not have sex with anyone else) is a good way to avoid these infections. · Use birth control if you do not want to have children at this time. Talk with your doctor about the choices available and what might be best for you. · Protect your skin from too much sun. When you're outdoors from 10 a.m. to 4 p.m., stay in the shade or cover up with clothing and a hat with a wide brim. Wear sunglasses that block UV rays. Even when it's cloudy, put broad-spectrum sunscreen (SPF 30 or higher) on any exposed skin. · See a dentist one or two times a year for checkups and to have your teeth cleaned. · Wear a seat belt in the car. · Drink alcohol in moderation, if at all. That means no more than 2 drinks a day for men and 1 drink a day for women. Follow your doctor's advice about when to have certain tests. These tests can spot problems early. For everyone · Cholesterol. Have the fat (cholesterol) in your blood tested after age 21. Your doctor will tell you how often to have this done based on your age, family history, or other things that can increase your risk for heart disease. · Blood pressure. Have your blood pressure checked during a routine doctor visit. Your doctor will tell you how often to check your blood pressure based on your age, your blood pressure results, and other factors. · Vision. Talk with your doctor about how often to have a glaucoma test. 
· Diabetes. Ask your doctor whether you should have tests for diabetes. · Colon cancer. Have a test for colon cancer at age 48. You may have one of several tests. If you are younger than 48, you may need a test earlier if you have any risk factors. Risk factors include whether you already had a precancerous polyp removed from your colon or whether your parent, brother, sister, or child has had colon cancer. For women · Breast exam and mammogram. Talk to your doctor about when you should have a clinical breast exam and a mammogram. Medical experts differ on whether and how often women under 50 should have these tests. Your doctor can help you decide what is right for you. · Pap test and pelvic exam. Begin Pap tests at age 24. A Pap test is the best way to find cervical cancer. The test often is part of a pelvic exam. Ask how often to have this test. 
· Tests for sexually transmitted infections (STIs). Ask whether you should have tests for STIs. You may be at risk if you have sex with more than one person, especially if your partners do not wear condoms. For men · Tests for sexually transmitted infections (STIs). Ask whether you should have tests for STIs. You may be at risk if you have sex with more than one person, especially if you do not wear a condom. · Testicular cancer exam. Ask your doctor whether you should check your testicles regularly. · Prostate exam. Talk to your doctor about whether you should have a blood test (called a PSA test) for prostate cancer. Experts differ on whether and when men should have this test. Some experts suggest it if you are older than 39 and are -American or have a father or brother who got prostate cancer when he was younger than 72. When should you call for help? Watch closely for changes in your health, and be sure to contact your doctor if you have any problems or symptoms that concern you. Where can you learn more? Go to http://angela-holli.info/. Enter P072 in the search box to learn more about \"Well Visit, Ages 25 to 48: Care Instructions. \" Current as of: July 19, 2016 Content Version: 11.3 © 8125-3011 [employer]. Care instructions adapted under license by [employer] (which disclaims liability or warranty for this information). If you have questions about a medical condition or this instruction, always ask your healthcare professional. [place] [address] any warranty or liability for your use of this information. Introducing \[facility] [place] [facility]\"" & HEALTH SERVICES! Muriel Gonzáles introduces [employer] patient portal. Now you can access parts of your medical record, email your doctor's office, and request medication refills online. 1. In your internet browser, go to [email or web address] [employer] 2. Click on the First Time User? Click Here link in the Sign In box. You will see the New Member Sign Up page. 3. Enter your [employer] Access Code exactly as it appears below. You will not need to use this code after youve completed the sign-up process. If you do not sign up before the expiration date, you must request a new code. · [employer] Access Code: [email or web address] Expires: 12/31/2017 10:50 AM 
 
4. Enter the last four digits of your Social Security Number (xxxx) and Date of Birth (mm/dd/yyyy) as indicated and click Submit.  You will be taken to the next sign-up page. 5. Create a [employer] ID. This will be your [employer] login ID and cannot be changed, so think of one that is secure and easy to remember. 6. Create a [employer] password. You can change your password at any time. 7. Enter your Password Reset Question and Answer. This can be used at a later time if you forget your password. 8. Enter your e-mail address. You will receive e-mail notification when new information is available in [address]. 9. Click Sign Up. You can now view and download portions of your medical record. 10. Click the Download Summary menu link to download a portable copy of your medical information. If you have questions, please visit the Frequently Asked Questions section of the [employer] website. Remember, [employer] is NOT to be used for urgent needs. For medical emergencies, dial 911. Now available from your iPhone and Android! Please provide this summary of care documentation to your next provider. Your primary care clinician is listed as James Villavicencio. If you have any questions after today's visit, please call [phone].

## 2022-01-03 NOTE — H&P ADULT - ASSESSMENT
Admit to telemetry     Acute on chronic systolic HFref  - pt with fluid overload, was hypoxic and had increased WOB requiring bipap support. Able to be weaned off of bipap to 3l via nc   - elevated probnp   - trend serial cardiac enzymes, trop x 1 negative, trop x2 elevated  to 0.11 likely secondary to fluid overload and hypoxia  - will c/w trending troponins   - cxr with b/l congestion and ct chest with   - s/p lasix 40mg IV x 1 dose  -c/w lasix 40mg IV BID x 24-48hrs then reassess  - monitor renal fxn and electrolytes closely on diuresis   - repeat bmp tonight  - f/u TTE   - prior tte per [facility] Dr. Tate with EF: 42%   - strict I/O monitoring, fluid restriction, daily standing weight       Chronic obstructive pulmonary disease  .      Problem/Plan - 3:  ·  Problem: Coronary artery disease involving native coronary artery of native heart without angina pectoris.  Plan: Continue antiplatelets, ECHO rodered.      Problem/Plan - 4:   76 y.o. Female with hx of HTN, HLD, moderate to severe MR CAD/MI ICM lasted PCI 2019 to OSMAR to LCx  of RCA left to right collaterals mild LAD/LM, Lung cancer baseline O2 therapy with METS s/p chemotherapy/ immunotherapy and radiation now in remission, COPD former smoker and chronic systolic HFrEF EF ~ 35 to 40% on chronic 3l via nc o2 qhs who presents with worsening sob, VILLARREAL and orthopnea. Noted to be hypoxic to low 80s in the ed with increased work of breathing, elevated probnp and noted b/l congestion on imaging. Pt admitted with acute resp failure with hypoxia 2/2 pulm edema 2/2 acute on chronic systolic HFref exacerbation.     Admit to telemetry     Acute on chronic systolic HFref  - pt with fluid overload, was hypoxic and had increased WOB requiring bipap support. Able to be weaned off of bipap to 3l via nc   - elevated probnp   - trend serial cardiac enzymes, trop x 1 negative, trop x2 elevated  to 0.11 likely secondary to fluid overload and hypoxia  - will c/w trending troponins   - cxr with b/l congestion and ct chest with   - s/p lasix 40mg IV x 1 dose  -c/w lasix 40mg IV BID x 24-48hrs then reassess  - monitor renal fxn and electrolytes closely on diuresis   - repeat bmp tonight  - f/u TTE   - prior tte per [facility] Dr. Tate with EF: 42%   - strict I/O monitoring, fluid restriction, daily standing weight       Chronic obstructive pulmonary disease  - stable - not in any exacerbation  - per the pt she reports she followed with a pulm outpt who reported she does not have a dx of COPD  - ct chest negative for emphysema - pt has hx of being former smoker and quit 10 yrs ago     Hx lung cancer  - with lesion in the stomach and R hip   -s/p chemo/ immunotherapy and radiation  - per pt she is in remission  - ct chest with stable noted middle lobe 1.5 cm nodule, unchanged dating back to 2015, compatible with a benign entity such as a granuloma or hamartoma.  -c/w duoneb prn   - pt to f/u outpt with pulm       Coronary artery disease  -c/w asa, brillinta and atorvastatin   - cardio started low dose metoprolol po bid     Chronic lower back pain and R hip pain   -hx herniated discs and R hip pain - arthritis   -c/w tylenol prn for mild pain  -c/w tramadol prn for severe pain     Hypothyroidism  -f/u tsh    -c/w synthroid po qd    DVT ppx  -c/w covered on DAPT     Activity level: increase as tolerated with cane  Independent in ADLs at baseline    Dispo: pt currently remains acute requiring inpatient management for hypoxia/ fluid overload.  76 y.o. Female with hx of HTN, HLD, moderate to severe MR CAD/MI ICM lasted PCI 2019 to [facility]AR to LCx  of RCA left to right collaterals mild LAD/LM, Lung cancer baseline O2 therapy with METS s/p chemotherapy/ immunotherapy and radiation now in remission, COPD former smoker and chronic systolic HFrEF EF ~ 35 to 40% on chronic 3l via nc o2 qhs who presents with worsening sob, VILLARREAL and orthopnea. Noted to be hypoxic to low 80s in the ed with increased work of breathing, elevated probnp and noted b/l congestion on imaging. Pt admitted with acute resp failure with hypoxia 2/2 pulm edema 2/2 acute on chronic systolic HFref exacerbation.     Admit to telemetry     Acute on chronic systolic HFref  - pt with fluid overload, was hypoxic and had increased WOB requiring bipap support. Able to be weaned off of bipap to 3l via nc   - elevated probnp   - trend serial cardiac enzymes, trop x 1 negative, trop x2 elevated  to 0.11 likely secondary to fluid overload and hypoxia  - will c/w trending troponins   - ekg with non specific TWI   - cxr with b/l congestion and ct chest with   - f/u TTE   - prior tte per [facility] Dr. Swift with EF: 42% also known hx of MR - moderate so will need to further evaluate for valvular dz   - will see if ischemia work up recommended   - strict I/O monitoring, fluid restriction, daily standing weight   - s/p lasix 40mg IV x 1 dose  -c/w lasix 40mg IV BID x 24-48hrs then reassess  - monitor renal fxn and electrolytes closely on diuresis   - repeat bmp tonight  -c/w entresto with parameters  - cardio started low dose metoprolol with parameters  - [facility] consult noted and appreciated, d/w Dr. swift the above plan of care. Also if pt has EF <35% also recommending AICD evaluation     Coronary artery disease  - troponin up trending will trend serial cardiac enzymes   -c/w asa, brillinta and atorvastatin   - cardio started low dose metoprolol po bid   - will see if ischemia evaluation recommended  - [facility] consult noted and appreciated       Chronic obstructive pulmonary disease  - stable - not in any exacerbation  - per the pt she reports she followed with a pulm outpt who reported she does not have a dx of COPD  - ct chest negative for emphysema - pt has hx of being former smoker and quit 10 yrs ago     Hx lung cancer  - with lesion in the stomach and R hip   -s/p chemo/ immunotherapy and radiation  - per pt she is in remission  - ct chest with stable noted middle lobe 1.5 cm nodule, unchanged dating back to 2015, compatible with a benign entity such as a granuloma or hamartoma.  -c/w duoneb prn   - pt to f/u outpt with pulm     Chronic lower back pain and R hip pain   -hx herniated discs and R hip pain - arthritis   -c/w tylenol prn for mild pain  -c/w tramadol prn for severe pain     Hypothyroidism  -f/u tsh    -c/w synthroid po qd    DVT ppx  -c/w covered on DAPT     Activity level: increase as tolerated with cane  Independent in ADLs at baseline    Dispo: pt currently remains acute requiring inpatient management for hypoxia/ fluid overload.  76 y.o. Female with hx of HTN, HLD, moderate to severe MR CAD/MI ICM lasted PCI 2019 to OSMAR to LCx  of RCA left to right collaterals mild LAD/LM, Lung cancer baseline O2 therapy with METS s/p chemotherapy/ immunotherapy and radiation now in remission, COPD former smoker and chronic systolic HFrEF EF ~ 35 to 40% on chronic 3l via nc o2 qhs who presents with worsening sob, VILLARREAL and orthopnea. Noted to be hypoxic to low 80s in the ed with increased work of breathing, elevated probnp and noted b/l congestion on imaging. Pt admitted with acute resp failure with hypoxia 2/2 pulm edema 2/2 acute on chronic systolic HFref exacerbation.     Admit to telemetry     Acute on chronic systolic HFref  - pt with fluid overload, was hypoxic and had increased WOB requiring bipap support. Able to be weaned off of bipap to 3l via nc   - elevated probnp   - trend serial cardiac enzymes, trop x 1 negative, trop x2 elevated  to 0.11 likely secondary to fluid overload and hypoxia  - will c/w trending troponins   - ekg with non specific TWI   - cxr with b/l congestion and ct chest with   - f/u TTE   - prior tte per [facility] Dr. Swift with EF: 42% also known hx of MR - moderate so will need to further evaluate for valvular dz   - will see if ischemia work up recommended   - strict I/O monitoring, fluid restriction, daily standing weight   - s/p lasix 40mg IV x 1 dose  -c/w lasix 40mg IV BID x 24-48hrs then reassess  - monitor renal fxn and electrolytes closely on diuresis   - repeat bmp tonight  -c/w entresto with parameters  - cardio started low dose metoprolol with parameters  - [facility] consult noted and appreciated, d/w Dr. swift the above plan of care. Also if pt has EF <35% also recommending AICD evaluation     Leukocytosis   -wbc 14   - will trend likely reactive   - no evidence of active infection  - pt was having dysuria 2 weeks ago - UA negatove   - ct chest negative  -f/u cbc in the am     Coronary artery disease  - troponin up trending will trend serial cardiac enzymes   -c/w asa, brillinta and atorvastatin   - cardio started low dose metoprolol po bid   - will see if ischemia evaluation recommended  - [facility] consult noted and appreciated       Chronic obstructive pulmonary disease  - stable - not in any exacerbation  - per the pt she reports she followed with a pulm outpt who reported she does not have a dx of COPD  - ct chest negative for emphysema - pt has hx of being former smoker and quit 10 yrs ago     Hx lung cancer  - with lesion in the stomach and R hip   -s/p chemo/ immunotherapy and radiation  - per pt she is in remission  - ct chest with stable noted middle lobe 1.5 cm nodule, unchanged dating back to 2015, compatible with a benign entity such as a granuloma or hamartoma.  -c/w duoneb prn   - pt to f/u outpt with pulm     Chronic lower back pain and R hip pain   -hx herniated discs and R hip pain - arthritis   -c/w tylenol prn for mild pain  -c/w tramadol prn for severe pain     Hypothyroidism  -f/u tsh    -c/w synthroid po qd    DVT ppx  -c/w covered on DAPT     Activity level: increase as tolerated with cane  Independent in ADLs at baseline    Dispo: pt currently remains acute requiring inpatient management for hypoxia/ fluid overload.  76 y.o. Female with hx of HTN, HLD, moderate to severe MR CAD/MI ICM lasted PCI 2019 to [facility] to LCx  of RCA left to right collaterals mild LAD/LM, Lung cancer baseline O2 therapy with METS s/p chemotherapy/ immunotherapy and radiation now in remission, COPD former smoker and chronic systolic HFrEF EF ~ 35 to 40% on chronic 3l via nc o2 qhs who presents with worsening sob, VILLARREAL and orthopnea. Noted to be hypoxic to low 80s in the ed with increased work of breathing, elevated probnp and noted b/l congestion on imaging. Pt admitted with acute resp failure with hypoxia 2/2 pulm edema 2/2 acute on chronic systolic HFref exacerbation.     Admit to telemetry     Acute on chronic systolic HFref  - pt with fluid overload, was hypoxic and had increased WOB requiring bipap support. Able to be weaned off of bipap to 3l via nc   - elevated probnp   - trend serial cardiac enzymes, trop x 1 negative, trop x2 elevated  to 0.11 likely secondary to fluid overload and hypoxia  - will c/w trending troponins   - ekg with non specific TWI   - cxr with b/l congestion and ct chest with   - f/u TTE   - prior tte per [facility] Dr. Swift with EF: 42% also known hx of MR - moderate so will need to further evaluate for valvular dz   - will see if ischemia work up recommended   - strict I/O monitoring, fluid restriction, daily standing weight   - s/p lasix 40mg IV x 1 dose  -c/w lasix 40mg IV BID x 24-48hrs then reassess  - monitor renal fxn and electrolytes closely on diuresis   - repeat bmp tonight  -c/w entresto with parameters  - cardio started low dose metoprolol with parameters  - [facility] consult noted and appreciated, d/w Dr. swift the above plan of care. Also if pt has EF <35% also recommending AICD evaluation     Leukocytosis   -wbc 14   - will trend likely reactive   - no evidence of active infection  - pt was having dysuria 2 weeks ago - UA negatove   - ct chest negative  -f/u cbc in the am     Coronary artery disease  - troponin up trending will trend serial cardiac enzymes   -c/w asa, brillinta and atorvastatin   - cardio started low dose metoprolol po bid   - will see if ischemia evaluation recommended  - [facility] consult noted and appreciated       Chronic obstructive pulmonary disease  - stable - not in any exacerbation  - per the pt she reports she followed with a pulm outpt who reported she does not have a dx of COPD  - ct chest negative for emphysema - pt has hx of being former smoker and quit 10 yrs ago     Hx lung cancer  - with lesion in the stomach and R hip   -s/p chemo/ immunotherapy and radiation  - per pt she is in remission  - ct chest with stable noted middle lobe 1.5 cm nodule, unchanged dating back to 2015, compatible with a benign entity such as a granuloma or hamartoma.  -c/w duoneb prn   - pt to f/u outpt with pulm     Chronic lower back pain and R hip pain   -hx herniated discs and R hip pain - arthritis   -c/w tylenol prn for mild pain  -c/w tramadol prn for severe pain     Hypothyroidism  -f/u tsh    -c/w synthroid po qd    DVT ppx  -c/w covered on DAPT     Activity level: increase as tolerated with cane  Independent in ADLs at baseline    Dispo: pt currently remains acute requiring inpatient management for hypoxia/ fluid overload.         Addendum  preliminary TTE reading per Dr. Swift severe LV dysfunction and mod- severe MR   npo at midnight for cardiac cath in the am  Also noted with episodes of bradycardia so metoprolol discontinued   Noted episodes of torsades? few seconds stat bmp/ mg phos ordered  d/w Dr. Swift the above and Dr. Long to come and evaluated the pt  76 y.o. Female with hx of HTN, HLD, moderate to severe MR CAD/MI ICM lasted PCI 2019 to OSMAR to LCx  of RCA left to right collaterals mild LAD/LM, Lung cancer baseline O2 therapy with METS s/p chemotherapy/ immunotherapy and radiation now in remission, COPD former smoker and chronic systolic HFrEF EF ~ 35 to 40% on chronic 3l via nc o2 qhs who presents with worsening sob, VILLARREAL and orthopnea. Noted to be hypoxic to low 80s in the ed with increased work of breathing, elevated probnp and noted b/l congestion on imaging. Pt admitted with acute resp failure with hypoxia 2/2 pulm edema 2/2 acute on chronic systolic HFref exacerbation.     Admit to telemetry     Acute on chronic systolic HFref  - pt with fluid overload, was hypoxic and had increased WOB requiring bipap support. Able to be weaned off of bipap to 3l via nc   - elevated probnp   - trend serial cardiac enzymes, trop x 1 negative, trop x2 elevated  to 0.11 likely secondary to fluid overload and hypoxia  - will c/w trending troponins   - ekg with non specific TWI   - cxr with b/l congestion and ct chest with   - f/u TTE   - prior tte per [facility] Dr. Swift with EF: 42% also known hx of MR - moderate so will need to further evaluate for valvular dz   - will see if ischemia work up recommended   - strict I/O monitoring, fluid restriction, daily standing weight   - s/p lasix 40mg IV x 1 dose  -c/w lasix 40mg IV BID x 24-48hrs then reassess  - monitor renal fxn and electrolytes closely on diuresis   - repeat bmp tonight  -c/w entresto with parameters  - cardio started low dose metoprolol with parameters  - [facility] consult noted and appreciated, d/w Dr. swift the above plan of care. Also if pt has EF <35% also recommending AICD evaluation     Leukocytosis   -wbc 14   - will trend likely reactive   - no evidence of active infection  - pt was having dysuria 2 weeks ago - UA negatove   - ct chest negative  -f/u cbc in the am     Coronary artery disease  - troponin up trending will trend serial cardiac enzymes   -c/w asa, brillinta and atorvastatin   - cardio started low dose metoprolol po bid   - will see if ischemia evaluation recommended  - [facility] consult noted and appreciated       Chronic obstructive pulmonary disease  - stable - not in any exacerbation  - per the pt she reports she followed with a pulm outpt who reported she does not have a dx of COPD  - ct chest negative for emphysema - pt has hx of being former smoker and quit 10 yrs ago     Hx lung cancer  - with lesion in the stomach and R hip   -s/p chemo/ immunotherapy and radiation  - per pt she is in remission  - ct chest with stable noted middle lobe 1.5 cm nodule, unchanged dating back to 2015, compatible with a benign entity such as a granuloma or hamartoma.  -c/w duoneb prn   - pt to f/u outpt with pulm     Chronic lower back pain and R hip pain   -hx herniated discs and R hip pain - arthritis   -c/w tylenol prn for mild pain  -c/w tramadol prn for severe pain     Hypothyroidism  -f/u tsh    -c/w synthroid po qd    DVT ppx  -c/w covered on DAPT     Activity level: increase as tolerated with cane  Independent in ADLs at baseline    Dispo: pt currently remains acute requiring inpatient management for hypoxia/ fluid overload.         Addendum  Troponin #3 down trending 0.08   preliminary TTE reading per Dr. Swfit severe LV dysfunction and mod- severe MR   npo at midnight for cardiac cath in the am  Also noted with episodes of bradycardia so metoprolol discontinued   Noted episodes of torsades? few seconds stat bmp/ mg phos ordered which resulted with potassium/ mg wnl   d/w Dr. Swift the above and Dr. Long to come and evaluated the pt       Addendum  cr up trending to 1.3, pts sbp  range  pt is sensitive to the IV diuresis will cut dose starting tomorrow to lasix 40mg IV QD   76 y.o. Female with hx of HTN, HLD, moderate to severe MR CAD/MI ICM lasted PCI 2019 to OSMAR to LCx  of RCA left to right collaterals mild LAD/LM, Lung cancer baseline O2 therapy with METS s/p chemotherapy/ immunotherapy and radiation now in remission, COPD former smoker and chronic systolic HFrEF EF ~ 35 to 40% on chronic 3l via nc o2 qhs who presents with worsening sob, VILLARREAL and orthopnea. Noted to be hypoxic to low 80s in the ed with increased work of breathing, elevated probnp and noted b/l congestion on imaging. Pt admitted with acute resp failure with hypoxia 2/2 pulm edema 2/2 acute on chronic systolic HFref exacerbation.     Admit to telemetry     Acute on chronic systolic HFref  - pt with fluid overload, was hypoxic and had increased WOB requiring bipap support. Able to be weaned off of bipap to 3l via nc   - elevated probnp   - trend serial cardiac enzymes, trop x 1 negative, trop x2 elevated  to 0.11 likely secondary to fluid overload and hypoxia  - will c/w trending troponins   - ekg with non specific TWI   - cxr with b/l congestion and ct chest with   - f/u TTE   - prior tte per [facility] Dr. Swift with EF: 42% also known hx of MR - moderate so will need to further evaluate for valvular dz   - will see if ischemia work up recommended   - strict I/O monitoring, fluid restriction, daily standing weight   - s/p lasix 40mg IV x 1 dose  -c/w lasix 40mg IV BID x 24-48hrs then reassess  - monitor renal fxn and electrolytes closely on diuresis   - repeat bmp tonight  -c/w entresto with parameters  - cardio started low dose metoprolol with parameters  - [facility] consult noted and appreciated, d/w Dr. swift the above plan of care. Also if pt has EF <35% also recommending AICD evaluation     Leukocytosis   -wbc 14   - will trend likely reactive   - no evidence of active infection  - pt was having dysuria 2 weeks ago - UA negatove   - ct chest negative  -f/u cbc in the am     Coronary artery disease  - troponin up trending will trend serial cardiac enzymes   -c/w asa, brillinta and atorvastatin   - cardio started low dose metoprolol po bid   - will see if ischemia evaluation recommended  - [facility] consult noted and appreciated     Borderline hypercalcemia  - calcium ~ 10   -f/u vit d and pth levels   - will see if additional work up needed based on results and trending calcium       Chronic obstructive pulmonary disease  - stable - not in any exacerbation  - per the pt she reports she followed with a pulm outpt who reported she does not have a dx of COPD  - ct chest negative for emphysema - pt has hx of being former smoker and quit 10 yrs ago     Hx lung cancer  - with lesion in the stomach and R hip   -s/p chemo/ immunotherapy and radiation  - per pt she is in remission  - ct chest with stable noted middle lobe 1.5 cm nodule, unchanged dating back to 2015, compatible with a benign entity such as a granuloma or hamartoma.  -c/w duoneb prn   - pt to f/u outpt with pulm   - pt had her last scan one year prior and was told by [facility] that she remains cancer free     Chronic lower back pain and R hip pain   -hx herniated discs and R hip pain - arthritis   -c/w tylenol prn for mild pain  -c/w tramadol prn for severe pain     Hypothyroidism  -f/u tsh    -c/w synthroid po qd    DVT ppx  -c/w covered on DAPT     Activity level: increase as tolerated with cane  Independent in ADLs at baseline    Dispo: pt currently remains acute requiring inpatient management for hypoxia/ fluid overload.         Addendum  Troponin #3 down trending 0.08   preliminary TTE reading per Dr. Swift severe LV dysfunction and mod- severe MR   npo at midnight for cardiac cath in the am  Also noted with episodes of bradycardia so metoprolol discontinued   Noted episodes of torsades? few seconds stat bmp/ mg phos ordered which resulted with potassium/ mg wnl   d/w Dr. Swift the above and Dr. Long to come and evaluated the pt       Addendum  cr up trending to 1.3, pts sbp  range  pt is sensitive to the IV diuresis will cut dose starting tomorrow to lasix 40mg IV QD. Pt has known fluctuation in kidney numbers from entresto as well. Dose of entresto outpt was decreased to current dose the pt is on as well. She also had prior issues with hyperkalemia and required lokelma due to entresto which resolved after the dose was reduced.  76 y.o. Female with hx of HTN, HLD, moderate to severe MR CAD/MI ICM lasted PCI 2019 to OSMAR to LCx  of RCA left to right collaterals mild LAD/LM, Lung cancer baseline O2 therapy with METS s/p chemotherapy/ immunotherapy and radiation now in remission, COPD former smoker and chronic systolic HFrEF EF ~ 35 to 40% on chronic 3l via nc o2 qhs who presents with worsening sob, VILLARREAL and orthopnea. Noted to be hypoxic to low 80s in the ed with increased work of breathing, elevated probnp and noted b/l congestion on imaging. Pt admitted with acute resp failure with hypoxia 2/2 pulm edema 2/2 acute on chronic systolic HFref exacerbation.     Admit to telemetry     Acute on chronic systolic HFref  - pt with fluid overload, was hypoxic and had increased WOB requiring bipap support. Able to be weaned off of bipap to 3l via nc   - elevated probnp   - trend serial cardiac enzymes, trop x 1 negative, trop x2 elevated  to 0.11 likely secondary to fluid overload and hypoxia  - will c/w trending troponins   - ekg with non specific TWI   - cxr with b/l congestion and ct chest with   - f/u TTE   - prior tte per [facility] Dr. Swift with EF: 42% also known hx of MR - moderate so will need to further evaluate for valvular dz   - will see if ischemia work up recommended   - strict I/O monitoring, fluid restriction, daily standing weight   - s/p lasix 40mg IV x 1 dose  -c/w lasix 40mg IV BID x 24-48hrs then reassess  - monitor renal fxn and electrolytes closely on diuresis   - repeat bmp tonight  -c/w entresto with parameters  - cardio started low dose metoprolol with parameters  - [facility] consult noted and appreciated, d/w Dr. swift the above plan of care. Also if pt has EF <35% also recommending AICD evaluation     Leukocytosis   -wbc 14   - will trend likely reactive   - no evidence of active infection  - pt was having dysuria 2 weeks ago - UA negatove   - ct chest negative  -f/u cbc in the am     Coronary artery disease  - troponin up trending will trend serial cardiac enzymes   -c/w asa, brillinta and atorvastatin   - cardio started low dose metoprolol po bid   - will see if ischemia evaluation recommended  - [facility] consult noted and appreciated     Borderline hypercalcemia  - calcium ~ 10   -f/u vit d and pth levels   - will see if additional work up needed based on results and trending calcium       Chronic obstructive pulmonary disease  - stable - not in any exacerbation  - per the pt she reports she followed with a pulm outpt who reported she does not have a dx of COPD  - ct chest negative for emphysema - pt has hx of being former smoker and quit 10 yrs ago     Hx lung cancer  - with lesion in the stomach and R hip   -s/p chemo/ immunotherapy and radiation  - per pt she is in remission  - ct chest with stable noted middle lobe 1.5 cm nodule, unchanged dating back to 2015, compatible with a benign entity such as a granuloma or hamartoma.  -c/w duoneb prn   - pt to f/u outpt with pulm   - pt had her last scan one year prior and was told by [facility] that she remains cancer free     Chronic lower back pain and R hip pain   -hx herniated discs and R hip pain - arthritis   -c/w tylenol prn for mild pain  -c/w tramadol prn for severe pain     Normocytic anemia  - hgb 9.8   - baseline is 10-11  -f/u anemia panel  -c/w MVI po qd     Hypothyroidism  -f/u tsh    -c/w synthroid po qd    DVT ppx  -c/w covered on DAPT     Activity level: increase as tolerated with cane  Independent in ADLs at baseline    Dispo: pt currently remains acute requiring inpatient management for hypoxia/ fluid overload.         Addendum  Troponin #3 down trending 0.08   preliminary TTE reading per Dr. Swift severe LV dysfunction and mod- severe MR   npo at midnight for cardiac cath in the am  Also noted with episodes of bradycardia so metoprolol discontinued   Noted episodes of torsades? few seconds stat bmp/ mg phos ordered which resulted with potassium/ mg wnl   d/w Dr. Swift the above and Dr. Long to come and evaluated the pt       Addendum  cr up trending to 1.3, pts sbp  range  pt is sensitive to the IV diuresis will cut dose starting tomorrow to lasix 40mg IV QD. Pt has known fluctuation in kidney numbers from entresto as well. Dose of entresto outpt was decreased to current dose the pt is on as well. She also had prior issues with hyperkalemia and required lokelma due to entresto which resolved after the dose was reduced.

## 2022-01-03 NOTE — ED PROVIDER NOTE - CLINICAL SUMMARY MEDICAL DECISION MAKING FREE TEXT BOX
Acute on chronic hypoxic respiratory failure requiring NIPPV, clinical presentation suspicious for acute pulmonary edema, CHF exacerbation. Doing well on bilevel, will give IV lasix, plan for step down admission. No infectious signs or symptoms, will hold abx at this time.

## 2022-01-03 NOTE — PATIENT PROFILE ADULT - FALL HARM RISK - UNIVERSAL INTERVENTIONS
Bed in lowest position, wheels locked, appropriate side rails in place/Call bell, personal items and telephone in reach/Instruct patient to call for assistance before getting out of bed or chair/Non-slip footwear when patient is out of bed/[place] to call system/Physically safe environment - no spills, clutter or unnecessary equipment/Purposeful Proactive Rounding/Room/bathroom lighting operational, light cord in reach

## 2022-01-03 NOTE — H&P ADULT - NSICDXPASTMEDICALHX_GEN_ALL_CORE_FT
PAST MEDICAL HISTORY:  Anxiety     Carcinoma metastic stage 4 with stephanie to the iliac bone, colon, and lung    Chronic systolic heart failure     COPD (chronic obstructive pulmonary disease)     Hyperlipidemia     Hypertension     Hypothyroid     Iliac crest bone pain cancer, right

## 2022-01-03 NOTE — H&P ADULT - HISTORY OF PRESENT ILLNESS
76 y Female HTN HLD HFrEF EF ~ 35 to 40% moderate to severe MR CAD/MI ICM lasted PCI 2019 to OSMAR to LCx  of RCA left to right collaterals mild LAD/LM Lung cancer baseline O2 therapy with METS COPD ex smoker who presents with dyspnea VILLARREAL orthopnea without PND, weight gain or chest pain.  Currently off BIPAP after IV Lasix.  MUGA EF 42%.       76 y Female HTN, HLD, chronic systolic HFrEF EF ~ 35 to 40% moderate to severe MR CAD/MI ICM lasted PCI 2019 to OSMAR to LCx  of RCA left to right collaterals mild LAD/LM Lung cancer baseline O2 therapy with METS COPD ex smoker who presents with dyspnea VILLARREAL orthopnea without PND, weight gain or chest pain.  Currently off BIPAP after IV Lasix.  MUGA EF 42%.       76 y.o. Female with hx of HTN, HLD, moderate to severe MR CAD/MI ICM lasted PCI 2019 to OSMAR to LCx  of RCA left to right collaterals mild LAD/LM, Lung cancer baseline O2 therapy with METS s/p chemotherapy/ immunotherapy and radiation now in remission, COPD former smoker and chronic systolic HFrEF EF ~ 35 to 40% on chronic 3l via nc o2 qhs who presents with worsening sob, VILLARREAL and orthopnea. Pt reported that at 3am this morning she developed acute onset of sob. She states that she got up to go to the bathroom and urinated, after urinating she got off the toilet and then felt as if she had to continue her stream but then all of sudden felt increased work of breathing to the point where she saw herself having abdominal breathing. She found it so difficult to breathe that she called 911. On EMS arrival the pt was noted to have hypoxia o2 sat 80-81% and was placed on supplemental o2 100%NRB. In the ED due to increased work of breathing the pt was placed on continuous BIPAP. Pt given lasix 40mg IV x 1 dose. Pt reports non compliance with diet as she had increased fluid and salt intake over the holidays. She also reports she was recommended to take lasix prn, as her kidney numbers fluctuate so she has not been taking it. Patient denies chest pain, peripheral edema, abd pain, N/V, fever, chills, dysuria or any other complaints. All remainder ROS negative.

## 2022-01-03 NOTE — H&P ADULT - NSICDXFAMILYHX_GEN_ALL_CORE_FT
FAMILY HISTORY:  Child  Still living? Unknown  FH: type 2 diabetes mellitus, Age at diagnosis: Age Unknown

## 2022-01-03 NOTE — ED ADULT NURSE NOTE - OBJECTIVE STATEMENT
pt presents to ED A&Ox4 due to respiratory distress. pt awoken from sleep due to SOB. hx of COPD, HTN, anxiety and lung ca. placed on bipap prior to assumption of care. pt tolerating well at this time. CM in place, NSR. o2 sat 100% at this time.

## 2022-01-03 NOTE — ED ADULT TRIAGE NOTE - CHIEF COMPLAINT QUOTE
patient from home with complaints of respiratory distress. patient woke up from her sleep with difficulty breathing, patient with labored breathing and unable ot catcher her breath. history of lung ca brought to critical care for eval

## 2022-01-03 NOTE — CONSULT NOTE ADULT - SUBJECTIVE AND OBJECTIVE BOX
[facility]                                   [address] [place] [address]                                                      PHONE: [phone]                                                         FAX: [phone]  [email or web address]  ---------------------------------------------------------------------------------------------------------------------------------    Reason for Consult: CHF     HPI:  JOEY CHRISTOPHER is an 76y Female HTN HLD HFrEF EF ~ 35 to 40% moderate to severe MR CAD/MI ICM lasted PCI  to OSMAR to LCx  of RCA left to right collaterals mild LAD/LM Lung cancer baseline O2 therapy with METS COPD ex smoker who presents with dyspnea VILLARREAL orthopnea without PND, weight gain or chest pain.  Currently off BIPAP after IV Lasix.  MUGA EF 42%.        PAST MEDICAL & SURGICAL HISTORY:  Hypertension    Hypothyroid    Anxiety    Iliac crest bone pain  cancer, right    Carcinoma  metastic stage 4 with stephanie to the iliac bone, colon, and lung    COPD (chronic obstructive pulmonary disease)    S/P cardiac catheterization  stent placed 2019        No Known Allergies      MEDICATIONS  (STANDING):  albuterol/ipratropium for Nebulization 3 milliLiter(s) Nebulizer every 6 hours  aspirin enteric coated 81 milliGRAM(s) Oral daily  atorvastatin 40 milliGRAM(s) Oral at bedtime  cholecalciferol 2000 Unit(s) Oral daily  cyanocobalamin 1000 MICROGram(s) Oral daily  furosemide   Injectable 40 milliGRAM(s) IV Push two times a day  levothyroxine 200 MICROGram(s) Oral daily  metoprolol succinate ER 12.5 milliGRAM(s) Oral daily  multivitamin 1 Tablet(s) Oral daily  sacubitril 24 mG/valsartan 26 mG 1 Tablet(s) Oral two times a day  ticagrelor 90 milliGRAM(s) Oral every 12 hours    MEDICATIONS  (PRN):  acetaminophen     Tablet .. 650 milliGRAM(s) Oral every 6 hours PRN Temp greater or equal to 38C (100.4F), Mild Pain (1 - 3)  traMADol 50 milliGRAM(s) Oral every 8 hours PRN Severe Pain (7 - 10)      Social History:  Cigarettes:       ex smoker              Alchohol:       none          Illicit Drug Abuse:  none     ROS: Negative other than as mentioned in HPI.    Vital Signs Last 24 Hrs  T(C): 36.6 (2022 04:24), Max: 36.6 (2022 04:24)  T(F): 97.8 (2022 04:24), Max: 97.8 (2022 04:24)  HR: 62 (2022 07:53) (62 - 115)  BP: 101/54 (2022 07:59) (98/56 - 178/77)  BP(mean): --  RR: 21 (2022 07:53) (21 - 30)  SpO2: 100% (2022 07:53) (96% - 100%)  ICU Vital Signs Last 24 Hrs  JOEY CHRISTOPHER  I&O's Detail    I&O's Summary    Drug Dosing Weight  JOEY CHRISTOPHER      PHYSICAL EXAM:  General: Appears well developed, alert and cooperative.  HEENT: Head; normocephalic, atraumatic.  Eyes: Pupils reactive, cornea wnl.  Neck: Supple, no nodes adenopathy, no NVD or carotid bruit or thyromegaly.  CARDIOVASCULAR: Normal S1 and S2, 2/6 murmur, rub, gallop or lift.   LUNGS: Decrease BS B/L   ABDOMEN: Soft, nontender without mass or organomegaly. bowel sounds normoactive.  EXTREMITIES: No clubbing, cyanosis or edema. Distal pulses wnl.   SKIN: warm and dry with normal turgor.  NEURO: Alert/oriented x 3/normal motor exam. No pathologic reflexes.    PSYCH: normal affect.        LABS:                        11.5   14.01 )-----------( 452      ( 2022 04:41 )             39.1     01-03    137  |  105  |  16.4  ----------------------------<  255<H>  3.5   |  19.0<L>  |  1.16    Ca    10.0      2022 04:41  Mg     2.2     -    TPro  6.9  /  Alb  3.9  /  TBili  0.8  /  DBili  x   /  AST  16  /  ALT  11  /  AlkPhos  105  -    JOEY CHRISTOPHER  CARDIAC MARKERS ( 2022 04:41 )  x     / <0.01 ng/mL / x     / x     / x            Urinalysis Basic - ( 2022 10:05 )    Color: Yellow / Appearance: Clear / S.015 / pH: x  Gluc: x / Ketone: Negative  / Bili: Negative / Urobili: Negative mg/dL   Blood: x / Protein: Negative / Nitrite: Negative   Leuk Esterase: Negative / RBC: x / WBC x   Sq Epi: x / Non Sq Epi: x / Bacteria: x        RADIOLOGY & ADDITIONAL STUDIES:    INTERPRETATION OF TELEMETRY (personally reviewed):    ECG: NSR TWI none specific changes       < from: TTE Echo Complete w/Doppler (19 @ 08:44) >    Summary:   1. Severely decreased segmental left ventricular systolic function. Left   ventricular ejection fraction, by visual estimation, is 30 to 35%.   Lateral and inferior segments hypokinetic.   2. Low normal RV systolic function.   3. Severely enlarged left atrium.   4. Mildly increased left ventricular internal cavity size.   5. There is mild concentric left ventricular hypertrophy.   6. Moderate to severe mitral valve regurgitation, ischemic mitral   regurgitation.   7. Mild tricuspid regurgitation.   8. Mildly dilated pulmonary artery.   9. Trivial pericardial effusion.    Lorena Aleman DO Electronically signed on 2019 at 1:58:55 PM    < end of copied text >        < from: Cardiac Cath Lab - Adult (19 @ 18:08) >  VENTRICLES: LVEF 40%  CORONARY VESSELS: The coronary circulation is right dominant.  LM:   --  LM: Normal.  LAD:   --  LAD: Angiography showed minor luminal irregularities with no  flow limiting lesions.  CX:   --  Proximal circumflex: There was a 100 % stenosis.  RCA:   --  Mid RCA: There was a 100 % stenosis. There was poor collateral  blood supply to the distal myocardium.  COMPLICATIONS: There were no complications.  DIAGNOSTIC IMPRESSIONS: Acute occlusion of proximal LCX and  of RCA with  left to right collaterals. The LCX was treated with  thrombectomy/PTCA/STENT (OSMAR-Resolute) with good result.  DIAGNOSTIC RECOMMENDATIONS: ASA and Brilinta  INTERVENTIONAL IMPRESSIONS: Acute occlusion of proximal LCX and  of RCA  with left to right collaterals. The LCX was treated with  thrombectomy/PTCA/STENT (OSMAR-Resolute) with good result.  INTERVENTIONAL RECOMMENDATIONS: ASA and Luis Daniel  Prepared and signed by  Hipolito Avila MD    < end of copied text >      < from: CT Chest No Cont (22 @ 09:25) >  IMPRESSION:    Mild pulmonary edema, small pleural effusions, and associated passive   atelectasis of the lower lobes.    Stable middle lobe 1.5 cm nodule, unchanged dating back to ,   compatible with a benign entity such as a granuloma or hamartoma.    < end of copied text >        Assessment and Plan:  In summary, JOYE CHRISTOPHER is an 76y Female with past medical history significant for HTN HLD HFrEF EF ~ 35 to 40% moderate to severe MR CAD/MI ICM lasted PCI 2019 to OSMAR to LCx  of RCA left to right collaterals mild LAD/LM Lung cancer baseline O2 therapy with METS COPD ex smoker who presents with dyspnea VILLARREAL orthopnea without PND, weight gain or chest pain.  Currently off BIPAP after IV Lasix.  MUGA EF 42%.  CT of chest WO showed pulmonary edema small pleural effusion with passive atelectasis of the lower lobes; Trop negative x one and BNP 4169; Acute on chronic HFrEF     Plan  1.  Monitor on TELE   2.  Serial trop to rule out AMI   3.  Agree with IV Lasix 40mg BID and monitor   4.  Continue current optimal medical therapy   5.  Check TTE to re-evaluate LV EF   6.  IF TTE shows EF < 35% then AICD pre discharge

## 2022-01-03 NOTE — ED PROVIDER NOTE - OBJECTIVE STATEMENT
76yof remote history of lung ca, baseline oxygen dependent had acute shortness of breath this morning when she woke up to use the bathroom. SpO2 in 80s per EMS, escalated to NRB. No recent fevers, chills, cough, went to bed in usual state of health.

## 2022-01-04 LAB
24R-OH-CALCIDIOL SERPL-MCNC: 39.6 NG/ML — SIGNIFICANT CHANGE UP (ref 30–80)
ANION GAP SERPL CALC-SCNC: 14 MMOL/L — SIGNIFICANT CHANGE UP (ref 5–17)
BASOPHILS # BLD AUTO: 0.07 K/UL — SIGNIFICANT CHANGE UP (ref 0–0.2)
BASOPHILS NFR BLD AUTO: 1 % — SIGNIFICANT CHANGE UP (ref 0–2)
BUN SERPL-MCNC: 24.7 MG/DL — HIGH (ref 8–20)
CALCIUM SERPL-MCNC: 10 MG/DL — SIGNIFICANT CHANGE UP (ref 8.6–10.2)
CALCIUM SERPL-MCNC: 10.2 MG/DL — SIGNIFICANT CHANGE UP (ref 8.4–10.5)
CHLORIDE SERPL-SCNC: 103 MMOL/L — SIGNIFICANT CHANGE UP (ref 98–107)
CO2 SERPL-SCNC: 21 MMOL/L — LOW (ref 22–29)
CREAT SERPL-MCNC: 1.46 MG/DL — HIGH (ref 0.5–1.3)
EOSINOPHIL # BLD AUTO: 0.39 K/UL — SIGNIFICANT CHANGE UP (ref 0–0.5)
EOSINOPHIL NFR BLD AUTO: 5.5 % — SIGNIFICANT CHANGE UP (ref 0–6)
FERRITIN SERPL-MCNC: 155 NG/ML — HIGH (ref 15–150)
GLUCOSE SERPL-MCNC: 85 MG/DL — SIGNIFICANT CHANGE UP (ref 70–99)
HCT VFR BLD CALC: 33.5 % — LOW (ref 34.5–45)
HGB BLD-MCNC: 10 G/DL — LOW (ref 11.5–15.5)
IMM GRANULOCYTES NFR BLD AUTO: 0.4 % — SIGNIFICANT CHANGE UP (ref 0–1.5)
IRON SATN MFR SERPL: 11 % — LOW (ref 14–50)
IRON SATN MFR SERPL: 27 UG/DL — LOW (ref 37–145)
LYMPHOCYTES # BLD AUTO: 1.09 K/UL — SIGNIFICANT CHANGE UP (ref 1–3.3)
LYMPHOCYTES # BLD AUTO: 15.4 % — SIGNIFICANT CHANGE UP (ref 13–44)
MAGNESIUM SERPL-MCNC: 2.1 MG/DL — SIGNIFICANT CHANGE UP (ref 1.6–2.6)
MCHC RBC-ENTMCNC: 25.8 PG — LOW (ref 27–34)
MCHC RBC-ENTMCNC: 29.9 GM/DL — LOW (ref 32–36)
MCV RBC AUTO: 86.3 FL — SIGNIFICANT CHANGE UP (ref 80–100)
MONOCYTES # BLD AUTO: 0.63 K/UL — SIGNIFICANT CHANGE UP (ref 0–0.9)
MONOCYTES NFR BLD AUTO: 8.9 % — SIGNIFICANT CHANGE UP (ref 2–14)
NEUTROPHILS # BLD AUTO: 4.85 K/UL — SIGNIFICANT CHANGE UP (ref 1.8–7.4)
NEUTROPHILS NFR BLD AUTO: 68.8 % — SIGNIFICANT CHANGE UP (ref 43–77)
PLATELET # BLD AUTO: 328 K/UL — SIGNIFICANT CHANGE UP (ref 150–400)
POTASSIUM SERPL-MCNC: 3.8 MMOL/L — SIGNIFICANT CHANGE UP (ref 3.5–5.3)
POTASSIUM SERPL-SCNC: 3.8 MMOL/L — SIGNIFICANT CHANGE UP (ref 3.5–5.3)
PTH-INTACT FLD-MCNC: 90 PG/ML — HIGH (ref 15–65)
RBC # BLD: 3.88 M/UL — SIGNIFICANT CHANGE UP (ref 3.8–5.2)
RBC # FLD: 17 % — HIGH (ref 10.3–14.5)
SODIUM SERPL-SCNC: 138 MMOL/L — SIGNIFICANT CHANGE UP (ref 135–145)
TIBC SERPL-MCNC: 256 UG/DL — SIGNIFICANT CHANGE UP (ref 220–430)
TRANSFERRIN SERPL-MCNC: 179 MG/DL — LOW (ref 192–382)
WBC # BLD: 7.06 K/UL — SIGNIFICANT CHANGE UP (ref 3.8–10.5)
WBC # FLD AUTO: 7.06 K/UL — SIGNIFICANT CHANGE UP (ref 3.8–10.5)

## 2022-01-04 PROCEDURE — 93010 ELECTROCARDIOGRAM REPORT: CPT

## 2022-01-04 PROCEDURE — 99233 SBSQ HOSP IP/OBS HIGH 50: CPT

## 2022-01-04 RX ORDER — FUROSEMIDE 40 MG
40 TABLET ORAL
Refills: 0 | Status: DISCONTINUED | OUTPATIENT
Start: 2022-01-05 | End: 2022-01-05

## 2022-01-04 RX ORDER — ENOXAPARIN SODIUM 100 MG/ML
40 INJECTION SUBCUTANEOUS DAILY
Refills: 0 | Status: DISCONTINUED | OUTPATIENT
Start: 2022-01-04 | End: 2022-01-05

## 2022-01-04 RX ADMIN — Medication 200 MICROGRAM(S): at 07:37

## 2022-01-04 RX ADMIN — TICAGRELOR 90 MILLIGRAM(S): 90 TABLET ORAL at 07:37

## 2022-01-04 RX ADMIN — Medication 3 MILLILITER(S): at 21:17

## 2022-01-04 RX ADMIN — Medication 81 MILLIGRAM(S): at 14:16

## 2022-01-04 RX ADMIN — Medication 1 TABLET(S): at 14:16

## 2022-01-04 RX ADMIN — ATORVASTATIN CALCIUM 40 MILLIGRAM(S): 80 TABLET, FILM COATED ORAL at 22:34

## 2022-01-04 RX ADMIN — Medication 40 MILLIGRAM(S): at 06:50

## 2022-01-04 RX ADMIN — PREGABALIN 1000 MICROGRAM(S): 225 CAPSULE ORAL at 14:16

## 2022-01-04 RX ADMIN — Medication 3 MILLILITER(S): at 15:19

## 2022-01-04 RX ADMIN — ENOXAPARIN SODIUM 40 MILLIGRAM(S): 100 INJECTION SUBCUTANEOUS at 22:34

## 2022-01-04 RX ADMIN — TICAGRELOR 90 MILLIGRAM(S): 90 TABLET ORAL at 18:22

## 2022-01-04 RX ADMIN — SACUBITRIL AND VALSARTAN 1 TABLET(S): 24; 26 TABLET, FILM COATED ORAL at 18:23

## 2022-01-04 RX ADMIN — Medication 3 MILLILITER(S): at 09:16

## 2022-01-04 NOTE — PROGRESS NOTE ADULT - ASSESSMENT
76 y.o. Female with hx of HTN, HLD, moderate to severe MR CAD/MI ICM lasted PCI 2019 to OSMAR to LCx  of RCA left to right collaterals mild LAD/LM, Lung cancer baseline O2 therapy with METS s/p chemotherapy/ immunotherapy and radiation now in remission, COPD former smoker and chronic systolic HFrEF EF ~ 35 to 40% on chronic 3l via nc o2 qhs who presents with worsening sob, VILLARREAL and orthopnea. Noted to be hypoxic to low 80s in the ed with increased work of breathing, elevated probnp and noted b/l congestion on imaging. Pt admitted with acute resp failure with hypoxia 2/2 pulm edema 2/2 acute on chronic systolic HFref exacerbation.       #Acute on chronic systolic CHF  - c/w tele  - Cardiology consult noted >> ongoing discussion about LHC while in house and need for ICD.  - currently appears close to be euvolemic >> furosemide 40 bid in am  - strict I/O monitoring, fluid restriction, daily standing weight   - c/w entresto and metoprolo ER 12.5 mg with parameters    #Coronary artery disease  # Elevated troponin  - troponin trend noted   -c/w asa, brillinta and atorvastatin, bb as above    #Leukocytosis   - will trend likely reactive   - no evidence of active infection  - pt was having dysuria 2 weeks ago - UA negatove   - ct chest negative  -f/u cbc in the am     #Borderline hypercalcemia  - bmp noted, no further work up      #Chronic obstructive pulmonary disease  - stable - not in any exacerbation  - at home on nocturnal oxygen at 2-3 L  - per the pt she reports she followed with a pulm outpt who reported she does not have a dx of COPD  - ct chest negative for emphysema - pt has hx of being former smoker and quit 10 yrs ago     #Hx lung cancer iwth mts dx in stomach and R hip, in remission as per pt with chronic pain  - OP f/u with [facility].  -c/w tylenol prn for mild pain  -c/w tramadol prn for severe pain     # Normocytic anemia - OP f/u     #Hypothyroidism - c/w synthroid po qd    DVT ppx - Lovenox  CODE - full  Dispo - depends if pt agreable with C.

## 2022-01-05 ENCOUNTER — TRANSCRIPTION ENCOUNTER (OUTPATIENT)
Age: 77
End: 2022-01-05

## 2022-01-05 VITALS
RESPIRATION RATE: 18 BRPM | DIASTOLIC BLOOD PRESSURE: 57 MMHG | TEMPERATURE: 98 F | SYSTOLIC BLOOD PRESSURE: 117 MMHG | OXYGEN SATURATION: 100 % | HEART RATE: 58 BPM

## 2022-01-05 LAB
-  AMIKACIN: SIGNIFICANT CHANGE UP
-  AMOXICILLIN/CLAVULANIC ACID: SIGNIFICANT CHANGE UP
-  AMPICILLIN/SULBACTAM: SIGNIFICANT CHANGE UP
-  AMPICILLIN: SIGNIFICANT CHANGE UP
-  AZTREONAM: SIGNIFICANT CHANGE UP
-  CEFAZOLIN: SIGNIFICANT CHANGE UP
-  CEFEPIME: SIGNIFICANT CHANGE UP
-  CEFOXITIN: SIGNIFICANT CHANGE UP
-  CEFTRIAXONE: SIGNIFICANT CHANGE UP
-  CIPROFLOXACIN: SIGNIFICANT CHANGE UP
-  ERTAPENEM: SIGNIFICANT CHANGE UP
-  GENTAMICIN: SIGNIFICANT CHANGE UP
-  IMIPENEM: SIGNIFICANT CHANGE UP
-  LEVOFLOXACIN: SIGNIFICANT CHANGE UP
-  MEROPENEM: SIGNIFICANT CHANGE UP
-  NITROFURANTOIN: SIGNIFICANT CHANGE UP
-  PIPERACILLIN/TAZOBACTAM: SIGNIFICANT CHANGE UP
-  TIGECYCLINE: SIGNIFICANT CHANGE UP
-  TOBRAMYCIN: SIGNIFICANT CHANGE UP
-  TRIMETHOPRIM/SULFAMETHOXAZOLE: SIGNIFICANT CHANGE UP
ANION GAP SERPL CALC-SCNC: 14 MMOL/L — SIGNIFICANT CHANGE UP (ref 5–17)
BUN SERPL-MCNC: 21 MG/DL — HIGH (ref 8–20)
CALCIUM SERPL-MCNC: 10.2 MG/DL — SIGNIFICANT CHANGE UP (ref 8.6–10.2)
CHLORIDE SERPL-SCNC: 103 MMOL/L — SIGNIFICANT CHANGE UP (ref 98–107)
CO2 SERPL-SCNC: 23 MMOL/L — SIGNIFICANT CHANGE UP (ref 22–29)
CREAT SERPL-MCNC: 1.22 MG/DL — SIGNIFICANT CHANGE UP (ref 0.5–1.3)
CULTURE RESULTS: SIGNIFICANT CHANGE UP
GLUCOSE SERPL-MCNC: 101 MG/DL — HIGH (ref 70–99)
HCT VFR BLD CALC: 32.4 % — LOW (ref 34.5–45)
HGB BLD-MCNC: 9.7 G/DL — LOW (ref 11.5–15.5)
MCHC RBC-ENTMCNC: 25.7 PG — LOW (ref 27–34)
MCHC RBC-ENTMCNC: 29.9 GM/DL — LOW (ref 32–36)
MCV RBC AUTO: 85.9 FL — SIGNIFICANT CHANGE UP (ref 80–100)
METHOD TYPE: SIGNIFICANT CHANGE UP
ORGANISM # SPEC MICROSCOPIC CNT: SIGNIFICANT CHANGE UP
ORGANISM # SPEC MICROSCOPIC CNT: SIGNIFICANT CHANGE UP
PLATELET # BLD AUTO: 323 K/UL — SIGNIFICANT CHANGE UP (ref 150–400)
POTASSIUM SERPL-MCNC: 3.5 MMOL/L — SIGNIFICANT CHANGE UP (ref 3.5–5.3)
POTASSIUM SERPL-SCNC: 3.5 MMOL/L — SIGNIFICANT CHANGE UP (ref 3.5–5.3)
RBC # BLD: 3.77 M/UL — LOW (ref 3.8–5.2)
RBC # FLD: 17.2 % — HIGH (ref 10.3–14.5)
SODIUM SERPL-SCNC: 140 MMOL/L — SIGNIFICANT CHANGE UP (ref 135–145)
SPECIMEN SOURCE: SIGNIFICANT CHANGE UP
WBC # BLD: 6.79 K/UL — SIGNIFICANT CHANGE UP (ref 3.8–10.5)
WBC # FLD AUTO: 6.79 K/UL — SIGNIFICANT CHANGE UP (ref 3.8–10.5)

## 2022-01-05 PROCEDURE — 99223 1ST HOSP IP/OBS HIGH 75: CPT

## 2022-01-05 PROCEDURE — 99239 HOSP IP/OBS DSCHRG MGMT >30: CPT

## 2022-01-05 PROCEDURE — 93010 ELECTROCARDIOGRAM REPORT: CPT

## 2022-01-05 PROCEDURE — 99497 ADVNCD CARE PLAN 30 MIN: CPT | Mod: 25

## 2022-01-05 RX ORDER — ASPIRIN/CALCIUM CARB/MAGNESIUM 324 MG
1 TABLET ORAL
Qty: 0 | Refills: 0 | DISCHARGE
Start: 2022-01-05

## 2022-01-05 RX ORDER — METOPROLOL TARTRATE 50 MG
0.5 TABLET ORAL
Qty: 15 | Refills: 0
Start: 2022-01-05 | End: 2022-02-03

## 2022-01-05 RX ORDER — FUROSEMIDE 40 MG
1 TABLET ORAL
Qty: 60 | Refills: 0
Start: 2022-01-05 | End: 2022-02-03

## 2022-01-05 RX ORDER — MIRTAZAPINE 45 MG/1
1 TABLET, ORALLY DISINTEGRATING ORAL
Qty: 15 | Refills: 0
Start: 2022-01-05 | End: 2022-01-19

## 2022-01-05 RX ADMIN — Medication 1 TABLET(S): at 13:23

## 2022-01-05 RX ADMIN — Medication 12.5 MILLIGRAM(S): at 05:48

## 2022-01-05 RX ADMIN — Medication 40 MILLIGRAM(S): at 05:48

## 2022-01-05 RX ADMIN — PREGABALIN 1000 MICROGRAM(S): 225 CAPSULE ORAL at 13:23

## 2022-01-05 RX ADMIN — TICAGRELOR 90 MILLIGRAM(S): 90 TABLET ORAL at 05:49

## 2022-01-05 RX ADMIN — Medication 81 MILLIGRAM(S): at 13:23

## 2022-01-05 RX ADMIN — SACUBITRIL AND VALSARTAN 1 TABLET(S): 24; 26 TABLET, FILM COATED ORAL at 05:48

## 2022-01-05 RX ADMIN — ENOXAPARIN SODIUM 40 MILLIGRAM(S): 100 INJECTION SUBCUTANEOUS at 13:23

## 2022-01-05 RX ADMIN — Medication 3 MILLILITER(S): at 04:19

## 2022-01-05 RX ADMIN — Medication 200 MICROGRAM(S): at 05:48

## 2022-01-05 NOTE — CONSULT NOTE ADULT - SUBJECTIVE AND OBJECTIVE BOX
[facility] PALLIATIVE MEDICINE CONSULT    CC: Patient is a 76y old  Female who presents with a chief complaint of SOB (05 Jan 2022 09:55)      HPI:  Mrs. 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 ROS +VILLARREAL and orthopnea, urinary incontinence, noncompliance with diet restriction during holiday. Admitted for acute respiratory failure with hypoxia 2/2 acute decompensated HFrEF, s/p NRB to bipap in the ER, improved with IV diureses and currently on nasal cannula. TTE with significantly reduced EF 25-30% (from prior TTE in 12/8/19). Evaluated by cardiology, recommending cardiac cath with the possibility of needing AICD thereafter. However pt does not want to remain in hospital for cardiac workup and wants to follow up OP, acknowledged risk vs benefits. Lifevest therapy was discussed by cardio with patient and she is amendable. Palliative consulted for support and goc.     Pt seen and examined at bedside in ER. Pt engaged in conversation, offered no acute complaints except the frustration of being in hospital, stuck in ER and unable to see family. She was emotional and tearful during our conversation.     Present Symptoms:     Dyspnea: No, comfortable on NC  Nausea/Vomiting:  No  Anxiety:  Yes with situation  Depression: Yes with situation  Fatigue:  No  Loss of appetite: Yes    Constipation:  No    Pain: No            Character-            Duration-            Effect-            Factors-            Frequency-            Location-            Severity-    Pain AD Score:  [email or web address] (press ctrl + left click to view)    Review of Systems: Reviewed                     Negative: No dyspnea or chest pain at rest                     Positive:  All others negative    PERTINENT PMH REVIEWED: Yes      PAST MEDICAL & SURGICAL HISTORY:  Hypertension    Hypothyroid    Anxiety    Iliac crest bone pain  cancer, right    Carcinoma  metastic stage 4 with stephanie to the iliac bone, colon, and lung    COPD (chronic obstructive pulmonary disease)    Chronic systolic heart failure    Hyperlipidemia    S/P cardiac catheterization  stent placed february 2019          FAMILY HISTORY:  FH: type 2 diabetes mellitus (Child)        Allergies    No Known Allergies    Intolerances        SOCIAL HISTORY:                      Substance history:                    Admitted from:  home lives with                     Children: 2                    Bahai/spirituality:                    Cultural concerns:    DECISION MAKER(s):  [] Health Care Proxy(s)  [x] Surrogate(s)  [] Guardian              Marcello [phone]    ADVANCE DIRECTIVES/TREATMENT PREFERENCES:  DNR YES NO  Completed on:                     MOLST  YES NO   Completed on:  Living Will  YES NO   Completed on:    Baseline ADLs (prior to admission):  Independent/ Dependent      Karnofsky/Palliative Performance Status Version 2:  %  http://npcrc.org/files/news/palliative_performance_scale_ppsv2.pdf    MEDICATIONS  (STANDING):  albuterol/ipratropium for Nebulization 3 milliLiter(s) Nebulizer every 6 hours  aspirin enteric coated 81 milliGRAM(s) Oral daily  atorvastatin 40 milliGRAM(s) Oral at bedtime  cyanocobalamin 1000 MICROGram(s) Oral daily  enoxaparin Injectable 40 milliGRAM(s) SubCutaneous daily  furosemide    Tablet 40 milliGRAM(s) Oral two times a day  levothyroxine 200 MICROGram(s) Oral daily  metoprolol succinate ER 12.5 milliGRAM(s) Oral daily  multivitamin 1 Tablet(s) Oral daily  sacubitril 24 mG/valsartan 26 mG 1 Tablet(s) Oral two times a day  ticagrelor 90 milliGRAM(s) Oral every 12 hours  tiotropium 18 MICROgram(s) Capsule 1 Capsule(s) Inhalation daily    MEDICATIONS  (PRN):  acetaminophen     Tablet .. 650 milliGRAM(s) Oral every 6 hours PRN Temp greater or equal to 38C (100.4F), Mild Pain (1 - 3)  traMADol 50 milliGRAM(s) Oral every 8 hours PRN Severe Pain (7 - 10)      PHYSICAL EXAM:    Vital Signs Last 24 Hrs  T(C): 36.4 (05 Jan 2022 11:51), Max: 36.9 (05 Jan 2022 06:18)  T(F): 97.6 (05 Jan 2022 11:51), Max: 98.4 (05 Jan 2022 06:18)  HR: 58 (05 Jan 2022 11:51) (52 - 83)  BP: 117/57 (05 Jan 2022 11:51) (116/52 - 128/65)  BP(mean): --  RR: 18 (05 Jan 2022 11:51) (18 - 19)  SpO2: 100% (05 Jan 2022 11:51) (97% - 100%)    General: resting comfortably and no acute distress.     HEENT: mucous membrane moist      Lungs: comfortable nonlabored      CV: S1/S2. Regular rate and rhythm    GI: +BS abdomen soft, nondistended, nontender     : intermittent incontinence    MSK: moves all 4 extremities, no cyanosis or edema      Neuro: nonfocal. alert  oriented x _4___ no focal deficits      Skin: warm and dry.      Psych: normal affect. appropriate behavior. tearful when talking about being stuck in hospital    LABS:                        9.7    6.79  )-----------( 323      ( 05 Jan 2022 07:48 )             32.4     01-05    140  |  103  |  21.0<H>  ----------------------------<  101<H>  3.5   |  23.0  |  1.22    Ca    10.2      05 Jan 2022 07:48  Phos  3.4     01-03  Mg     2.1     01-04    I&O's Summary      RADIOLOGY & ADDITIONAL STUDIES:    TTE 1/3/22  Summary:   1. Endocardium opacified with Definity echocontrast.   2. Left ventricular ejection fraction, by visual estimation, is 25 to 30%.   3. Severely decreased segmental left ventricular systolic function.   4. Multiple left ventricular regional wall motion abnormalities exist   significant for apical wall akinesis and severe inferolateral wall   hypokinesis.   5. There is mild eccentric left ventricular hypertrophy.   6. The mitral in-flow pattern reveals no discernable A-wave, therefore   no comment on diastolic function can be made.   7. Normal RV size with mildly reduced RV systolic function, estimated PASP 27 mmHg.   8. Severely enlarged left atrium.   9. Mild mitral annular calcification.  10. Moderate to severe mitral valve regurgitation.  11. Moderately decreased posterior mitral leaflet mobility.  12. Sclerotic aortic valve with normal opening.  13. Trivial pericardial effusion.  14. Moderate pleural effusion in the left lateral region.  15. Compared to the prior TTE study from 12/8/19, LV systolic function   remains severely reduced.    Vinny Winters DO Electronically signed on 1/3/2022 at 5:36:32 PM      CXR 1/3/22    ACC: [id number] EXAM:  XR CHEST PORTABLE URGENT 1V                          PROCEDURE DATE:  01/03/2022      INTERPRETATION:  TECHNIQUE: Single portable view of the chest.    COMPARISON:  12/6/2019    CLINICAL HISTORY: Shortness of Breath    FINDINGS:    Single frontal view of the chest demonstrates moderate CHF and small   bilateral pleural effusions. The cardiomediastinal silhouette is   enlarged. No acute osseous abnormalities. Overlying EKG leads and wires   are noted. Please refer to the subsequent chest CT for further details.    IMPRESSION: Moderate CHF and small bilateral pleural effusions.   Cardiomegaly.    --- End of Report ---    SHANNAN MC MD; Attending Radiologist  This document has been electronically signed. Apolinar  3 2022  9:44AM    CT Chest 1/3/22    ACC: [id number] EXAM:  CT CHEST                          PROCEDURE DATE:  01/03/2022      INTERPRETATION:  INDICATION: Respiratory failure    TECHNIQUE: Volumetric images of the chest without intravenous contrast.   Maximum intensity projection images were generated.    COMPARISON: December 6, 2019.    FINDINGS:    LUNGS/AIRWAYS/PLEURA: Patent airways to the segmental bronchi. Mild   centrilobular emphysema. Stable 1.5 cm ovoid nodule in the anterior right   lower lobe (3-126). Stable few other bilateral subcentimeter lung   nodules. Mild bilateral interlobular septal thickening. Small pleural   effusions and associated passive atelectasis in the lower lobes.    LYMPH NODES/MEDIASTINUM: Unremarkable thyroid. No enlarged lymph nodes.    HEART/VASCULATURE: Cardiomegaly. Unremarkable pericardium. Coronary   artery calcified plaque and LCx stent. Dilated main pulmonary artery,   larger than the ascending aorta. Aberrant right subclavian artery, normal   variant. No aortic aneurysm.    UPPER ABDOMEN:Cholelithiasis. Right kidney cyst.    BONES/SOFT TISSUES: Unchanged asymmetric density in the lateral right   breast. Degenerative changes of the spine.      IMPRESSION:    Mild pulmonary edema, small pleural effusions, and associated passive   atelectasis of the lower lobes.    Stable middle lobe 1.5 cm nodule, unchanged dating back to 2015,   compatible with a benign entity such as a granuloma or hamartoma.    --- End of Report ---      AUREA FARIA M.D., ATTENDING RADIOLOGIST  This document has been electronically signed. Apolinar  3 2022  9:39AM     [facility] PALLIATIVE MEDICINE CONSULT    CC: Patient is a 76y old  Female who presents with a chief complaint of SOB (05 Jan 2022 09:55)      HPI:  Mrs. 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 ROS +VILLARREAL and orthopnea, urinary incontinence, noncompliance with diet restriction during holiday. Admitted for acute respiratory failure with hypoxia 2/2 acute decompensated HFrEF, s/p NRB to bipap in the ER, improved with IV diureses and currently on nasal cannula. TTE with significantly reduced EF 25-30% (from prior TTE in 12/8/19). Evaluated by cardiology, recommending cardiac cath with the possibility of needing AICD thereafter. However pt does not want to remain in hospital for cardiac workup and wants to follow up OP, acknowledged risk vs benefits. Lifevest therapy was discussed by cardio with patient and she is amendable. Palliative consulted for support and goc.     Pt seen and examined at bedside in ER. Pt engaged in conversation, offered no acute complaints except the frustration of being in hospital, stuck in ER and unable to see family. She was emotional and tearful during our conversation.     Present Symptoms:     Dyspnea: No, comfortable on NC  Nausea/Vomiting:  No  Anxiety:  Yes with situation  Depression: Yes with situation  Fatigue:  No  Loss of appetite: Yes    Constipation:  No    Pain: No            Character-            Duration-            Effect-            Factors-            Frequency-            Location-            Severity-    Pain AD Score:  [email or web address] (press ctrl + left click to view)    Review of Systems: Reviewed                     Negative: No dyspnea or chest pain at rest                     Positive:  All others negative    PERTINENT PMH REVIEWED: Yes      PAST MEDICAL & SURGICAL HISTORY:  Hypertension    Hypothyroid    Anxiety    Iliac crest bone pain  cancer, right    Carcinoma  metastic stage 4 with stephanie to the iliac bone, colon, and lung    COPD (chronic obstructive pulmonary disease)    Chronic systolic heart failure    Hyperlipidemia    S/P cardiac catheterization  stent placed february 2019          FAMILY HISTORY:  FH: type 2 diabetes mellitus (Child)        Allergies    No Known Allergies    Intolerances        SOCIAL HISTORY:                      Substance history:                    Admitted from:  home lives with                     Children: 2                    Anabaptism/spirituality:                    Cultural concerns:    DECISION MAKER(s):  [] Health Care Proxy(s)  [x] Surrogate(s)  [] Guardian              Marcello [phone]    ADVANCE DIRECTIVES/TREATMENT PREFERENCES:  DNR YES NO  Completed on:                     MOLST  YES NO   Completed on:  Living Will  YES NO   Completed on:    Baseline ADLs (prior to admission): independent and ambulates with cane at baseline    Karnofsky/Palliative Performance Status Version 2: 50 %  http://npcrc.org/files/news/palliative_performance_scale_ppsv2.pdf    MEDICATIONS  (STANDING):  albuterol/ipratropium for Nebulization 3 milliLiter(s) Nebulizer every 6 hours  aspirin enteric coated 81 milliGRAM(s) Oral daily  atorvastatin 40 milliGRAM(s) Oral at bedtime  cyanocobalamin 1000 MICROGram(s) Oral daily  enoxaparin Injectable 40 milliGRAM(s) SubCutaneous daily  furosemide    Tablet 40 milliGRAM(s) Oral two times a day  levothyroxine 200 MICROGram(s) Oral daily  metoprolol succinate ER 12.5 milliGRAM(s) Oral daily  multivitamin 1 Tablet(s) Oral daily  sacubitril 24 mG/valsartan 26 mG 1 Tablet(s) Oral two times a day  ticagrelor 90 milliGRAM(s) Oral every 12 hours  tiotropium 18 MICROgram(s) Capsule 1 Capsule(s) Inhalation daily    MEDICATIONS  (PRN):  acetaminophen     Tablet .. 650 milliGRAM(s) Oral every 6 hours PRN Temp greater or equal to 38C (100.4F), Mild Pain (1 - 3)  traMADol 50 milliGRAM(s) Oral every 8 hours PRN Severe Pain (7 - 10)      PHYSICAL EXAM:    Vital Signs Last 24 Hrs  T(C): 36.4 (05 Jan 2022 11:51), Max: 36.9 (05 Jan 2022 06:18)  T(F): 97.6 (05 Jan 2022 11:51), Max: 98.4 (05 Jan 2022 06:18)  HR: 58 (05 Jan 2022 11:51) (52 - 83)  BP: 117/57 (05 Jan 2022 11:51) (116/52 - 128/65)  BP(mean): --  RR: 18 (05 Jan 2022 11:51) (18 - 19)  SpO2: 100% (05 Jan 2022 11:51) (97% - 100%)    General: resting comfortably and no acute distress.     HEENT: mucous membrane moist      Lungs: comfortable nonlabored      CV: S1/S2. Regular rate and rhythm    GI: +BS abdomen soft, nondistended, nontender     : intermittent incontinence    MSK: moves all 4 extremities, no cyanosis or edema      Neuro: nonfocal. alert  oriented x _4___ no focal deficits      Skin: warm and dry.      Psych: normal affect. appropriate behavior. tearful when talking about being stuck in hospital    LABS:                        9.7    6.79  )-----------( 323      ( 05 Jan 2022 07:48 )             32.4     01-05    140  |  103  |  21.0<H>  ----------------------------<  101<H>  3.5   |  23.0  |  1.22    Ca    10.2      05 Jan 2022 07:48  Phos  3.4     01-03  Mg     2.1     01-04    I&O's Summary      RADIOLOGY & ADDITIONAL STUDIES:    TTE 1/3/22  Summary:   1. Endocardium opacified with Definity echocontrast.   2. Left ventricular ejection fraction, by visual estimation, is 25 to 30%.   3. Severely decreased segmental left ventricular systolic function.   4. Multiple left ventricular regional wall motion abnormalities exist   significant for apical wall akinesis and severe inferolateral wall   hypokinesis.   5. There is mild eccentric left ventricular hypertrophy.   6. The mitral in-flow pattern reveals no discernable A-wave, therefore   no comment on diastolic function can be made.   7. Normal RV size with mildly reduced RV systolic function, estimated PASP 27 mmHg.   8. Severely enlarged left atrium.   9. Mild mitral annular calcification.  10. Moderate to severe mitral valve regurgitation.  11. Moderately decreased posterior mitral leaflet mobility.  12. Sclerotic aortic valve with normal opening.  13. Trivial pericardial effusion.  14. Moderate pleural effusion in the left lateral region.  15. Compared to the prior TTE study from 12/8/19, LV systolic function   remains severely reduced.    Vinny Winters DO Electronically signed on 1/3/2022 at 5:36:32 PM      CXR 1/3/22    ACC: [id number] EXAM:  XR CHEST PORTABLE URGENT 1V                          PROCEDURE DATE:  01/03/2022      INTERPRETATION:  TECHNIQUE: Single portable view of the chest.    COMPARISON:  12/6/2019    CLINICAL HISTORY: Shortness of Breath    FINDINGS:    Single frontal view of the chest demonstrates moderate CHF and small   bilateral pleural effusions. The cardiomediastinal silhouette is   enlarged. No acute osseous abnormalities. Overlying EKG leads and wires   are noted. Please refer to the subsequent chest CT for further details.    IMPRESSION: Moderate CHF and small bilateral pleural effusions.   Cardiomegaly.    --- End of Report ---    SHANNAN MC MD; Attending Radiologist  This document has been electronically signed. Apolinar  3 2022  9:44AM    CT Chest 1/3/22    ACC: [id number] EXAM:  CT CHEST                          PROCEDURE DATE:  01/03/2022      INTERPRETATION:  INDICATION: Respiratory failure    TECHNIQUE: Volumetric images of the chest without intravenous contrast.   Maximum intensity projection images were generated.    COMPARISON: December 6, 2019.    FINDINGS:    LUNGS/AIRWAYS/PLEURA: Patent airways to the segmental bronchi. Mild   centrilobular emphysema. Stable 1.5 cm ovoid nodule in the anterior right   lower lobe (3-126). Stable few other bilateral subcentimeter lung   nodules. Mild bilateral interlobular septal thickening. Small pleural   effusions and associated passive atelectasis in the lower lobes.    LYMPH NODES/MEDIASTINUM: Unremarkable thyroid. No enlarged lymph nodes.    HEART/VASCULATURE: Cardiomegaly. Unremarkable pericardium. Coronary   artery calcified plaque and LCx stent. Dilated main pulmonary artery,   larger than the ascending aorta. Aberrant right subclavian artery, normal   variant. No aortic aneurysm.    UPPER ABDOMEN:Cholelithiasis. Right kidney cyst.    BONES/SOFT TISSUES: Unchanged asymmetric density in the lateral right   breast. Degenerative changes of the spine.      IMPRESSION:    Mild pulmonary edema, small pleural effusions, and associated passive   atelectasis of the lower lobes.    Stable middle lobe 1.5 cm nodule, unchanged dating back to 2015,   compatible with a benign entity such as a granuloma or hamartoma.    --- End of Report ---      AUREA FARIA M.D., ATTENDING RADIOLOGIST  This document has been electronically signed. Apolinar  3 2022  9:39AM     [facility] PALLIATIVE MEDICINE CONSULT    CC: Patient is a 76y old  Female who presents with a chief complaint of SOB (05 Jan 2022 09:55)      HPI:  Mrs. 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 ROS +VILLARREAL and orthopnea, urinary incontinence, noncompliance with diet restriction during holiday. Admitted for acute respiratory failure with hypoxia 2/2 acute decompensated HFrEF, s/p NRB to bipap in the ER, improved with IV diureses and currently on nasal cannula. TTE with significantly reduced EF 25-30% (from prior TTE in 12/8/19). Evaluated by cardiology, recommending cardiac cath with the possibility of needing AICD thereafter. However pt does not want to remain in hospital for cardiac workup and wants to follow up OP, acknowledged risk vs benefits. Lifevest therapy was discussed by cardio with patient and she is amendable. Palliative consulted for support and goc.     Pt seen and examined at bedside in ER. Pt engaged in conversation, offered no acute complaints except the frustration of being in hospital, stuck in ER and unable to see family. She was emotional and tearful during our conversation.     Present Symptoms:     Dyspnea: No, comfortable on NC  Nausea/Vomiting:  No  Anxiety:  Yes with situation  Depression: Yes with situation  Fatigue:  No  Loss of appetite: Yes    Constipation:  No    Pain: No            Character-            Duration-            Effect-            Factors-            Frequency-            Location-            Severity-    Pain AD Score:  [email or web address]/[email or web address] (press ctrl + left click to view)    Review of Systems: Reviewed                     Negative: No dyspnea or chest pain at rest                     Positive:  All others negative    PERTINENT PMH REVIEWED: Yes      PAST MEDICAL & SURGICAL HISTORY:  Hypertension    Hypothyroid    Anxiety    Iliac crest bone pain  cancer, right    Carcinoma  metastic stage 4 with stephanie to the iliac bone, colon, and lung    COPD (chronic obstructive pulmonary disease)    Chronic systolic heart failure    Hyperlipidemia    S/P cardiac catheterization  stent placed february 2019          FAMILY HISTORY:  FH: type 2 diabetes mellitus (Child)        Allergies    No Known Allergies    Intolerances        SOCIAL HISTORY:                      Substance history:                    Admitted from:  home lives with                     Children: 2                    Episcopalian/spirituality:                    Cultural concerns:    DECISION MAKER(s):  [x] Health Care Proxy(s)  [] Surrogate(s)  [] Guardian             HCP is designated as daughter Kiah Fritz [phone]. living will in [place] from Feb 2019 hospitalization    ADVANCE DIRECTIVES/TREATMENT PREFERENCES:  DNR YES NO  Completed on:                     MOLST  YES NO   Completed on:  Living Will  YES NO   Completed on:    Baseline ADLs (prior to admission): independent and ambulates with cane at baseline    Karnofsky/Palliative Performance Status Version 2: 50 %  http://npcrc.org/files/news/palliative_performance_scale_ppsv2.pdf    MEDICATIONS  (STANDING):  albuterol/ipratropium for Nebulization 3 milliLiter(s) Nebulizer every 6 hours  aspirin enteric coated 81 milliGRAM(s) Oral daily  atorvastatin 40 milliGRAM(s) Oral at bedtime  cyanocobalamin 1000 MICROGram(s) Oral daily  enoxaparin Injectable 40 milliGRAM(s) SubCutaneous daily  furosemide    Tablet 40 milliGRAM(s) Oral two times a day  levothyroxine 200 MICROGram(s) Oral daily  metoprolol succinate ER 12.5 milliGRAM(s) Oral daily  multivitamin 1 Tablet(s) Oral daily  sacubitril 24 mG/valsartan 26 mG 1 Tablet(s) Oral two times a day  ticagrelor 90 milliGRAM(s) Oral every 12 hours  tiotropium 18 MICROgram(s) Capsule 1 Capsule(s) Inhalation daily    MEDICATIONS  (PRN):  acetaminophen     Tablet .. 650 milliGRAM(s) Oral every 6 hours PRN Temp greater or equal to 38C (100.4F), Mild Pain (1 - 3)  traMADol 50 milliGRAM(s) Oral every 8 hours PRN Severe Pain (7 - 10)      PHYSICAL EXAM:    Vital Signs Last 24 Hrs  T(C): 36.4 (05 Jan 2022 11:51), Max: 36.9 (05 Jan 2022 06:18)  T(F): 97.6 (05 Jan 2022 11:51), Max: 98.4 (05 Jan 2022 06:18)  HR: 58 (05 Jan 2022 11:51) (52 - 83)  BP: 117/57 (05 Jan 2022 11:51) (116/52 - 128/65)  BP(mean): --  RR: 18 (05 Jan 2022 11:51) (18 - 19)  SpO2: 100% (05 Jan 2022 11:51) (97% - 100%)    General: resting comfortably and no acute distress.     HEENT: mucous membrane moist      Lungs: comfortable nonlabored      CV: S1/S2. Regular rate and rhythm    GI: +BS abdomen soft, nondistended, nontender     : intermittent incontinence    MSK: moves all 4 extremities, no cyanosis or edema      Neuro: nonfocal. alert  oriented x _4___ no focal deficits      Skin: warm and dry.      Psych: normal affect. appropriate behavior. tearful when talking about being stuck in hospital    LABS:                        9.7    6.79  )-----------( 323      ( 05 Jan 2022 07:48 )             32.4     01-05    140  |  103  |  21.0<H>  ----------------------------<  101<H>  3.5   |  23.0  |  1.22    Ca    10.2      05 Jan 2022 07:48  Phos  3.4     01-03  Mg     2.1     01-04    I&O's Summary      RADIOLOGY & ADDITIONAL STUDIES:    TTE 1/3/22  Summary:   1. Endocardium opacified with Definity echocontrast.   2. Left ventricular ejection fraction, by visual estimation, is 25 to 30%.   3. Severely decreased segmental left ventricular systolic function.   4. Multiple left ventricular regional wall motion abnormalities exist   significant for apical wall akinesis and severe inferolateral wall   hypokinesis.   5. There is mild eccentric left ventricular hypertrophy.   6. The mitral in-flow pattern reveals no discernable A-wave, therefore   no comment on diastolic function can be made.   7. Normal RV size with mildly reduced RV systolic function, estimated PASP 27 mmHg.   8. Severely enlarged left atrium.   9. Mild mitral annular calcification.  10. Moderate to severe mitral valve regurgitation.  11. Moderately decreased posterior mitral leaflet mobility.  12. Sclerotic aortic valve with normal opening.  13. Trivial pericardial effusion.  14. Moderate pleural effusion in the left lateral region.  15. Compared to the prior TTE study from 12/8/19, LV systolic function   remains severely reduced.    Vinny JaimejimDO Electronically signed on 1/3/2022 at 5:36:32 PM      CXR 1/3/22    ACC: [id number] EXAM:  XR CHEST PORTABLE URGENT 1V                          PROCEDURE DATE:  01/03/2022      INTERPRETATION:  TECHNIQUE: Single portable view of the chest.    COMPARISON:  12/6/2019    CLINICAL HISTORY: Shortness of Breath    FINDINGS:    Single frontal view of the chest demonstrates moderate CHF and small   bilateral pleural effusions. The cardiomediastinal silhouette is   enlarged. No acute osseous abnormalities. Overlying EKG leads and wires   are noted. Please refer to the subsequent chest CT for further details.    IMPRESSION: Moderate CHF and small bilateral pleural effusions.   Cardiomegaly.    --- End of Report ---    SHANNAN MC MD; Attending Radiologist  This document has been electronically signed. Apolinar  3 2022  9:44AM    CT Chest 1/3/22    ACC: [id number] EXAM:  CT CHEST                          PROCEDURE DATE:  01/03/2022      INTERPRETATION:  INDICATION: Respiratory failure    TECHNIQUE: Volumetric images of the chest without intravenous contrast.   Maximum intensity projection images were generated.    COMPARISON: December 6, 2019.    FINDINGS:    LUNGS/AIRWAYS/PLEURA: Patent airways to the segmental bronchi. Mild   centrilobular emphysema. Stable 1.5 cm ovoid nodule in the anterior right   lower lobe (3-126). Stable few other bilateral subcentimeter lung   nodules. Mild bilateral interlobular septal thickening. Small pleural   effusions and associated passive atelectasis in the lower lobes.    LYMPH NODES/MEDIASTINUM: Unremarkable thyroid. No enlarged lymph nodes.    HEART/VASCULATURE: Cardiomegaly. Unremarkable pericardium. Coronary   artery calcified plaque and LCx stent. Dilated main pulmonary artery,   larger than the ascending aorta. Aberrant right subclavian artery, normal   variant. No aortic aneurysm.    UPPER ABDOMEN:Cholelithiasis. Right kidney cyst.    BONES/SOFT TISSUES: Unchanged asymmetric density in the lateral right   breast. Degenerative changes of the spine.      IMPRESSION:    Mild pulmonary edema, small pleural effusions, and associated passive   atelectasis of the lower lobes.    Stable middle lobe 1.5 cm nodule, unchanged dating back to 2015,   compatible with a benign entity such as a granuloma or hamartoma.    --- End of Report ---      AUREA FARIA M.D., ATTENDING RADIOLOGIST  This document has been electronically signed. Apolinar  3 2022  9:39AM

## 2022-01-05 NOTE — CONSULT NOTE ADULT - CONVERSATION DETAILS
Met with pt at bedside to introduce role of palliative service.     Pt was engaged in conversation and expressed good insight into her comorbidities including advanced cardiomyopathy/CHF. She was able to elaborate on conversation held earlier with cardiology regarding need for cardiac catherization to determine further treatment course. She expressed that she does want to pursue all medical interventions related to her advance illness however "I can't do it in this setting, I am just not happy" in reference to the overwhelming nature of the ER setting and medical care thus far contributed by the pandemic. She accepts the risks including acknowledgement of potential fatality of foregoing cardiac cath at present time and opting to purse Lifevest. She shared that she has been thru other hardships in her lifetime including her cancer diagnosis and treatment 6 years ago. She believes cardiomyopathy may be secondary to the associated side effects of "Opdivo."      Emotional support provided. She was appreciative of the support and visit. All questions answered at this time.

## 2022-01-05 NOTE — DISCHARGE NOTE PROVIDER - HOSPITAL COURSE
76 y.o. Female with hx of HTN, HLD, moderate to severe MR CAD/MI ICM lasted PCI 2019 to OSMAR to LCx  of RCA left to right collaterals mild LAD/LM, Lung cancer baseline O2 therapy with METS s/p chemotherapy/ immunotherapy and radiation now in remission, COPD former smoker and chronic systolic HFrEF EF ~ 35 to 40% on chronic 3l via nc o2 qhs who presents with worsening sob, VILLARREAL and orthopnea. Noted to be hypoxic to low 80s in the ed with increased work of breathing, elevated probnp and noted b/l congestion on imaging. Pt admitted with acute resp failure with hypoxia 2/2 pulm edema 2/2 acute on chronic systolic HFref exacerbation. Pt was seen in consultation with   Cardiology team [facility] and was offered ischemic work up with [facility] as well as possible ICD placement. However pt declined further test and intervenition at this time and prefers to f/u with her primary cardiologist Dr. Hernandez. She verbalised understanding of the risk of leaving w/o further test and  possible interventions. She diuresed with few doses of IV furosemide and on discharge back to euvolemic and will continue Furosemide 40 mg twice a day in addition to entresto, metoprolol, DAPT, statins.   She also was provided with Life vest before d/c.     In regards of rest medical condition  #Leukocytosis - no evidence of active infection;   #Chronic obstructive pulmonary disease - w/o exacerbation during this admission, at home on nocturnal oxygen at 2-3 L  #Hx lung cancer with mts dx in stomach and R hip, in remission as per pt with chronic pain - OP f/u with [place] blood and cancer. c/w tylenol prn for mild pain  -c/w tramadol prn for severe pain  # Normocytic anemia - OP f/u   #Hypothyroidism - c/w synthroid po qd  #Insomnia - started Mirtazapin 15 mg qhs.    Vital Signs Last 24 Hrs  T(C): 36.4 (05 Jan 2022 11:51), Max: 36.9 (05 Jan 2022 06:18)  T(F): 97.6 (05 Jan 2022 11:51), Max: 98.4 (05 Jan 2022 06:18)  HR: 58 (05 Jan 2022 11:51) (52 - 83)  BP: 117/57 (05 Jan 2022 11:51) (116/52 - 128/65)  BP(mean): --  RR: 18 (05 Jan 2022 11:51) (18 - 19)  SpO2: 100% (05 Jan 2022 11:51) (97% - 100%)    CONSTITUTIONAL: NAD, well-developed, well-groomed  ENMT: Moist oral mucosa, no pharyngeal injection or exudates; normal dentition; No JVD  RESPIRATORY: Normal respiratory effort; lungs are clear to auscultation bilaterally  CARDIOVASCULAR: Regular rate and rhythm, distal normal S1 and S2, systolic murmur at apex, trace extremity edema; Peripheral pulses are 2+ bilaterally  ABDOMEN: Nontender to palpation, normoactive bowel sounds, no rebound/guarding; No hepatosplenomegaly  MUSCLOSKELETAL:  no clubbing or cyanosis of digits; no joint swelling or tenderness to palpation  PSYCH: A+O to person, place, and time; affect appropriate  NEUROLOGY: CN 2-12 are intact and symmetric; no gross sensory deficits; was observed moving all 4 ext against gravity cooperating with exam.   SKIN: No rashes; no palpable lesions    spent 35 min to coordinate  discharge

## 2022-01-05 NOTE — DISCHARGE NOTE NURSING/CASE MANAGEMENT/SOCIAL WORK - NSDCPEFALRISK_GEN_ALL_CORE
For information on Fall & Injury Prevention, visit: [email or web address] OR  [email or web address] OR  https://www.cdc.gov/steadi/patient.html

## 2022-01-05 NOTE — DISCHARGE NOTE NURSING/CASE MANAGEMENT/SOCIAL WORK - PATIENT PORTAL LINK FT
You can access the FollowMyHealth Patient Portal offered by [facility] by registering at the following website: [email or web address]. By joining [facility]’s FollowMyHealth portal, you will also be able to view your health information using other applications (apps) compatible with our system.

## 2022-01-05 NOTE — CONSULT NOTE ADULT - ASSESSMENT
76F with HTN, HLD, CAD s/p PCI/OSMAR, hx of lung cancer (in remission), COPD/emphysema on home O2, HFrEF admitted for acute decompensated CHF.     PLAN     76F with HTN, HLD, CAD s/p PCI/OSMAR, hx of lung cancer (in remission), COPD/emphysema on home O2, HFrEF admitted for acute decompensated CHF.     PLAN    Acute Decompensated HFrEF  Severe Cardiomyopathy/ICM  - pt declined to undergo cardiac cath this admission, agreed to Lifevest and plans to follow up OP cardiology for ongoing workup  - c/w lasix    Acute on Chronic Respiratory Failure   - s/p bipap this admission, currently at baseline O2 NC   - nebs, bronchodilators    Hx of Lung Cancer  - diagnosed ~6 yrs ago s/p chemo, RT, immunotherapy, currently in remission per pt    Insomnia  - recommend mirtazapine 15mg qHS (will also address anxiety over her medical conditions)    Advance Care Planning  - surrogate is  [facility]  - full code    Palliative Care Encounter  - see goc above  - pt wants to continue pursuit of all medical interventions at this time  - recommend advance illness referral on discharge  - long term prognosis is poor with high risk of rehospitalization in setting of advance CM/CHF   76F with HTN, HLD, CAD s/p PCI/OSMAR, hx of lung cancer (in remission), COPD/emphysema on home O2, HFrEF admitted for acute decompensated CHF.     PLAN    Acute Decompensated HFrEF  Severe Cardiomyopathy/ICM  - pt declined to undergo cardiac cath this admission, agreed to Lifevest and plans to follow up OP cardiology for ongoing workup  - c/w lasix    Acute on Chronic Respiratory Failure   - s/p bipap this admission, currently at baseline O2 NC   - nebs, bronchodilators    Hx of Lung Cancer  - diagnosed ~6 yrs ago s/p chemo, RT, immunotherapy, currently in remission per pt  - on surveillance with OP oncologist    Insomnia  - recommend mirtazapine 15mg qHS (will also address anxiety over her medical conditions)    Advance Care Planning  - surrogate is  [facility]  - currently full code    Palliative Care Encounter  - see goc above  - pt is known to palliative service, seen back in Feb 2019 and at that time was DNR/DNI and wanted to continue pursuit of all medical interventions   - pt's goc remains to continue pursing all treatment options including F/U with OP cardiologist for further workup  - recommend advance illness referral on discharge  - long term prognosis is poor with high risk of rehospitalization in setting of advance CM/CHF

## 2022-01-05 NOTE — DISCHARGE NOTE PROVIDER - CARE PROVIDER_API CALL
Ghulam Hernandez (DO)  Cardiovascular Disease; Internal Medicine  [address] [place] [address]  Phone: [phone]  Fax: [phone]  Established Patient  Follow Up Time: 1 week

## 2022-01-05 NOTE — DISCHARGE NOTE PROVIDER - NSDCCPCAREPLAN_GEN_ALL_CORE_FT
PRINCIPAL DISCHARGE DIAGNOSIS  Diagnosis: Acute on chronic respiratory failure with hypoxia  Assessment and Plan of Treatment: - please follow up with your cardiologist Dr. Hernandez, you will need further test for your heart  - we started two new medication for your heart  1. Furosemide  40 mg twice a day - " water medicine"  2. Metoprolol 12.5 mg once a day  - we provided you with Life vest, you may take it off only to take shower.      SECONDARY DISCHARGE DIAGNOSES  Diagnosis: CAD (coronary artery disease)  Assessment and Plan of Treatment: - take your medications as prescribed    Diagnosis: Stage 3 chronic kidney disease  Assessment and Plan of Treatment: - take your medications as prescribed    Diagnosis: COPD exacerbation  Assessment and Plan of Treatment: - take your inhailers as prescribed    Diagnosis: Hypothyroidism  Assessment and Plan of Treatment: - - take your medications as prescribed    Diagnosis: Insomnia  Assessment and Plan of Treatment: - we started you on Mirtazapin 15 mg at bed time as needed for insomnia.

## 2022-01-05 NOTE — DISCHARGE NOTE PROVIDER - NSDCMRMEDTOKEN_GEN_ALL_CORE_FT
aspirin 81 mg oral delayed release tablet: 1 tab(s) orally once a day  atorvastatin 40 mg oral tablet: 1 tab(s) orally once a day (at bedtime)  Entresto 24 mg-26 mg oral tablet: 1 tab(s) orally 2 times a day  furosemide 40 mg oral tablet: 1 tab(s) orally 2 times a day  ipratropium-albuterol 0.5 mg-2.5 mg/3 mLinhalation solution: 3 milliliter(s) by nebulizer 4 times a day as needed SOB  levothyroxine 200 mcg (0.2 mg)/mL oral solution: 1 milliliter(s) orally once a day  metoprolol succinate 25 mg oral capsule, extended release: 0.5 cap(s) orally once a day   mirtazapine 15 mg oral tablet: 1 tab(s) orally once a day (at bedtime), As Needed  for insomnia   ticagrelor 90 mg oral tablet: 1 tab(s) orally 2 times a day  tiotropium 18 mcg inhalation capsule: 1 cap(s) inhaled once a day  traMADol 50 mg oral tablet: 1 tab(s) orally 2 times a day

## 2022-01-05 NOTE — PROGRESS NOTE ADULT - SUBJECTIVE AND OBJECTIVE BOX
[facility], [facility]                              [address] [address]                                                 PHONE: [phone]                                                 FAX: [phone]  -----------------------------------------------------------------------------------------------  Pt seen and examined. FU for  shortness of breath     Overnight events/Complaints: Pt still reports shortness of breath. Wants to go home as she reports she does not want to stay in the hospital     Vital Signs Last 24 Hrs  T(C): 36.6 (04 Jan 2022 07:43), Max: 36.6 (03 Jan 2022 16:33)  T(F): 97.8 (04 Jan 2022 07:43), Max: 97.9 (03 Jan 2022 16:33)  HR: 49 (04 Jan 2022 07:43) (49 - 63)  BP: 109/52 (04 Jan 2022 07:43) (98/59 - 109/52)  BP(mean): --  RR: 18 (04 Jan 2022 07:43) (18 - 20)  SpO2: 100% (04 Jan 2022 07:43) (93% - 100%)  I&O's Summary      PHYSICAL EXAM:  Constitutional: Appears well; alert and co-operative  HEENT:     Head: Normocephalic and atraumatic  Neck: supple. No JVD  Cardiovascular: regular S1 S2  Respiratory: Lungs clear to auscultation; no crepitations, no wheeze  Gastrointestinal:  Soft, Non-tender, + BS	  Musculoskeletal: Normal range of motion. No edema  Skin: Warm and dry. No cyanosis . No diaphoresis.  Neurologic: Alert oriented to time place and person. Normal strength and no tremor.        LABS:                        10.0   7.06  )-----------( 328      ( 04 Jan 2022 03:19 )             33.5     01-04    138  |  103  |  24.7<H>  ----------------------------<  85  3.8   |  21.0<L>  |  1.46<H>    Ca    10.0      04 Jan 2022 03:19  Phos  3.4     01-03  Mg     2.1     01-04    TPro  6.9  /  Alb  3.9  /  TBili  0.8  /  DBili  x   /  AST  16  /  ALT  11  /  AlkPhos  105  01-03    CARDIAC MARKERS ( 03 Jan 2022 16:26 )  x     / 0.08 ng/mL / x     / x     / x      CARDIAC MARKERS ( 03 Jan 2022 10:49 )  x     / 0.11 ng/mL / x     / x     / x      CARDIAC MARKERS ( 03 Jan 2022 04:41 )  x     / <0.01 ng/mL / x     / x     / x          RADIOLOGY & ADDITIONAL STUDIES: (reviewed)  CT was independently visualized/reviewed  and demonstrated: pulm edema    CARDIOLOGY TESTING:(reviewed)     12 lead EKG independently visualized/reviewed  and demonstrated NSR TWI none specific changes       < from: TTE Echo Complete w/Doppler (12.08.19 @ 08:44) >    Summary:   1. Severely decreased segmental left ventricular systolic function. Left   ventricular ejection fraction, by visual estimation, is 30 to 35%.   Lateral and inferior segments hypokinetic.   2. Low normal RV systolic function.   3. Severely enlarged left atrium.   4. Mildly increased left ventricular internal cavity size.   5. There is mild concentric left ventricular hypertrophy.   6. Moderate to severe mitral valve regurgitation, ischemic mitral   regurgitation.   7. Mild tricuspid regurgitation.   8. Mildly dilated pulmonary artery.   9. Trivial pericardial effusion.    Lorena Aleman DO Electronically signed on 12/8/2019 at 1:58:55 PM    < end of copied text >        < from: Cardiac Cath Lab - Adult (02.08.19 @ 18:08) >  VENTRICLES: LVEF 40%  CORONARY VESSELS: The coronary circulation is right dominant.  LM:   --  LM: Normal.  LAD:   --  LAD: Angiography showed minor luminal irregularities with no  flow limiting lesions.  CX:   --  Proximal circumflex: There was a 100 % stenosis.  RCA:   --  Mid RCA: There was a 100 % stenosis. There was poor collateral  blood supply to the distal myocardium.  COMPLICATIONS: There were no complications.  DIAGNOSTIC IMPRESSIONS: Acute occlusion of proximal LCX and  of RCA with  left to right collaterals. The LCX was treated with  thrombectomy/PTCA/STENT (OSMAR-Resolute) with good result.  DIAGNOSTIC RECOMMENDATIONS: ASA and Brilinta  INTERVENTIONAL IMPRESSIONS: Acute occlusion of proximal LCX and  of RCA  with left to right collaterals. The LCX was treated with  thrombectomy/PTCA/STENT (OSMAR-Resolute) with good result.  INTERVENTIONAL RECOMMENDATIONS: ASA and Gretata  Prepared and signed by  Hipolito Avila MD    < end of copied text >      < from: CT Chest No Cont (01.03.22 @ 09:25) >  IMPRESSION:    Mild pulmonary edema, small pleural effusions, and associated passive   atelectasis of the lower lobes.    Stable middle lobe 1.5 cm nodule, unchanged dating back to 2015,   compatible with a benign entity such as a granuloma or hamartoma.    < end of copied text >    ECHOCARDIOGRAM independently visualized/reviewed and demonstrated :   Summary:   1. Endocardium opacified with Definity echocontrast.   2. Left ventricular ejection fraction, by visual estimation, is 25 to   30%.   3. Severely decreased segmental left ventricular systolic function.   4. Multiple left ventricular regional wall motion abnormalities exist   significant for apical wall akinesis and severe inferolateral wall   hypokinesis.   5. There is mild eccentric left ventricular hypertrophy.   6. The mitral in-flow pattern reveals no discernable A-wave, therefore   no comment on diastolic function can be made.   7. Normal RV size with mildly reduced RV systolic function, estimated   PASP 27 mmHg.   8. Severely enlarged left atrium.   9. Mild mitral annular calcification.  10. Moderate to severe mitral valve regurgitation.  11. Moderately decreased posterior mitral leaflet mobility.  12. Sclerotic aortic valve with normal opening.  13. Trivial pericardial effusion.  14. Moderate pleural effusion in the left lateral region.  15. Compared to the prior TTE study from 12/8/19, LV systolic function   remains severely reduced.      MEDICATIONS:(reviewed)  MEDICATIONS  (STANDING):  albuterol/ipratropium for Nebulization 3 milliLiter(s) Nebulizer every 6 hours  aspirin enteric coated 81 milliGRAM(s) Oral daily  atorvastatin 40 milliGRAM(s) Oral at bedtime  cyanocobalamin 1000 MICROGram(s) Oral daily  furosemide   Injectable 40 milliGRAM(s) IV Push daily  levothyroxine 200 MICROGram(s) Oral daily  metoprolol succinate ER 12.5 milliGRAM(s) Oral daily  multivitamin 1 Tablet(s) Oral daily  sacubitril 24 mG/valsartan 26 mG 1 Tablet(s) Oral two times a day  ticagrelor 90 milliGRAM(s) Oral every 12 hours  tiotropium 18 MICROgram(s) Capsule 1 Capsule(s) Inhalation daily      ASSESSMENT AND PLAN:    76y Female with past medical history significant for HTN HLD HFrEF EF ~ 35 to 40% moderate to severe MR CAD/MI ICM lasted PCI 2019 to OSMAR to LCx  of RCA left to right collaterals mild LAD/LM Lung cancer baseline O2 therapy with METS COPD ex smoker who presented with dyspnea VILLARREAL orthopnea without PND, weight gain or chest pain.    CT of chest WO showed pulmonary edema small pleural effusion with passive atelectasis of the lower lobes    Troponin elevation possibly secondary HF and severe CMP  Continue ASA and Brilinta  Continue lasix po 40 mg BID  Continue Toprol and Entresto  Pt needs cardiac cath given persistent CMP prior to consideration for AICD. However, pt does not want to stay in the hospital for work-up      
                [facility]                                   [address] [place] [address]                                                      PHONE: [phone]                                                         FAX: [phone]  [email or web address]  ---------------------------------------------------------------------------------------------------------------------------------    Overnight events/patient complaints:  NAD feeling well today     No Known Allergies    MEDICATIONS  (STANDING):  albuterol/ipratropium for Nebulization 3 milliLiter(s) Nebulizer every 6 hours  aspirin enteric coated 81 milliGRAM(s) Oral daily  atorvastatin 40 milliGRAM(s) Oral at bedtime  cyanocobalamin 1000 MICROGram(s) Oral daily  enoxaparin Injectable 40 milliGRAM(s) SubCutaneous daily  furosemide    Tablet 40 milliGRAM(s) Oral two times a day  levothyroxine 200 MICROGram(s) Oral daily  metoprolol succinate ER 12.5 milliGRAM(s) Oral daily  multivitamin 1 Tablet(s) Oral daily  sacubitril 24 mG/valsartan 26 mG 1 Tablet(s) Oral two times a day  ticagrelor 90 milliGRAM(s) Oral every 12 hours  tiotropium 18 MICROgram(s) Capsule 1 Capsule(s) Inhalation daily    MEDICATIONS  (PRN):  acetaminophen     Tablet .. 650 milliGRAM(s) Oral every 6 hours PRN Temp greater or equal to 38C (100.4F), Mild Pain (1 - 3)  traMADol 50 milliGRAM(s) Oral every 8 hours PRN Severe Pain (7 - 10)      Vital Signs Last 24 Hrs  T(C): 36.7 (2022 09:17), Max: 36.9 (2022 06:18)  T(F): 98.1 (2022 09:17), Max: 98.4 (2022 06:18)  HR: 52 (2022 09:17) (52 - 83)  BP: 128/65 (2022 09:17) (116/52 - 128/65)  BP(mean): --  RR: 18 (2022 09:17) (18 - 19)  SpO2: 100% (2022 09:17) (97% - 100%)  ICU Vital Signs Last 24 Hrs  JOEY ASHWIN  I&O's Detail    I&O's Summary    Drug Dosing Weight  JOEY CHRISTOPHER      PHYSICAL EXAM:  General: Appears well developed, alert and cooperative.  HEENT: Head; normocephalic, atraumatic.  Eyes: Pupils reactive, cornea wnl.  Neck: Supple, no nodes adenopathy, no NVD or carotid bruit or thyromegaly.  CARDIOVASCULAR: Normal S1 and S2, 1/6 murmur, rub, gallop or lift.   LUNGS: Decrease BS B/L at the lower bases   ABDOMEN: Soft, nontender without mass or organomegaly. bowel sounds normoactive.  EXTREMITIES: No clubbing, cyanosis or edema. Distal pulses wnl.   SKIN: warm and dry with normal turgor.  NEURO: Alert/oriented x 3/normal motor exam. No pathologic reflexes.    PSYCH: normal affect.        LABS:                        9.7    6.79  )-----------( 323      ( 2022 07:48 )             32.4     01-05    140  |  103  |  21.0<H>  ----------------------------<  101<H>  3.5   |  23.0  |  1.22    Ca    10.2      2022 07:48  Phos  3.4     01-03  Mg     2.1     01-04      JOEY CHRISTOPHER  CARDIAC MARKERS ( 2022 16:26 )  x     / 0.08 ng/mL / x     / x     / x      CARDIAC MARKERS ( 2022 10:49 )  x     / 0.11 ng/mL / x     / x     / x            Urinalysis Basic - ( 2022 10:05 )    Color: Yellow / Appearance: Clear / S.015 / pH: x  Gluc: x / Ketone: Negative  / Bili: Negative / Urobili: Negative mg/dL   Blood: x / Protein: Negative / Nitrite: Negative   Leuk Esterase: Negative / RBC: x / WBC x   Sq Epi: x / Non Sq Epi: x / Bacteria: x        RADIOLOGY & ADDITIONAL STUDIES:    INTERPRETATION OF TELEMETRY (personally reviewed):      < from: Cardiac Cath Lab - Adult (19 @ 18:08) >  VENTRICLES: LVEF 40%  CORONARY VESSELS: The coronary circulation is right dominant.  LM:   --  LM: Normal.  LAD:   --  LAD: Angiography showed minor luminal irregularities with no  flow limiting lesions.  CX:   --  Proximal circumflex: There was a 100 % stenosis.  RCA:   --  Mid RCA: There was a 100 % stenosis. There was poor collateral  blood supply to the distal myocardium.  COMPLICATIONS: There were no complications.  DIAGNOSTIC IMPRESSIONS: Acute occlusion of proximal LCX and  of RCA with  left to right collaterals. The LCX was treated with  thrombectomy/PTCA/STENT (OSMAR-Resolute) with good result.  DIAGNOSTIC RECOMMENDATIONS: ASA and Brilinta  INTERVENTIONAL IMPRESSIONS: Acute occlusion of proximal LCX and  of RCA  with left to right collaterals. The LCX was treated with  thrombectomy/PTCA/STENT (OSMAR-Resolute) with good result.  INTERVENTIONAL RECOMMENDATIONS: ASA and Brmichata  Prepared and signed by  Hipolito Avila MD    < end of copied text >      < from: CT Chest No Cont (22 @ 09:25) >  IMPRESSION:    Mild pulmonary edema, small pleural effusions, and associated passive   atelectasis of the lower lobes.    Stable middle lobe 1.5 cm nodule, unchanged dating back to ,   compatible with a benign entity such as a granuloma or hamartoma.    < end of copied text >    ECHOCARDIOGRAM independently visualized/reviewed and demonstrated :   Summary:   1. Endocardium opacified with Definity echocontrast.   2. Left ventricular ejection fraction, by visual estimation, is 25 to   30%.   3. Severely decreased segmental left ventricular systolic function.   4. Multiple left ventricular regional wall motion abnormalities exist   significant for apical wall akinesis and severe inferolateral wall   hypokinesis.   5. There is mild eccentric left ventricular hypertrophy.   6. The mitral in-flow pattern reveals no discernable A-wave, therefore   no comment on diastolic function can be made.   7. Normal RV size with mildly reduced RV systolic function, estimated   PASP 27 mmHg.   8. Severely enlarged left atrium.   9. Mild mitral annular calcification.  10. Moderate to severe mitral valve regurgitation.  11. Moderately decreased posterior mitral leaflet mobility.  12. Sclerotic aortic valve with normal opening.  13. Trivial pericardial effusion.  14. Moderate pleural effusion in the left lateral region.  15. Compared to the prior TTE study from 19, LV systolic function   remains severely reduced.    76y Female with past medical history significant for HTN HLD HFrEF EF ~ 35 to 40% moderate to severe MR CAD/MI ICM lasted PCI 2019 to OSMAR to LCx  of RCA left to right collaterals mild LAD/LM Lung cancer baseline O2 therapy with METS COPD ex smoker who presented with dyspnea VILLARREAL orthopnea without PND, weight gain or chest pain.    CT of chest WO showed pulmonary edema small pleural effusion with passive atelectasis of the lower lobes; Acute on chronic HFrEF     Troponin elevation possibly secondary HF and severe CMP  Continue ASA and Brilinta  Continue lasix po 40 mg BID  Continue Toprol and Entresto  Pt needs cardiac cath given persistent CMP prior to consideration for AICD. However, pt does not want to stay in the hospital for work-up; Patient understands all the risk vs benefits       Patient agree for lifevest therapy called     DC planning after Lifevest   
[facility] Division of Hospital Medicine    Chief Complaint:  SOB    SUBJECTIVE: reports feeling better, very upset about provided care (" it is very busy here)    OVERNIGHT EVENTS: none reported    Patient denies chest pain, abd pain, N/V, fever, chills, dysuria or any other complaints. All remainder ROS negative.     MEDICATIONS  (STANDING):  albuterol/ipratropium for Nebulization 3 milliLiter(s) Nebulizer every 6 hours  aspirin enteric coated 81 milliGRAM(s) Oral daily  atorvastatin 40 milliGRAM(s) Oral at bedtime  cyanocobalamin 1000 MICROGram(s) Oral daily  levothyroxine 200 MICROGram(s) Oral daily  metoprolol succinate ER 12.5 milliGRAM(s) Oral daily  multivitamin 1 Tablet(s) Oral daily  sacubitril 24 mG/valsartan 26 mG 1 Tablet(s) Oral two times a day  ticagrelor 90 milliGRAM(s) Oral every 12 hours  tiotropium 18 MICROgram(s) Capsule 1 Capsule(s) Inhalation daily    MEDICATIONS  (PRN):  acetaminophen     Tablet .. 650 milliGRAM(s) Oral every 6 hours PRN Temp greater or equal to 38C (100.4F), Mild Pain (1 - 3)  traMADol 50 milliGRAM(s) Oral every 8 hours PRN Severe Pain (7 - 10)        I&O's Summary      PHYSICAL EXAM:  Vital Signs Last 24 Hrs  T(C): 36.7 (2022 15:34), Max: 36.7 (2022 15:34)  T(F): 98 (2022 15:34), Max: 98 (2022 15:34)  HR: 60 (2022 15:34) (49 - 60)  BP: 116/52 (2022 15:34) (105/55 - 122/68)  BP(mean): --  RR: 18 (2022 15:34) (18 - 18)  SpO2: 98% (2022 15:34) (97% - 100%)        CONSTITUTIONAL: NAD, well-developed, well-groomed  ENMT: Moist oral mucosa, no pharyngeal injection or exudates; normal dentition; No JVD  RESPIRATORY: Normal respiratory effort; lungs are clear to auscultation bilaterally  CARDIOVASCULAR: Regular rate and rhythm, distal normal S1 and S2, systolic murmur at apex, trace extremity edema; Peripheral pulses are 2+ bilaterally  ABDOMEN: Nontender to palpation, normoactive bowel sounds, no rebound/guarding; No hepatosplenomegaly  MUSCLOSKELETAL:  no clubbing or cyanosis of digits; no joint swelling or tenderness to palpation  PSYCH: A+O to person, place, and time; affect appropriate  NEUROLOGY: CN 2-12 are intact and symmetric; no gross sensory deficits; was observed moving all 4 ext against gravity cooperating with exam.   SKIN: No rashes; no palpable lesions    LABS:                        10.0   7.06  )-----------( 328      ( 2022 03:19 )             33.5     01-04    138  |  103  |  24.7<H>  ----------------------------<  85  3.8   |  21.0<L>  |  1.46<H>    Ca    10.0      2022 03:19  Phos  3.4     01-03  Mg     2.1     01-04    TPro  6.9  /  Alb  3.9  /  TBili  0.8  /  DBili  x   /  AST  16  /  ALT  11  /  AlkPhos  105  01-03      CARDIAC MARKERS ( 2022 16:26 )  x     / 0.08 ng/mL / x     / x     / x      CARDIAC MARKERS ( 2022 10:49 )  x     / 0.11 ng/mL / x     / x     / x      CARDIAC MARKERS ( 2022 04:41 )  x     / <0.01 ng/mL / x     / x     / x          Urinalysis Basic - ( 2022 10:05 )    Color: Yellow / Appearance: Clear / S.015 / pH: x  Gluc: x / Ketone: Negative  / Bili: Negative / Urobili: Negative mg/dL   Blood: x / Protein: Negative / Nitrite: Negative   Leuk Esterase: Negative / RBC: x / WBC x   Sq Epi: x / Non Sq Epi: x / Bacteria: x        Culture - Urine (collected 2022 16:38)  Source: Clean Catch Clean Catch (Midstream)  Preliminary Report (2022 11:24):    50,000 - 99,000 CFU/mL Escherichia coli      CAPILLARY BLOOD GLUCOSE            RADIOLOGY & ADDITIONAL TESTS:  Results Reviewed:   Imaging Personally Reviewed:  Electrocardiogram Personally Reviewed:

## 2022-01-06 PROBLEM — E78.5 HYPERLIPIDEMIA, UNSPECIFIED: Chronic | Status: ACTIVE | Noted: 2022-01-03

## 2022-01-06 PROBLEM — I50.22 CHRONIC SYSTOLIC (CONGESTIVE) HEART FAILURE: Chronic | Status: ACTIVE | Noted: 2022-01-03

## 2022-01-11 ENCOUNTER — APPOINTMENT (OUTPATIENT)
Dept: CARE COORDINATION | Facility: HOME HEALTH | Age: 77
End: 2022-01-11
Payer: MEDICARE

## 2022-01-11 VITALS
BODY MASS INDEX: 27.96 KG/M2 | OXYGEN SATURATION: 96 % | WEIGHT: 148 LBS | TEMPERATURE: 97.4 F | HEART RATE: 57 BPM | SYSTOLIC BLOOD PRESSURE: 103 MMHG | DIASTOLIC BLOOD PRESSURE: 50 MMHG | RESPIRATION RATE: 15 BRPM

## 2022-01-11 DIAGNOSIS — J44.9 CHRONIC OBSTRUCTIVE PULMONARY DISEASE, UNSPECIFIED: ICD-10-CM

## 2022-01-11 DIAGNOSIS — N18.30 CHRONIC KIDNEY DISEASE, STAGE 3 UNSPECIFIED: ICD-10-CM

## 2022-01-11 DIAGNOSIS — I25.10 ATHEROSCLEROTIC HEART DISEASE OF NATIVE CORONARY ARTERY W/OUT ANGINA PECTORIS: ICD-10-CM

## 2022-01-11 DIAGNOSIS — C34.90 MALIGNANT NEOPLASM OF UNSPECIFIED PART OF UNSPECIFIED BRONCHUS OR LUNG: ICD-10-CM

## 2022-01-11 DIAGNOSIS — I50.9 HEART FAILURE, UNSPECIFIED: ICD-10-CM

## 2022-01-11 DIAGNOSIS — C79.51 SECONDARY MALIGNANT NEOPLASM OF BONE: ICD-10-CM

## 2022-01-11 PROCEDURE — 99496 TRANSJ CARE MGMT HIGH F2F 7D: CPT

## 2022-01-11 RX ORDER — METOPROLOL SUCCINATE 25 MG/1
25 TABLET, EXTENDED RELEASE ORAL
Qty: 15 | Refills: 0 | Status: ACTIVE | COMMUNITY
Start: 2022-01-05

## 2022-01-11 RX ORDER — MIRTAZAPINE 15 MG/1
15 TABLET, FILM COATED ORAL
Qty: 15 | Refills: 0 | Status: ACTIVE | COMMUNITY
Start: 2022-01-05

## 2022-01-11 RX ORDER — IPRATROPIUM BROMIDE AND ALBUTEROL SULFATE 2.5; .5 MG/3ML; MG/3ML
0.5-2.5 (3) SOLUTION RESPIRATORY (INHALATION) 4 TIMES DAILY
Qty: 1 | Refills: 0 | Status: ACTIVE | COMMUNITY
Start: 2022-01-11

## 2022-01-17 PROCEDURE — 71045 X-RAY EXAM CHEST 1 VIEW: CPT

## 2022-01-17 PROCEDURE — 87086 URINE CULTURE/COLONY COUNT: CPT

## 2022-01-17 PROCEDURE — 82330 ASSAY OF CALCIUM: CPT

## 2022-01-17 PROCEDURE — 80053 COMPREHEN METABOLIC PANEL: CPT

## 2022-01-17 PROCEDURE — 81003 URINALYSIS AUTO W/O SCOPE: CPT

## 2022-01-17 PROCEDURE — 36415 COLL VENOUS BLD VENIPUNCTURE: CPT

## 2022-01-17 PROCEDURE — C8929: CPT

## 2022-01-17 PROCEDURE — 82306 VITAMIN D 25 HYDROXY: CPT

## 2022-01-17 PROCEDURE — 83735 ASSAY OF MAGNESIUM: CPT

## 2022-01-17 PROCEDURE — 83970 ASSAY OF PARATHORMONE: CPT

## 2022-01-17 PROCEDURE — 96374 THER/PROPH/DIAG INJ IV PUSH: CPT

## 2022-01-17 PROCEDURE — 85025 COMPLETE CBC W/AUTO DIFF WBC: CPT

## 2022-01-17 PROCEDURE — 83550 IRON BINDING TEST: CPT

## 2022-01-17 PROCEDURE — 85027 COMPLETE CBC AUTOMATED: CPT

## 2022-01-17 PROCEDURE — 94640 AIRWAY INHALATION TREATMENT: CPT

## 2022-01-17 PROCEDURE — 93005 ELECTROCARDIOGRAM TRACING: CPT

## 2022-01-17 PROCEDURE — 83880 ASSAY OF NATRIURETIC PEPTIDE: CPT

## 2022-01-17 PROCEDURE — 82803 BLOOD GASES ANY COMBINATION: CPT

## 2022-01-17 PROCEDURE — 94660 CPAP INITIATION&MGMT: CPT

## 2022-01-17 PROCEDURE — 87186 SC STD MICRODIL/AGAR DIL: CPT

## 2022-01-17 PROCEDURE — 85014 HEMATOCRIT: CPT

## 2022-01-17 PROCEDURE — 96375 TX/PRO/DX INJ NEW DRUG ADDON: CPT

## 2022-01-17 PROCEDURE — 83540 ASSAY OF IRON: CPT

## 2022-01-17 PROCEDURE — 99291 CRITICAL CARE FIRST HOUR: CPT

## 2022-01-17 PROCEDURE — 84100 ASSAY OF PHOSPHORUS: CPT

## 2022-01-17 PROCEDURE — 71250 CT THORAX DX C-: CPT

## 2022-01-17 PROCEDURE — 84295 ASSAY OF SERUM SODIUM: CPT

## 2022-01-17 PROCEDURE — 82435 ASSAY OF BLOOD CHLORIDE: CPT

## 2022-01-17 PROCEDURE — 82728 ASSAY OF FERRITIN: CPT

## 2022-01-17 PROCEDURE — 84466 ASSAY OF TRANSFERRIN: CPT

## 2022-01-17 PROCEDURE — 85018 HEMOGLOBIN: CPT

## 2022-01-17 PROCEDURE — 80048 BASIC METABOLIC PNL TOTAL CA: CPT

## 2022-01-17 PROCEDURE — 82947 ASSAY GLUCOSE BLOOD QUANT: CPT

## 2022-01-17 PROCEDURE — 82310 ASSAY OF CALCIUM: CPT

## 2022-01-17 PROCEDURE — 0225U NFCT DS DNA&RNA 21 SARSCOV2: CPT

## 2022-01-17 PROCEDURE — 84484 ASSAY OF TROPONIN QUANT: CPT

## 2022-01-17 PROCEDURE — 83605 ASSAY OF LACTIC ACID: CPT

## 2022-01-17 PROCEDURE — 84132 ASSAY OF SERUM POTASSIUM: CPT

## 2022-01-22 PROBLEM — I25.10 CORONARY ARTERY DISEASE: Status: ACTIVE | Noted: 2019-12-11

## 2022-01-22 PROBLEM — J44.9 COPD (CHRONIC OBSTRUCTIVE PULMONARY DISEASE): Status: ACTIVE | Noted: 2019-12-11

## 2022-01-22 PROBLEM — C34.90 LUNG CANCER: Status: ACTIVE | Noted: 2019-12-11

## 2022-01-22 PROBLEM — I50.9 CHF (CONGESTIVE HEART FAILURE): Status: ACTIVE | Noted: 2019-12-11

## 2022-01-22 PROBLEM — N18.30 CKD (CHRONIC KIDNEY DISEASE), STAGE III: Status: ACTIVE | Noted: 2021-04-28

## 2022-01-22 NOTE — PHYSICAL EXAM
[No Acute Distress] : no acute distress [Well Nourished] : well nourished [Well Developed] : well developed [Well-Appearing] : well-appearing [Normal Sclera/Conjunctiva] : normal sclera/conjunctiva [PERRL] : pupils equal round and reactive to light [EOMI] : extraocular movements intact [Normal Outer Ear/Nose] : the outer ears and nose were normal in appearance [Normal Oropharynx] : the oropharynx was normal [No JVD] : no jugular venous distention [Supple] : supple [No Lymphadenopathy] : no lymphadenopathy [Thyroid Normal, No Nodules] : the thyroid was normal and there were no nodules present [No Respiratory Distress] : no respiratory distress  [Clear to Auscultation] : lungs were clear to auscultation bilaterally [No Accessory Muscle Use] : no accessory muscle use [Normal Rate] : normal rate  [Regular Rhythm] : with a regular rhythm [Normal S1, S2] : normal S1 and S2 [No Murmur] : no murmur heard [No Carotid Bruits] : no carotid bruits [No Abdominal Bruit] : a ~M bruit was not heard ~T in the abdomen [No Varicosities] : no varicosities [Pedal Pulses Present] : the pedal pulses are present [No Edema] : there was no peripheral edema [No Extremity Clubbing/Cyanosis] : no extremity clubbing/cyanosis [No Palpable Aorta] : no palpable aorta [Soft] : abdomen soft [Non Tender] : non-tender [Non-distended] : non-distended [No Masses] : no abdominal mass palpated [Normal Bowel Sounds] : normal bowel sounds [Normal Anterior Cervical Nodes] : no anterior cervical lymphadenopathy [No CVA Tenderness] : no CVA  tenderness [No Spinal Tenderness] : no spinal tenderness [No Joint Swelling] : no joint swelling [Grossly Normal Strength/Tone] : grossly normal strength/tone [No Rash] : no rash [Normal Gait] : normal gait [Coordination Grossly Intact] : coordination grossly intact [No Focal Deficits] : no focal deficits [Deep Tendon Reflexes (DTR)] : deep tendon reflexes were 2+ and symmetric [Speech Grossly Normal] : speech grossly normal [Memory Grossly Normal] : memory grossly normal [Normal Affect] : the affect was normal [Alert and Oriented x3] : oriented to person, place, and time [Normal Mood] : the mood was normal [Normal Insight/Judgement] : insight and judgment were intact

## 2022-01-22 NOTE — REVIEW OF SYSTEMS
[Fever] : no fever [Chills] : no chills [Fatigue] : fatigue [Chest Pain] : no chest pain [Palpitations] : no palpitations [Leg Claudication] : no leg claudication [Lower Ext Edema] : no lower extremity edema [Orthopnea] : no orthopnea [Paroxysmal Nocturnal Dyspnea] : no paroxysmal nocturnal dyspnea [Shortness Of Breath] : no shortness of breath [Wheezing] : no wheezing [Cough] : no cough [Dyspnea on Exertion] : dyspnea on exertion [Abdominal Pain] : no abdominal pain [Nausea] : no nausea [Constipation] : no constipation [Diarrhea] : diarrhea [Vomiting] : no vomiting [Heartburn] : no heartburn [Melena] : no melena [Dysuria] : no dysuria [Incontinence] : no incontinence [Nocturia] : no nocturia [Poor Libido] : libido not poor [Hematuria] : no hematuria [Frequency] : no frequency [Joint Pain] : no joint pain [Joint Stiffness] : no joint stiffness [Joint Swelling] : no joint swelling [Muscle Weakness] : no muscle weakness [Muscle Pain] : no muscle pain [Back Pain] : back pain [Headache] : no headache [Dizziness] : no dizziness [Fainting] : no fainting [Confusion] : no confusion [Memory Loss] : no memory loss [Unsteady Walking] : no ataxia [Insomnia] : no insomnia [Anxiety] : no anxiety [Depression] : no depression [Negative] : Heme/Lymph

## 2022-01-22 NOTE — HEALTH RISK ASSESSMENT
[Former] : Former [No falls in past year] : Patient reported no falls in the past year [0] : 2) Feeling down, depressed, or hopeless: Not at all (0) [PHQ-2 Negative - No further assessment needed] : PHQ-2 Negative - No further assessment needed [[id number]] : 0 [None] : None [Reports changes in hearing] : Reports no changes in hearing [Reports changes in vision] : Reports no changes in vision [de-identified] : assistance required [de-identified] : assistance required [With Patient/Caregiver] : , with patient/caregiver [Reviewed no changes] : Reviewed, no changes [Designated Healthcare Proxy] : Designated healthcare proxy [Name: ___] : Health Care Proxy's Name: [unfilled]  [Relationship: ___] : Relationship: [unfilled] [Aggressive treatment] : aggressive treatment [I will adhere to the patient's wishes.] : I will adhere to the patient's wishes. [Time Spent: ___ minutes] : Time Spent: [unfilled] minutes [AdvancecareDate] : 1/11/2022 [FreeTextEntry4] : Pt and spouse has fair understanding of complexity of current health conditions.\par A lengthy discussion held regarding disease trajectory of CHF, COPD, CKD and metastatic cancer.\par All questions answered\par Pt once wanted to have DNR/DNI, but as of now, Pt wants to purse all possible treatment measures. \par

## 2022-01-22 NOTE — ASSESSMENT
[FreeTextEntry1] : [] CHF with severe cardiomyopathy\par - euvolemic on exam\par - TTE (1/3/22): EF 25-30%, severe inferolateral wall hypokinesiss, multiple LV wall motion abnormalities. \par - LifeVest compliance reinforced. \par - continue furosemide 40mg BID, Entresto 24mg-26mg bID, metoprolol succinate 12.5mgQD\par - low sodium diet, 1L fluid restriction, daily weight and BP monitor\par - Follow up with Dr. Hernandez for outpatient cardiac cath and AICD placement\par \par [] CAD\par - s/p PCI in 2019\par - continue ASA, ticagrelor 90mg BID, Lipitor \par \par [] COPD\par - stable on exam, \par - continue home O2 2-3L/m\par - continue duoneb\par \par [] CKD\par - Cr 1.22/ GFR 44\par - avoid nephrotoxic agents\par - adequate hydration\par - follow up with Dr. Oviedo\par \par [] metastatic lung cancer\par - lung cancer mets to stomach and Rt hip\par - chronic pain with Tylenol and Tramadol\par - Followed by [facility]\par \par \par Reviewed all medications at length with patient, All questions addressed. Worsening symptoms discussed with pt understanding. No issues or concerns at this time. Pt encouraged to call [facility] at [phone] w/any questions, concerns or issues. Will continue to follow up with patient status\par \par

## 2022-02-04 RX ORDER — CHLORHEXIDINE GLUCONATE 213 G/1000ML
1 SOLUTION TOPICAL ONCE
Refills: 0 | Status: DISCONTINUED | OUTPATIENT
Start: 2022-02-07 | End: 2022-02-21

## 2022-02-04 NOTE — H&P PST ADULT - ASSESSMENT
Patient for [facility] secondary to newly diagnosed reduced EF and acute on chronic CHF episode.     Plan: PRE-PROCEDURE ASSESSMENT    -Patient seen and examined  PRE-PROCEDURE ASSESSMENT  -NPO after midnight confirmed  -IV insert  -Patient seen and examined  -Labs reviewed  -Pre-procedure teaching completed with patient   -consent obtained   -Questions answered   Impression/Plan: 77yo female with prior NSTEMI, mRCA known  and OSMAR to pLCx thrombotic lesion 2/2019, ICM with recent adm 1/3/22 with ADHF, BNP 4169, tnl leak to 0.11, no acute ischemic change on ECG, TTE with mod-sev MR, LVEF of 25-30%, refused repeat LHC at that time, medical management optimized and life vest initiated upon discharge, now returns for scheduled LHC to be performed by Dr. Avila.       -plan for LHC via Ra vs FA  -patient seen and examined  -confirmed appropriate NPO duration  -ECG and Labs reviewed  -Aspirin 81mg and Brilinta 90mg pre-cath  -procedure discussed with patient; risks and benefits explained, questions answered  -consent obtained by attending IC

## 2022-02-04 NOTE — H&P PST ADULT - HISTORY OF PRESENT ILLNESS
Narrative: This ia a 75 yo female with a PMHx of HTN, Hld, hypothyroidism, metastatic lung CA s/p treatment and currently in remission, coronary artery disease s/p PCI to the  of the LCX with good result 2/8/2019, who was recently hospitalized (1/3/22) at [facility] for SOB and was found wtihan EF of 25-30%.  A LHC was recommended to the patient at that hospitalization, however she refused.  She was discharged to home with a lifevest.     ASA  Mallampati  GFR  Creat  Bleeding Risk  COVID19 -     Symptoms:        Angina (Class): 3       Ischemic Symptoms: SOB    Heart Failure:        Systolic/Diastolic/Combined: Acute systolic HF       NYHA Class (within 2 weeks):     Assessment of LVEF:       EF: 25-30%       Assessed by: TTE        Date: 1/3/2022    Prior Cardiac Interventions:       PCI's: 2/8/2020: Acute occlusion of pLCX and  of RCA with the left to right collaterals.  The LCX was treated with thrombectomy/PTCA/STENT (OSMAR-Resolute) with good results.      Noninvasive Testing:   MUGA scan 3/9/2020: Abnormal MUGA study, the LV is mildly dilated with mild global hypokinesis, the EF was 42%    Echo: 1/3/22:  EF 25-30%, mild LVH, severely enlarged left atrium, mild mitral annular calcification, moderate to severe MR, sclerotic aortic valve, trivial pericardial effusion, moderate pleural effusion.    Antianginal Therapies:        Beta Blockers:  Metoprolol 25 mg daily       Calcium Channel Blockers:        Long Acting Nitrates:        Ranexa:     Associated Risk Factors:        Cerebrovascular Disease: N/A       Chronic Lung Disease: N/A       Peripheral Arterial Disease: N/A       Chronic Kidney Disease (if yes, what is GFR): N/A       Uncontrolled Diabetes (if yes, what is HgbA1C or FBS): N/A       Poorly Controlled Hypertension (if yes, what is SBP): N/A       Morbid Obesity (if yes, what is BMI): N/A       History of Recent Ventricular Arrhythmia: N/A       Inability to Ambulate Safely: N/A       Need for Therapeutic Anticoagulation: N/A       Antiplatelet or Contrast Allergy: N/A Narrative: This ia a 77 yo female with a PMHx of HTN, Hld, hypothyroidism, COPD, metastatic lung CA s/p treatment and currently in remission, ICM with LVEF range 25-35% since 2019, coronary artery disease s/p PCI to LCX  2/8/2019, recently hospitalized (1/3/22) at [facility] with ADHF, TTE with mod-sev MR, LVEF of 25-30%, refused repeat LHC at that time, medical management optimized and life vest initiated upon discharge, now returns for scheduled LHC to be performed by Dr. Avila.     ASA 3  Mallampati 2  GFR  Creat  Bleeding Risk  COVID19 -     Symptoms:        Angina (Class): 3       Ischemic Symptoms: SOB    Heart Failure:        Systolic/Diastolic/Combined: chronic systolic HF       NYHA Class (within 2 weeks): III    Assessment of LVEF:       EF: 25-30%       Assessed by: TTE        Date: 1/3/2022    Prior Cardiac Interventions:       PCI's: 2/8/2019: Acute occlusion of pLCX and  of RCA with the left to right collaterals.  The LCX was treated with thrombectomy/PTCA/STENT (OSMAR-Resolute) with good results.      Noninvasive Testing:   MUGA scan 3/9/2020: Abnormal MUGA study, the LV is mildly dilated with mild global hypokinesis, the EF was 42%    Echo: 1/3/22:  EF 25-30%, mild LVH, severely enlarged left atrium, mild mitral annular calcification, moderate to severe MR, sclerotic aortic valve, trivial pericardial effusion, moderate pleural effusion.    Antianginal Therapies:        Beta Blockers:  Metoprolol 25 mg daily       Calcium Channel Blockers:        Long Acting Nitrates:        Ranexa:     Associated Risk Factors:        Cerebrovascular Disease: N/A       Chronic Lung Disease: N/A       Peripheral Arterial Disease: N/A       Chronic Kidney Disease (if yes, what is GFR): N/A       Uncontrolled Diabetes (if yes, what is HgbA1C or FBS): N/A       Poorly Controlled Hypertension (if yes, what is SBP): N/A       Morbid Obesity (if yes, what is BMI): N/A       History of Recent Ventricular Arrhythmia: N/A       Inability to Ambulate Safely: N/A       Need for Therapeutic Anticoagulation: N/A       Antiplatelet or Contrast Allergy: N/A Narrative: This ia a 75 yo female with a PMHx of HTN, HLD, DM, hypothyroidism, ex-smoker, COPD, lung and stomach cancer treated with chemo and hip cancer treated with XRT, remission of cancers X 2years, ICM with LVEF range 25-35% since 2019, CAD/NSTEMI 2/2019 s/p LHC with thrombotic 100% culprit lesion pLCx and mRCA , underwent OSMAR to pLCX with PETRONA 3 flow post, CKD 3, recently hospitalized (1/3/22) at [facility] with ADHF, BNP 4169, tnl leak to 0.11, no acute ischemic change on ECG, TTE with mod-sev MR, LVEF of 25-30%, refused repeat LHC at that time, medical management optimized and life vest initiated upon discharge, now returns for scheduled LHC to be performed by Dr. Avila.     ASA 3  Mallampati 2  GFR 40  Creat 1.3  Bleeding Risk 8.8%    Symptoms:        Angina (Class): 3       Ischemic Symptoms: SOB    Heart Failure:        Systolic/Diastolic/Combined: chronic systolic HF       NYHA Class (within 2 weeks): III    Assessment of LVEF:       EF: 25-30%       Assessed by: TTE        Date: 1/3/2022    Prior Cardiac Interventions:       PCI: 2/8/2019: Acute thrombotic occlusion of pLCX and  of RCA with the left to right collaterals.  The LCX was treated with thrombectomy/PTCA/OSMAR ddii 3 X 38mm with good results.    VENTRICLES: LVEF 40%  CORONARY VESSELS: The coronary circulation is right dominant.  LM:   --  LM: Normal.  LAD:   --  LAD: Angiography showed minor luminal irregularities with no  flow limiting lesions.  CX:   --  Proximal circumflex: There was a 100 % stenosis.  RCA:   --  Mid RCA: There was a 100 % stenosis. There was poor collateral  blood supply to the distal myocardium.      Noninvasive Testing:   MUGA scan 3/9/2020: Abnormal MUGA study, the LV is mildly dilated with mild global hypokinesis, the EF was 42%    Echo: 1/3/22:  EF 25-30%, mild LVH, severely enlarged left atrium, mild mitral annular calcification, moderate to severe MR, sclerotic aortic valve, trivial pericardial effusion, moderate pleural effusion.    Antianginal Therapies:        Beta Blockers:  Metoprolol 25 mg daily       Calcium Channel Blockers:        Long Acting Nitrates:        Ranexa:     Associated Risk Factors:        Cerebrovascular Disease: N/A       Chronic Lung Disease: N/A       Peripheral Arterial Disease: N/A       Chronic Kidney Disease (if yes, what is GFR): yes, CKD 3 with GFR 40 (prior EDER resolved with med adjustments)       Uncontrolled Diabetes (if yes, what is HgbA1C or FBS): N/A       Poorly Controlled Hypertension (if yes, what is SBP): N/A       Morbid Obesity (if yes, what is BMI): N/A       History of Recent Ventricular Arrhythmia: N/A       Inability to Ambulate Safely: N/A       Need for Therapeutic Anticoagulation: N/A       Antiplatelet or Contrast Allergy: N/A    ROS: as stated above, otherwise negative    PHYSICAL EXAM:  Constitutional: A & O x 3, NAD  HEENT:  Normal oral mucosa, PERRL, EOMI	  Cardiovascular: S1 S2, III/VI systolic murmur, No JVD  Respiratory: Lungs clear to auscultation	  Gastrointestinal:  Soft, Non-tender, + BS	  Skin: No rashes or cyanosis  Neurologic: No deficit appreciated  Extremities: Normal range of motion, no edema  Vascular: distal pulses +    Narrative: This ia a 75 yo female with a PMHx of HTN, HLD, hypothyroidism, ex-smoker, COPD, lung and stomach cancer treated with chemo and hip cancer treated with XRT, remission of cancers X 2years, ICM with LVEF range 25-35% since 2019, CAD/NSTEMI 2/2019 s/p LHC with thrombotic 100% culprit lesion pLCx and mRCA , underwent OSMAR to pLCX with PETRONA 3 flow post, CKD 3, recently hospitalized (1/3/22) at [facility] with ADHF, BNP 4169, tnl leak to 0.11, no acute ischemic change on ECG, TTE with mod-sev MR, LVEF of 25-30%, refused repeat LHC at that time, medical management optimized and life vest initiated upon discharge, now returns for scheduled LHC to be performed by Dr. Avila.     ASA 3  Mallampati 2  GFR 40  Creat 1.3  Bleeding Risk 8.8%    Symptoms:        Angina (Class): 3       Ischemic Symptoms: SOB    Heart Failure:        Systolic/Diastolic/Combined: chronic systolic HF       NYHA Class (within 2 weeks): III    Assessment of LVEF:       EF: 25-30%       Assessed by: TTE        Date: 1/3/2022    Prior Cardiac Interventions:       PCI: 2/8/2019: Acute thrombotic occlusion of pLCX and  of RCA with the left to right collaterals.  The LCX was treated with thrombectomy/PTCA/OSMAR didi 3 X 38mm with good results.    VENTRICLES: LVEF 40%  CORONARY VESSELS: The coronary circulation is right dominant.  LM:   --  LM: Normal.  LAD:   --  LAD: Angiography showed minor luminal irregularities with no  flow limiting lesions.  CX:   --  Proximal circumflex: There was a 100 % stenosis.  RCA:   --  Mid RCA: There was a 100 % stenosis. There was poor collateral  blood supply to the distal myocardium.      Noninvasive Testing:   MUGA scan 3/9/2020: Abnormal MUGA study, the LV is mildly dilated with mild global hypokinesis, the EF was 42%    Echo: 1/3/22:  EF 25-30%, mild LVH, severely enlarged left atrium, mild mitral annular calcification, moderate to severe MR, sclerotic aortic valve, trivial pericardial effusion, moderate pleural effusion.    Antianginal Therapies:        Beta Blockers:  Metoprolol 25 mg daily       Calcium Channel Blockers:        Long Acting Nitrates:        Ranexa:     Associated Risk Factors:        Cerebrovascular Disease: N/A       Chronic Lung Disease: N/A       Peripheral Arterial Disease: N/A       Chronic Kidney Disease (if yes, what is GFR): yes, CKD 3 with GFR 40 (prior EDER resolved with med adjustments)       Uncontrolled Diabetes (if yes, what is HgbA1C or FBS): N/A       Poorly Controlled Hypertension (if yes, what is SBP): N/A       Morbid Obesity (if yes, what is BMI): N/A       History of Recent Ventricular Arrhythmia: N/A       Inability to Ambulate Safely: N/A       Need for Therapeutic Anticoagulation: N/A       Antiplatelet or Contrast Allergy: N/A    ROS: as stated above, otherwise negative    PHYSICAL EXAM:  Constitutional: A & O x 3, NAD  HEENT:  Normal oral mucosa, PERRL, EOMI	  Cardiovascular: S1 S2, III/VI systolic murmur, No JVD  Respiratory: Lungs clear to auscultation	  Gastrointestinal:  Soft, Non-tender, + BS	  Skin: No rashes or cyanosis  Neurologic: No deficit appreciated  Extremities: Normal range of motion, no edema  Vascular: distal pulses +

## 2022-02-07 ENCOUNTER — TRANSCRIPTION ENCOUNTER (OUTPATIENT)
Age: 77
End: 2022-02-07

## 2022-02-07 ENCOUNTER — OUTPATIENT (OUTPATIENT)
Dept: OUTPATIENT SERVICES | Facility: HOSPITAL | Age: 77
LOS: 1 days | Discharge: HOME | End: 2022-02-07
Payer: MEDICARE

## 2022-02-07 VITALS
OXYGEN SATURATION: 97 % | TEMPERATURE: 98 F | SYSTOLIC BLOOD PRESSURE: 123 MMHG | HEIGHT: 67 IN | WEIGHT: 137.13 LBS | HEART RATE: 56 BPM | RESPIRATION RATE: 18 BRPM | DIASTOLIC BLOOD PRESSURE: 51 MMHG

## 2022-02-07 VITALS
HEART RATE: 68 BPM | OXYGEN SATURATION: 95 % | SYSTOLIC BLOOD PRESSURE: 107 MMHG | DIASTOLIC BLOOD PRESSURE: 53 MMHG | RESPIRATION RATE: 20 BRPM

## 2022-02-07 DIAGNOSIS — R06.02 SHORTNESS OF BREATH: ICD-10-CM

## 2022-02-07 DIAGNOSIS — Z98.890 OTHER SPECIFIED POSTPROCEDURAL STATES: Chronic | ICD-10-CM

## 2022-02-07 LAB
A1C WITH ESTIMATED AVERAGE GLUCOSE RESULT: 5.7 % — HIGH (ref 4–5.6)
ALBUMIN SERPL ELPH-MCNC: 3.7 G/DL — SIGNIFICANT CHANGE UP (ref 3.3–5.2)
ALP SERPL-CCNC: 89 U/L — SIGNIFICANT CHANGE UP (ref 40–120)
ALT FLD-CCNC: 28 U/L — SIGNIFICANT CHANGE UP
ANION GAP SERPL CALC-SCNC: 13 MMOL/L — SIGNIFICANT CHANGE UP (ref 5–17)
AST SERPL-CCNC: 19 U/L — SIGNIFICANT CHANGE UP
BASOPHILS # BLD AUTO: 0.04 K/UL — SIGNIFICANT CHANGE UP (ref 0–0.2)
BASOPHILS NFR BLD AUTO: 0.6 % — SIGNIFICANT CHANGE UP (ref 0–2)
BILIRUB SERPL-MCNC: 0.6 MG/DL — SIGNIFICANT CHANGE UP (ref 0.4–2)
BUN SERPL-MCNC: 22.3 MG/DL — HIGH (ref 8–20)
CALCIUM SERPL-MCNC: 11.1 MG/DL — HIGH (ref 8.6–10.2)
CHLORIDE SERPL-SCNC: 106 MMOL/L — SIGNIFICANT CHANGE UP (ref 98–107)
CO2 SERPL-SCNC: 22 MMOL/L — SIGNIFICANT CHANGE UP (ref 22–29)
CREAT SERPL-MCNC: 1.32 MG/DL — HIGH (ref 0.5–1.3)
EOSINOPHIL # BLD AUTO: 0.28 K/UL — SIGNIFICANT CHANGE UP (ref 0–0.5)
EOSINOPHIL NFR BLD AUTO: 3.9 % — SIGNIFICANT CHANGE UP (ref 0–6)
ESTIMATED AVERAGE GLUCOSE: 117 MG/DL — HIGH (ref 68–114)
GLUCOSE SERPL-MCNC: 97 MG/DL — SIGNIFICANT CHANGE UP (ref 70–99)
HCT VFR BLD CALC: 34.6 % — SIGNIFICANT CHANGE UP (ref 34.5–45)
HGB BLD-MCNC: 10.8 G/DL — LOW (ref 11.5–15.5)
IMM GRANULOCYTES NFR BLD AUTO: 0.4 % — SIGNIFICANT CHANGE UP (ref 0–1.5)
LYMPHOCYTES # BLD AUTO: 0.93 K/UL — LOW (ref 1–3.3)
LYMPHOCYTES # BLD AUTO: 13.1 % — SIGNIFICANT CHANGE UP (ref 13–44)
MAGNESIUM SERPL-MCNC: 2.1 MG/DL — SIGNIFICANT CHANGE UP (ref 1.6–2.6)
MCHC RBC-ENTMCNC: 25.4 PG — LOW (ref 27–34)
MCHC RBC-ENTMCNC: 31.2 GM/DL — LOW (ref 32–36)
MCV RBC AUTO: 81.4 FL — SIGNIFICANT CHANGE UP (ref 80–100)
MONOCYTES # BLD AUTO: 0.85 K/UL — SIGNIFICANT CHANGE UP (ref 0–0.9)
MONOCYTES NFR BLD AUTO: 11.9 % — SIGNIFICANT CHANGE UP (ref 2–14)
NEUTROPHILS # BLD AUTO: 4.99 K/UL — SIGNIFICANT CHANGE UP (ref 1.8–7.4)
NEUTROPHILS NFR BLD AUTO: 70.1 % — SIGNIFICANT CHANGE UP (ref 43–77)
NT-PROBNP SERPL-SCNC: 5002 PG/ML — HIGH (ref 0–300)
PLATELET # BLD AUTO: 286 K/UL — SIGNIFICANT CHANGE UP (ref 150–400)
POTASSIUM SERPL-MCNC: 4.6 MMOL/L — SIGNIFICANT CHANGE UP (ref 3.5–5.3)
POTASSIUM SERPL-SCNC: 4.6 MMOL/L — SIGNIFICANT CHANGE UP (ref 3.5–5.3)
PROT SERPL-MCNC: 6.9 G/DL — SIGNIFICANT CHANGE UP (ref 6.6–8.7)
RBC # BLD: 4.25 M/UL — SIGNIFICANT CHANGE UP (ref 3.8–5.2)
RBC # FLD: 16 % — HIGH (ref 10.3–14.5)
SODIUM SERPL-SCNC: 141 MMOL/L — SIGNIFICANT CHANGE UP (ref 135–145)
WBC # BLD: 7.12 K/UL — SIGNIFICANT CHANGE UP (ref 3.8–10.5)
WBC # FLD AUTO: 7.12 K/UL — SIGNIFICANT CHANGE UP (ref 3.8–10.5)

## 2022-02-07 PROCEDURE — 83036 HEMOGLOBIN GLYCOSYLATED A1C: CPT

## 2022-02-07 PROCEDURE — C1769: CPT

## 2022-02-07 PROCEDURE — 83735 ASSAY OF MAGNESIUM: CPT

## 2022-02-07 PROCEDURE — 93010 ELECTROCARDIOGRAM REPORT: CPT

## 2022-02-07 PROCEDURE — 83880 ASSAY OF NATRIURETIC PEPTIDE: CPT

## 2022-02-07 PROCEDURE — C1887: CPT

## 2022-02-07 PROCEDURE — 36415 COLL VENOUS BLD VENIPUNCTURE: CPT

## 2022-02-07 PROCEDURE — 93458 L HRT ARTERY/VENTRICLE ANGIO: CPT

## 2022-02-07 PROCEDURE — 93005 ELECTROCARDIOGRAM TRACING: CPT

## 2022-02-07 PROCEDURE — C1894: CPT

## 2022-02-07 PROCEDURE — 99152 MOD SED SAME PHYS/QHP 5/>YRS: CPT

## 2022-02-07 PROCEDURE — 80053 COMPREHEN METABOLIC PANEL: CPT

## 2022-02-07 PROCEDURE — 85025 COMPLETE CBC W/AUTO DIFF WBC: CPT

## 2022-02-07 RX ORDER — TRAMADOL HYDROCHLORIDE 50 MG/1
1 TABLET ORAL
Qty: 0 | Refills: 0 | DISCHARGE

## 2022-02-07 RX ORDER — EMPAGLIFLOZIN 10 MG/1
0 TABLET, FILM COATED ORAL
Qty: 0 | Refills: 0 | DISCHARGE

## 2022-02-07 RX ORDER — SACUBITRIL AND VALSARTAN 24; 26 MG/1; MG/1
1 TABLET, FILM COATED ORAL
Qty: 0 | Refills: 0 | DISCHARGE

## 2022-02-07 RX ORDER — LEVOTHYROXINE SODIUM 125 MCG
1 TABLET ORAL
Qty: 0 | Refills: 0 | DISCHARGE

## 2022-02-07 RX ORDER — SODIUM CHLORIDE 9 MG/ML
100 INJECTION INTRAMUSCULAR; INTRAVENOUS; SUBCUTANEOUS
Refills: 0 | Status: DISCONTINUED | OUTPATIENT
Start: 2022-02-07 | End: 2022-02-21

## 2022-02-07 RX ORDER — ASPIRIN/CALCIUM CARB/MAGNESIUM 324 MG
81 TABLET ORAL DAILY
Refills: 0 | Status: DISCONTINUED | OUTPATIENT
Start: 2022-02-07 | End: 2022-02-21

## 2022-02-07 RX ADMIN — Medication 81 MILLIGRAM(S): at 12:21

## 2022-02-07 NOTE — ASU PATIENT PROFILE, ADULT - FALL HARM RISK - HARM RISK INTERVENTIONS

## 2022-02-07 NOTE — DISCHARGE NOTE NURSING/CASE MANAGEMENT/SOCIAL WORK - NSDCPEFALRISK_GEN_ALL_CORE
For information on Fall & Injury Prevention, visit: [email or web address] OR  [email or web address] OR  https://www.cdc.gov/steadi/patient.html

## 2022-02-07 NOTE — DISCHARGE NOTE PROVIDER - NSDCFUADDINST_GEN_ALL_CORE_FT
Go to the ED with any acute onset of chest pain, palpitations, shortness of breath or dizziness.  Managing risk factors will help prevent future blockages, risk factors may include: high blood pressure, high cholesterol, obesity, sedentary life style and smoking.   Your diet should be low in fat, cholesterol, salt and carbohydrates, increase fruits (caution if diabetic), vegetables and whole grains/fiber rich foods.  Take all your cardiac  medications as prescribed.   No heavy lifting, driving, sex, tub baths, swimming, or any activity that submerges the lower half of the body in water for 48 hours.  Limited walking and stairs for 48 hours.   Change the bandaid after 24 hours and every 24 hours after that.  Keep the puncture site dry and covered with a bandaid until a scab forms.   Observe the site frequently.  If bleeding or a large lump (the size of a golf ball or bigger) occurs lie flat, apply continuous direct pressure just above the puncture site for at least 10 minutes, and notify your physician immediately.  If the bleeding cannot be controlled, call 911 immediately for assistance.  Notify your physician of pain, swelling or any drainage.   Notify your physician immediately if coldness, numbness, discoloration or pain in your foot occurs.  Exercise is a very important factor in heart health. Once your post procedure restrictions have passed, you should engage in heart healthy, aerobic exercise. Be sure to have clearance from your cardiologist. Cardiac rehab programs could be extremely beneficial and your cardiologist could help set this up.  Follow up with your cardiologist within 1-2 weeks after your procedure.  Call your cardiologist or our unit ([phone]) with any questions or concerns that may arise.

## 2022-02-07 NOTE — DISCHARGE NOTE PROVIDER - CARE PROVIDER_API CALL
aTj Hernandez (DO)  Internal Medicine  [address] [place] [address]  Phone: [phone]  Fax: [phone]  Follow Up Time:

## 2022-02-07 NOTE — DISCHARGE NOTE PROVIDER - NSDCCPCAREPLAN_GEN_ALL_CORE_FT
PRINCIPAL DISCHARGE DIAGNOSIS  Diagnosis: Shortness of breath  Assessment and Plan of Treatment: The cardiac catheterization revealed a patent stent and has ruled out significant progression of coronary artery disease as the cause of your recent shortness of breath and need for hospitalization. These symptoms were likely related to your weakened heart muscle. You need to cont to take your medications as prescribed and manage risk factors such as high cholesterol, high blood pressure, smoking, diabetes, obesity and sedentary life style, and follow a heart healthy diet and exercise. This will prevent future significant coronary disease.      SECONDARY DISCHARGE DIAGNOSES  Diagnosis: Chronic systolic congestive heart failure  Assessment and Plan of Treatment: Take your medications as prescribed. Monitor for symptoms of heart failure: increasing shortness of breath, inability to lie flat, swelling in your legs, increased abdomiinal girth or bloating, any significant increase in weight, increasing dizziness or fatigue and notify your provider.    Diagnosis: HTN (hypertension)  Assessment and Plan of Treatment: Continue to take antihypertensive medications as prescribed. Obtain a home blood pressure monitor (at any pharmacy or medical supply store) and monitor blood pressure trends. Call your doctor if you note you blood pressure to be running much higher or lower than usual. Limit salt intake.    Diagnosis: HLD (hyperlipidemia)  Assessment and Plan of Treatment: Your diet should be low in fat, cholesterol, salt and carbohydrates, increase fruits (caution if diabetic), vegetables and whole grains/fiber rich foods. Take your cholesterol lowering medications as prescribed. Routine follow up lipid panels    Diagnosis: COPD (chronic obstructive pulmonary disease)  Assessment and Plan of Treatment: Notify your doctor/pulmonologist of any increased shortness of breath, cough, wheezing or change in pulmonary status/lung capacity. Take your inhalers/meds for COPD as prescribed.    Diagnosis: Stage 3 chronic kidney disease  Assessment and Plan of Treatment: Monitor urine output and notify your doctor of any changes. Routine follow up to monitor your kidney function. Avoid excessive use of ibuprofen, NSAIDs and other medications that can impact your kidney function where appropriate. Ensure good hydration before and after any dye/contrast studies and have follow up labs after these types of studies.

## 2022-02-07 NOTE — DISCHARGE NOTE PROVIDER - HOSPITAL COURSE
77 yo female with a PMHx of HTN, HLD, hypothyroidism, ex-smoker, COPD, lung and stomach cancer treated with chemo and hip cancer treated with XRT, remission of cancers X 2years, ICM with LVEF range 25-35% since 2019, CAD/NSTEMI 2/2019 s/p LHC with thrombotic 100% culprit lesion pLCx and mRCA , underwent OSMAR to pLCX with PETRONA 3 flow post, CKD 3, recently hospitalized (1/3/22) at [facility] with ADHF, BNP 4169, tnl leak to 0.11, no acute ischemic change on ECG, TTE with mod-sev MR, LVEF of 25-30%, refused repeat LHC at that time, medical management optimized and life vest initiated upon discharge, now returns for scheduled LHC to be performed by Dr. Avila.     -plan for LHC via Ra vs FA  -patient seen and examined  -confirmed appropriate NPO duration  -ECG and Labs reviewed  -Aspirin 81mg and Brilinta 90mg pre-cath  -procedure discussed with patient; risks and benefits explained, questions answered  -consent obtained by attending IC    Now s/p LHC via RFA with patent intervention site pLCx, know  mRCA and no other CAD progression identified, procedure performed by Dr. Avila, received IV sedation intraprocedurally, arrived to recovery in NAD and HDS, RFA sheath removed and manual pressure by CHELLE Pisano, access site stable, no bleed/hematoma, distal pulse +, plan for D/C home after post cath recovery.     Plan:  -Formal cath report pending  -Post procedure management/monitoring per protocol  -Access site precautions  -Bedrest x 3hours post sheath removal and achievement of hemostasis  -Continue current medical therapy  -Cont dual anti platelet therapy with aspirin/brilinta  -Cont BB with Toprol 25mg po daily   -Cont statin therapy with Lipitor 40mg po qHS   -Cont lasix 40mg po BID  -Cont Entresto 24/26mg po q12hr  -Cont Jardiance for cardioprotection  -Educated regarding strict adherence with DAPT   -Educated regarding post procedure management and care  -Discussed the importance of RF modification  -Continued compliance with Life vest  -Plan for repeat TTE and likely future ICD to be coordinated  -F/U outpt in 1-2 weeks with Cardiologist Dr. Adams  -DISPO: Plan for D/C after post cath recovery

## 2022-02-07 NOTE — DISCHARGE NOTE PROVIDER - NSDCMRMEDTOKEN_GEN_ALL_CORE_FT
aspirin 81 mg oral delayed release tablet: 1 tab(s) orally once a day  atorvastatin 40 mg oral tablet: 1 tab(s) orally once a day (at bedtime)  Entresto 24 mg-26 mg oral tablet: 1 tab(s) orally 2 times a day  furosemide 40 mg oral tablet: 1 tab(s) orally 2 times a day  ipratropium-albuterol 0.5 mg-2.5 mg/3 mLinhalation solution: 3 milliliter(s) by nebulizer 4 times a day as needed SOB  Jardiance: orally once a day  levothyroxine 200 mcg (0.2 mg)/mL oral solution: 1 milliliter(s) orally once a day  metoprolol succinate 25 mg oral capsule, extended release: 0.5 cap(s) orally once a day   mirtazapine 15 mg oral tablet: 1 tab(s) orally once a day (at bedtime), As Needed  for insomnia   ticagrelor 90 mg oral tablet: 1 tab(s) orally 2 times a day  tiotropium 18 mcg inhalation capsule: 1 cap(s) inhaled once a day  traMADol 50 mg oral tablet: 1 tab(s) orally 2 times a day

## 2022-02-07 NOTE — DISCHARGE NOTE PROVIDER - NSDCCPTREATMENT_GEN_ALL_CORE_FT
PRINCIPAL PROCEDURE  Procedure: Left heart cardiac cath  Findings and Treatment: Go to the ED with any acute onset of chest pain, palpitations, shortness of breath or dizziness.   Managing risk factors will help prevent future blockages, risk factors may include: high blood pressure, high cholesterol, obesity, sedentary life style and smoking.    Your diet should be low in fat, cholesterol, salt and carbohydrates, increase fruits (caution if diabetic), vegetables and whole grains/fiber rich foods.   Take all your cardiac  medications as prescribed.    No heavy lifting, driving, sex, tub baths, swimming, or any activity that submerges the lower half of the body in water for 48 hours.  Limited walking and stairs for 48 hours.    Change the bandaid after 24 hours and every 24 hours after that.  Keep the puncture site dry and covered with a bandaid until a scab forms.    Observe the site frequently.  If bleeding or a large lump (the size of a golf ball or bigger) occurs lie flat, apply continuous direct pressure just above the puncture site for at least 10 minutes, and notify your physician immediately.  If the bleeding cannot be controlled, call 911 immediately for assistance.  Notify your physician of pain, swelling or any drainage.    Notify your physician immediately if coldness, numbness, discoloration or pain in your foot occurs.  Exercise is a very important factor in heart health. Once your post procedure restrictions have passed, you should engage in heart healthy, aerobic exercise. Be sure to have clearance from your cardiologist. Cardiac rehab programs could be extremely beneficial and your cardiologist could help set this up.   Follow up with your cardiologist within 1-2 weeks after your procedure.   Call your cardiologist or our unit ([phone]) with any questions or concerns that may arise.

## 2022-02-07 NOTE — PROGRESS NOTE ADULT - SUBJECTIVE AND OBJECTIVE BOX
I have seen and examined this patient. There is no change since the last H& P. Consent obtained. Agree with cardiac cath. Risks and benefits fully explained. All questions answered.    Hipolito Avila MD

## 2022-02-07 NOTE — DISCHARGE NOTE NURSING/CASE MANAGEMENT/SOCIAL WORK - PATIENT PORTAL LINK FT
You can access the FollowMyHealth Patient Portal offered by [facility] by registering at the following website: [email or web address]. By joining [facility]’s FollowMyHealth portal, you will also be able to view your health information using other applications (apps) compatible with our system.

## 2022-02-08 DIAGNOSIS — I25.118 ATHEROSCLEROTIC HEART DISEASE OF NATIVE CORONARY ARTERY WITH OTHER FORMS OF ANGINA PECTORIS: ICD-10-CM

## 2022-06-22 NOTE — PROGRESS NOTE ADULT - ASSESSMENT
Assessment  NSTEMI EF 35%  s/p Cx PCI OSMAR   RCA  Kvng post cath  Mod MR EF 35%  Acute systolic HF HFrEF  O2 dep COPD  Met lung Ca  Htn      Rec  DC losartan  cont lasix  add low dose toprol XL 25 mg day  trend creat Xenograft Text: The defect edges were debeveled with a #15 scalpel blade.  Given the location of the defect, shape of the defect and the proximity to free margins a xenograft was deemed most appropriate.  The graft was then trimmed to fit the size of the defect.  The graft was then placed in the primary defect and oriented appropriately.

## 2022-08-24 RX ORDER — FUROSEMIDE 40 MG/1
40 TABLET ORAL
Qty: 60 | Refills: 0 | Status: DISCONTINUED | COMMUNITY
Start: 2021-09-01 | End: 2022-08-24

## 2022-09-20 ENCOUNTER — APPOINTMENT (OUTPATIENT)
Dept: NEPHROLOGY | Facility: CLINIC | Age: 77
End: 2022-09-20

## 2022-09-20 VITALS
BODY MASS INDEX: 28.51 KG/M2 | OXYGEN SATURATION: 93 % | HEIGHT: 61 IN | SYSTOLIC BLOOD PRESSURE: 124 MMHG | DIASTOLIC BLOOD PRESSURE: 72 MMHG | WEIGHT: 151 LBS | HEART RATE: 60 BPM

## 2022-09-20 PROCEDURE — 36415 COLL VENOUS BLD VENIPUNCTURE: CPT

## 2022-09-20 PROCEDURE — 99214 OFFICE O/P EST MOD 30 MIN: CPT | Mod: 25

## 2022-09-20 RX ORDER — SODIUM ZIRCONIUM CYCLOSILICATE 10 G/10G
10 POWDER, FOR SUSPENSION ORAL
Qty: 10 | Refills: 0 | Status: DISCONTINUED | COMMUNITY
Start: 2021-07-08 | End: 2022-09-20

## 2022-09-20 RX ORDER — TICAGRELOR 90 MG/1
90 TABLET ORAL TWICE DAILY
Refills: 0 | Status: DISCONTINUED | COMMUNITY
Start: 2019-12-11 | End: 2022-09-20

## 2022-09-20 NOTE — PHYSICAL EXAM
[General Appearance - Alert] : alert [General Appearance - In No Acute Distress] : in no acute distress [Sclera] : the sclera and conjunctiva were normal [Hearing Threshold Finger Rub Not [place]] : hearing was normal [Examination Of The Oral Cavity] : the lips and gums were normal [Oropharynx] : the oropharynx was normal [Neck Appearance] : the appearance of the neck was normal [Neck Cervical Mass (___cm)] : no neck mass was observed [Jugular Venous Distention Increased] : there was no jugular-venous distention [Thyroid Diffuse Enlargement] : the thyroid was not enlarged [Auscultation Breath Sounds / Voice Sounds] : lungs were clear to auscultation bilaterally [Heart Sounds] : normal S1 and S2 [Full Pulse] : the pedal pulses are present [Edema] : there was no peripheral edema [Abdomen Soft] : soft [Abdomen Tenderness] : non-tender [No CVA Tenderness] : no ~M costovertebral angle tenderness [No Spinal Tenderness] : no spinal tenderness [Abnormal Walk] : normal gait [Nail Clubbing] : no clubbing  or cyanosis of the fingernails [Musculoskeletal - Swelling] : no joint swelling seen [Motor Tone] : muscle strength and tone were normal [] : no rash [Sensation] : the sensory exam was normal to light touch and pinprick [Motor Exam] : the motor exam was normal [No Focal Deficits] : no focal deficits [Oriented To Time, Place, And Person] : oriented to person, place, and time [Impaired Insight] : insight and judgment were intact [Affect] : the affect was normal [Mood] : the mood was normal [Clear Bilaterally] : the lungs were clear to auscultation bilaterally [Normal to Percussion] : the lungs were normal to percussion [Normal] : palpation of the chest was normal [Normal Rate] : normal [S4] : no S4 [No Murmur] : no murmurs heard

## 2022-09-20 NOTE — ASSESSMENT
[FreeTextEntry1] : \par \par 1. EDER on CKD, NOS vs CKD 4\par Etio EDER if present : hypercalcemia + ARB (entresto)\par Scr on most recent labs--> 2.7\par Renal US with b/l cysts, non obstructing calculus\par Will w/ u Hypercalcemia\par \par 2. HTN: bp stable\par Continue metoprolol, entresto\par \par 3. Metastatic lung cancer s/p Opdivo (now off for more than a year)\par in remission\par Onc follow up\par \par 4. Hypercalcemia, elevated PTH\par ? PHPT  , met related\par ? needs repeat dose of prolia\par \par f/u in 2 months\par repeat labs prior to appt

## 2022-09-20 NOTE — HISTORY OF PRESENT ILLNESS
[FreeTextEntry1] : 76 year old woman with lung ca , bone meds, s/p Opdivo (last dose in ? 2021), Anemia, Anxiety, ASHD, CKD 3, HTN, HLD, CHFrEF (30%) here for F/U of her CKD\par \par Patient reports no health related adverse event since last clinic visit. \par NO ER/Hospitalization/urgent care visit. \par Denies any cardiac or urinary complaints, follows cardiologist on entresto recently got AICD/PPM, 1DES\par No dysuria, gross hematuria, SOB, LUTS.\par Reports BP has been well controlled. \par \par \par

## 2022-09-26 LAB
25(OH)D3 SERPL-MCNC: 57.9 NG/ML
ALBUMIN SERPL ELPH-MCNC: 4.2 G/DL
ALP BLD-CCNC: 87 U/L
ALT SERPL-CCNC: 12 U/L
ANION GAP SERPL CALC-SCNC: 12 MMOL/L
AST SERPL-CCNC: 16 U/L
BILIRUB SERPL-MCNC: 1.2 MG/DL
BUN SERPL-MCNC: 23 MG/DL
CALCIUM SERPL-MCNC: 10.6 MG/DL
CALCIUM SERPL-MCNC: 10.6 MG/DL
CHLORIDE SERPL-SCNC: 108 MMOL/L
CO2 SERPL-SCNC: 22 MMOL/L
CREAT SERPL-MCNC: 1.27 MG/DL
EGFR: 44 ML/MIN/1.73M2
GLUCOSE SERPL-MCNC: 95 MG/DL
PARATHYROID HORMONE INTACT: 69 PG/ML
POTASSIUM SERPL-SCNC: 4.7 MMOL/L
PROT SERPL-MCNC: 6.4 G/DL
SODIUM SERPL-SCNC: 142 MMOL/L

## 2022-09-28 ENCOUNTER — NON-APPOINTMENT (OUTPATIENT)
Age: 77
End: 2022-09-28

## 2022-11-22 ENCOUNTER — NON-APPOINTMENT (OUTPATIENT)
Age: 77
End: 2022-11-22

## 2022-11-22 ENCOUNTER — APPOINTMENT (OUTPATIENT)
Dept: NEPHROLOGY | Facility: CLINIC | Age: 77
End: 2022-11-22

## 2022-11-22 VITALS
HEART RATE: 69 BPM | DIASTOLIC BLOOD PRESSURE: 70 MMHG | OXYGEN SATURATION: 93 % | HEIGHT: 61 IN | WEIGHT: 148 LBS | BODY MASS INDEX: 27.94 KG/M2 | SYSTOLIC BLOOD PRESSURE: 138 MMHG

## 2022-11-22 PROCEDURE — 99213 OFFICE O/P EST LOW 20 MIN: CPT

## 2022-11-22 NOTE — PHYSICAL EXAM
[General Appearance - Alert] : alert [General Appearance - In No Acute Distress] : in no acute distress [Sclera] : the sclera and conjunctiva were normal [Hearing Threshold Finger Rub Not [place]] : hearing was normal [Examination Of The Oral Cavity] : the lips and gums were normal [Oropharynx] : the oropharynx was normal [Neck Appearance] : the appearance of the neck was normal [Neck Cervical Mass (___cm)] : no neck mass was observed [Jugular Venous Distention Increased] : there was no jugular-venous distention [Thyroid Diffuse Enlargement] : the thyroid was not enlarged [Auscultation Breath Sounds / Voice Sounds] : lungs were clear to auscultation bilaterally [Heart Sounds] : normal S1 and S2 [Full Pulse] : the pedal pulses are present [Edema] : there was no peripheral edema [Abdomen Soft] : soft [Abdomen Tenderness] : non-tender [No CVA Tenderness] : no ~M costovertebral angle tenderness [No Spinal Tenderness] : no spinal tenderness [Abnormal Walk] : normal gait [Nail Clubbing] : no clubbing  or cyanosis of the fingernails [Musculoskeletal - Swelling] : no joint swelling seen [Motor Tone] : muscle strength and tone were normal [] : no rash [Sensation] : the sensory exam was normal to light touch and pinprick [Motor Exam] : the motor exam was normal [No Focal Deficits] : no focal deficits [Oriented To Time, Place, And Person] : oriented to person, place, and time [Impaired Insight] : insight and judgment were intact [Affect] : the affect was normal [Mood] : the mood was normal [Clear Bilaterally] : the lungs were clear to auscultation bilaterally [Normal to Percussion] : the lungs were normal to percussion [Normal] : palpation of the chest was normal [Normal Rate] : normal [S4] : no S4 [No Murmur] : no murmurs heard

## 2022-11-22 NOTE — HISTORY OF PRESENT ILLNESS
[FreeTextEntry1] : 77 year old woman with lung ca , bone meds, s/p Opdivo (last dose in ? 2021), Anemia, Anxiety, ASHD, CKD 3, HTN, HLD, CHFrEF (30%) here for F/U of her CKD\par \par Patient reports no health related adverse event since last clinic visit. \par NO ER/Hospitalization/urgent care visit. \par Denies any cardiac or urinary complaints, follows cardiologist on entresto recently got AICD/PPM, 1DES\par No dysuria, gross hematuria, SOB, LUTS.\par Reports BP has been well controlled. \par Since last visit attempted to get an appointment with an endocrinologist\par \par

## 2022-11-22 NOTE — ASSESSMENT
[FreeTextEntry1] : \par \par 1. CKD stage 3\par Scr on most recent labs--> 1.27 much improved\par Renal US with b/l cysts, non obstructing calculus\par BP controlled. C/w current medications.\par \par 2. HTN: bp stable\par Continue metoprolol, entresto\par \par 3. Metastatic lung cancer s/p Opdivo (now off for more than a year)\par in remission\par Onc follow up\par \par 4. 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 F/u appointment w/ endocrinology.\par \par f/u in 4-6 months

## 2023-02-06 NOTE — PROGRESS NOTE ADULT - PROBLEM SELECTOR PLAN 1
Cardiology following, continue Lasix, hold Entresto, monitor labs daily 5 Mild elevation in creatinine, monitor BMP. Lasix dose adjusted.

## 2023-03-08 ENCOUNTER — APPOINTMENT (OUTPATIENT)
Dept: ENDOCRINOLOGY | Facility: CLINIC | Age: 78
End: 2023-03-08
Payer: MEDICARE

## 2023-03-08 VITALS
SYSTOLIC BLOOD PRESSURE: 138 MMHG | WEIGHT: 170 LBS | HEART RATE: 78 BPM | BODY MASS INDEX: 32.1 KG/M2 | HEIGHT: 61 IN | DIASTOLIC BLOOD PRESSURE: 82 MMHG

## 2023-03-08 PROCEDURE — 99205 OFFICE O/P NEW HI 60 MIN: CPT

## 2023-03-08 RX ORDER — TIOTROPIUM BROMIDE 18 UG/1
18 CAPSULE ORAL; RESPIRATORY (INHALATION) DAILY
Qty: 1 | Refills: 3 | Status: DISCONTINUED | COMMUNITY
Start: 2022-01-11 | End: 2023-03-08

## 2023-03-08 NOTE — REASON FOR VISIT
[Initial Evaluation] : an initial evaluation [Hypercalcemia] : hypercalcemia [Hypothyroidism] : hypothyroidism

## 2023-03-09 NOTE — ASSESSMENT
[FreeTextEntry1] : 77 year old woman with lung ca , bone mets, s/p Opdivo (last dose in ? 2021), Anemia, Anxiety, ASHD, CKD 3, HTN, HLD, CHFrEF.\par Here for consultation for evaluation of  hypothyroidism, hypercalcemia.\par  accompanying her today.\par Per patient she was diagnosed with hypothyroidism many years ago.\par Has been on replacement since then.\par Currently on levothyroxine 250 mcg daily.\par Dose increased few days ago from 200, her TSH was very high around 400.\par Admits to not being compliant with doses.\par \par Discussed with them that with this high TSH she is at risk of mexademic coma and might need few doses of IV levothyroxine.\par But she says she feels like her usual self, does not want to go to hospital.\par Admits to missing lots of doses, wants to take it regularly first.\par Recommended to go up on dose of levothyroxine to 300 mcg daily, repeat TFTs in 1 week to see change in FT4 and T3.\par In meantime if she  does not feel well to go to ER.\par Another blood work in 6 weeks and see our NP here.\par \par Hypercalcemia:\par Probably has primary hyperparathyroidism.\par Will check PTH, serum calcium,phosphorus, magnesium,  renal functions, Vitamin D .\par Will also get DXA scan in case not in last 2 years\par Will see if  patient qualifies for surgery.\par Good hydration.\par To start vitamin D 3, 1000 units daily.\par \par \par Will then send to surgeon for further evaluation.\par

## 2023-03-09 NOTE — HISTORY OF PRESENT ILLNESS
[FreeTextEntry1] : 77 year old woman with lung ca , bone mets, s/p Opdivo (last dose in ? 2021), Anemia, Anxiety, ASHD, CKD 3, HTN, HLD, CHFrEF.\par Here for consultation for evaluation of  hypothyroidism, hypercalcemia.\par  accompanying her today.\par Per patient she was diagnosed with hypothyroidism many years ago.\par Has been on replacement since then.\par Currently on levothyroxine 250 mcg daily.\par Dose increased few days ago from 200, her TSH was very high around 400.\par She says her levels have been fluctuating a lot.\par For this high level of TSH she feels fine, says her energy is same, sleeping a little bit more, but still active, does all home chores, some puffiness under eyes, but that’s not new.Feels cold all the time, that’s also not new.\par Mentation is fine, vitals in clinic good today. NO new constipation, no sig. skin or hair changes.\par Admits to missing half of the thyroid doses per week, says she was told to take in the middle of the night but at times she sleeps all night and misses the dose.\par Also has known h/o hypercalcemia for a few years, is being watched.\par No fractures , no kidney stones. Stopped all calcium and vitamin D.\par \par

## 2023-03-09 NOTE — REVIEW OF SYSTEMS
[Fatigue] : fatigue [Decreased Appetite] : appetite not decreased [Recent Weight Gain (___ Lbs)] : no recent weight gain [Recent Weight Loss (___ Lbs)] : no recent weight loss [Visual Field Defect] : no visual field defect [Dry Eyes] : no dryness [Chest Pain] : no chest pain [Palpitations] : no palpitations [Lower Ext Edema] : lower extremity edema [Shortness Of Breath] : no shortness of breath [Cough] : no cough [Nausea] : no nausea [Constipation] : no constipation [Abdominal Pain] : no abdominal pain [Vomiting] : no vomiting [Diarrhea] : no diarrhea [Polyuria] : no polyuria [Joint Pain] : no joint pain [Muscle Weakness] : no muscle weakness [Myalgia] : myalgia  [Acanthosis] : no acanthosis  [Acne] : no acne [Dry Skin] : no dry skin [Headaches] : no headaches [Dizziness] : no dizziness [Tremors] : no tremors [Depression] : no depression [Insomnia] : no insomnia [Anxiety] : no anxiety [Polydipsia] : no polydipsia [Cold Intolerance] : cold intolerance [Heat Intolerance] : no heat intolerance [Easy Bleeding] : no ~M tendency for easy bleeding [Easy Bruising] : no tendency for easy bruising [Swelling] : no swelling

## 2023-03-09 NOTE — PHYSICAL EXAM
[Alert] : alert [Well Nourished] : well nourished [No Acute Distress] : no acute distress [Normal Sclera/Conjunctiva] : normal sclera/conjunctiva [No Proptosis] : no proptosis [No Neck Mass] : no neck mass was observed [Thyroid Not Enlarged] : the thyroid was not enlarged [No Thyroid Nodules] : no palpable thyroid nodules [No Respiratory Distress] : no respiratory distress [No Accessory Muscle Use] : no accessory muscle use [Clear to Auscultation] : lungs were clear to auscultation bilaterally [Normal S1, S2] : normal S1 and S2 [Normal Rate] : heart rate was normal [Regular Rhythm] : with a regular rhythm [Normal Bowel Sounds] : normal bowel sounds [Not Tender] : non-tender [Soft] : abdomen soft [No Rash] : no rash [No Tremors] : no tremors [Oriented x3] : oriented to person, place, and time [Normal Affect] : the affect was normal [Normal Mood] : the mood was normal [de-identified] : bruising on arms

## 2023-03-27 ENCOUNTER — NON-APPOINTMENT (OUTPATIENT)
Age: 78
End: 2023-03-27

## 2023-04-04 ENCOUNTER — APPOINTMENT (OUTPATIENT)
Dept: NEPHROLOGY | Facility: CLINIC | Age: 78
End: 2023-04-04
Payer: MEDICARE

## 2023-04-04 VITALS
DIASTOLIC BLOOD PRESSURE: 80 MMHG | HEART RATE: 72 BPM | HEIGHT: 61 IN | SYSTOLIC BLOOD PRESSURE: 132 MMHG | OXYGEN SATURATION: 96 % | WEIGHT: 161 LBS | BODY MASS INDEX: 30.4 KG/M2

## 2023-04-04 PROCEDURE — 99213 OFFICE O/P EST LOW 20 MIN: CPT

## 2023-04-04 RX ORDER — SACUBITRIL AND VALSARTAN 24; 26 MG/1; MG/1
24-26 TABLET, FILM COATED ORAL TWICE DAILY
Qty: 28 | Refills: 0 | Status: DISCONTINUED | COMMUNITY
Start: 2022-01-11 | End: 2023-04-04

## 2023-04-14 NOTE — PHYSICAL EXAM
[General Appearance - Alert] : alert [General Appearance - In No Acute Distress] : in no acute distress [Sclera] : the sclera and conjunctiva were normal [Hearing Threshold Finger Rub Not [place]] : hearing was normal [Examination Of The Oral Cavity] : the lips and gums were normal [Oropharynx] : the oropharynx was normal [Neck Appearance] : the appearance of the neck was normal [Neck Cervical Mass (___cm)] : no neck mass was observed [Jugular Venous Distention Increased] : there was no jugular-venous distention [Thyroid Diffuse Enlargement] : the thyroid was not enlarged [Auscultation Breath Sounds / Voice Sounds] : lungs were clear to auscultation bilaterally [Heart Sounds] : normal S1 and S2 [Full Pulse] : the pedal pulses are present [Edema] : there was no peripheral edema [Abdomen Soft] : soft [Abdomen Tenderness] : non-tender [No CVA Tenderness] : no ~M costovertebral angle tenderness [No Spinal Tenderness] : no spinal tenderness [Abnormal Walk] : normal gait [Nail Clubbing] : no clubbing  or cyanosis of the fingernails [Musculoskeletal - Swelling] : no joint swelling seen [Motor Tone] : muscle strength and tone were normal [] : no rash [Sensation] : the sensory exam was normal to light touch and pinprick [Motor Exam] : the motor exam was normal [No Focal Deficits] : no focal deficits [Oriented To Time, Place, And Person] : oriented to person, place, and time [Impaired Insight] : insight and judgment were intact [Affect] : the affect was normal [Mood] : the mood was normal [Clear Bilaterally] : the lungs were clear to auscultation bilaterally [Normal to Percussion] : the lungs were normal to percussion [Normal] : palpation of the chest was normal [Normal Rate] : normal [No Murmur] : no murmurs heard [S4] : no S4

## 2023-04-14 NOTE — ASSESSMENT
[FreeTextEntry1] : 77 year old woman with lung ca , bone mets, s/p Opdivo (last dose in 2021), Anemia, Anxiety, ASHD, HTN, HLD, CHFrEF (30%) here for F/U of her CKD 3:\par \par 1. CKD stage 3\par Scr on most recent labs--> 1.44, baseline 1.25-1.5 mg/dl, GFR 38\par UA w/o significant proteinuria\par Renal US with b/l cysts, non obstructing calculi\par BP controlled. C/w current medications.\par \par 2. HTN: bp stable\par Continue metoprolol, entresto\par \par 3. Metastatic lung cancer s/p Opdivo (now off for more than a year) in remission\par Onc follow up\par \par 4. 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 F/u appointment w/ endocrinology.\par \par f/u in 4-6 months

## 2023-04-14 NOTE — HISTORY OF PRESENT ILLNESS
[FreeTextEntry1] : 77 year old woman with lung ca , bone mets, s/p Opdivo (last dose in 2021), Anemia, Anxiety, ASHD, HTN, HLD, CHFrEF (30%) here for F/U of her CKD 3\par \par Patient reports no health related adverse event since last clinic visit. \par NO ER/Hospitalization/urgent care visit. \par Denies any cardiac or urinary complaints, follows cardiologist on entresto, AICD/PPM, 1DES\par No dysuria, gross hematuria, SOB, LUTS.\par Reports BP has been well controlled. \par Since last visit attempted to get an appointment with an endocrinologist\par \par **No new complaints this time\par Started following Dr Rivers for hypothyroidism and hypercalcemia.\par \par

## 2023-04-19 ENCOUNTER — NON-APPOINTMENT (OUTPATIENT)
Age: 78
End: 2023-04-19

## 2023-04-20 ENCOUNTER — APPOINTMENT (OUTPATIENT)
Dept: ENDOCRINOLOGY | Facility: CLINIC | Age: 78
End: 2023-04-20

## 2023-04-24 NOTE — DIETITIAN INITIAL EVALUATION ADULT. - PT NOT SOURCE
[FreeTextEntry1] : CHINA WU is a 73 year old female here for a follow up visit.\par  on High flow [de-identified] : \par Zonia has a history of hypercholesterolemia, osteoarthritis (back/hip w/ R leg pain/neuropathy sxs), osteopenia, multiple thyroid nodules, GERD, and h/o depression. \par \par UTD on card eval and testing with Dr. Kelly in 2019 and all was wnl in the end ( nuclear stress test that was wnl, carotid US showed mild plaque, coronary CT score was only 9). \par \par Had EGD 2019 and showed HH and otherwise was wnl. Takes PPI med daily and even if tries to skip a day has breakthrough sxs so she will cont daily use for now (and ok to periodically test to see if she can get by with less than daily freq of use)\par \par Last thyroid US was 9/2021 and all is stable since 12/2016. We disc option to cont annual US vs. d/c further US and she prefers to cont to screen annually to keep an eye on nodule/calcifications\par \par She had routine gyn exam a few mos ago and requested a screening TVUS (no sxs). US showed 1.5 cm hyperechoic left ovarian cyst. Mildly elevated OVA 1 level but  wnl (18). She had pelvic MRI last week and results pending. She saw Dr. Sarabia re these findings and he advised that most likely this is a benign cyst that has been present for a long time (8 mm cyst seen on a 2002 pelvic MRI). He recommended repeat pelvic US and  in 3 mos, unless pelvic MRI results are suspicious, in which case surgery will be done at this time. She will f/u with Dr. Sarabia re pelvic MRI results (we revd in brief today but she will d/w him to get more detail/guidance) \par \par Angular chelitis/oral sores  resolved and then worsened again. She saw UC and had topical steroids and did not help. Saw Dr. Brennan and was given Nystatin oral rinses that cleared up the rash. Then had a biopsy of inner lip and showed Sjogren's. ssA and ss B Ab negative. Started on Plaquenil and helped for several months but then in past week had reappearance of oral lesion. Added back in OTC antifungal med in addition to the Plaquenil. Has not seen rheum yet but would be willing to. No symptoms of dry eyes\par \par Has appt with ophtho next week ([facility]) due to new dx of Sjogren's and use of Plaquenil; we revd needs regular eye exams on Plaquenil\par

## 2023-05-02 NOTE — PROGRESS NOTE ADULT - PROVIDER SPECIALTY LIST ADULT
Cardiology
Cardiology
Hospitalist
This was a shared visit with the GRETTA. I reviewed and verified the documentation and independently performed the documented:

## 2023-07-24 LAB — TSH SERPL-ACNC: 0.22

## 2023-07-25 ENCOUNTER — APPOINTMENT (OUTPATIENT)
Dept: ENDOCRINOLOGY | Facility: CLINIC | Age: 78
End: 2023-07-25
Payer: MEDICARE

## 2023-07-25 VITALS
SYSTOLIC BLOOD PRESSURE: 130 MMHG | DIASTOLIC BLOOD PRESSURE: 82 MMHG | HEIGHT: 61 IN | HEART RATE: 80 BPM | WEIGHT: 153 LBS | BODY MASS INDEX: 28.89 KG/M2

## 2023-07-25 PROCEDURE — 99214 OFFICE O/P EST MOD 30 MIN: CPT

## 2023-11-21 LAB — TSH SERPL-ACNC: 0.01

## 2023-11-22 ENCOUNTER — APPOINTMENT (OUTPATIENT)
Dept: ENDOCRINOLOGY | Facility: CLINIC | Age: 78
End: 2023-11-22
Payer: MEDICARE

## 2023-11-22 VITALS
HEIGHT: 61 IN | WEIGHT: 165 LBS | SYSTOLIC BLOOD PRESSURE: 148 MMHG | DIASTOLIC BLOOD PRESSURE: 84 MMHG | BODY MASS INDEX: 31.15 KG/M2 | HEART RATE: 67 BPM | OXYGEN SATURATION: 95 %

## 2023-11-22 PROCEDURE — 99214 OFFICE O/P EST MOD 30 MIN: CPT

## 2023-12-12 ENCOUNTER — APPOINTMENT (OUTPATIENT)
Dept: NEPHROLOGY | Facility: CLINIC | Age: 78
End: 2023-12-12

## 2024-02-27 RX ORDER — LEVOTHYROXINE SODIUM 0.03 MG/1
25 TABLET ORAL DAILY
Qty: 90 | Refills: 2 | Status: ACTIVE | COMMUNITY
Start: 2023-11-22 | End: 1900-01-01

## 2024-02-27 RX ORDER — LEVOTHYROXINE SODIUM 0.2 MG/1
200 TABLET ORAL DAILY
Qty: 90 | Refills: 1 | Status: ACTIVE | COMMUNITY
Start: 2019-12-11 | End: 1900-01-01

## 2024-03-08 LAB — TSH SERPL-ACNC: 0.04

## 2024-03-11 NOTE — ED ADULT TRIAGE NOTE - WEIGHT IN LBS
164.9 . No children. No cigarets, alcohol use, nor illicit drug use. 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

## 2024-04-01 NOTE — PATIENT PROFILE ADULT - DO YOU FEEL UNSAFE AT HOME, WORK, OR SCHOOL?
Too soon for refill.  90 day sent 3/4/24.    Venecia CAMPOVERDE RN, BSN  [facility]    
no

## 2024-04-02 NOTE — ED ADULT TRIAGE NOTE - NS ED TRIAGE HISTORIAN
I was present for and supervised the key portions of the procedure. -Bel Hugo MD (Attending) Patient

## 2024-05-01 ENCOUNTER — APPOINTMENT (OUTPATIENT)
Dept: ENDOCRINOLOGY | Facility: CLINIC | Age: 79
End: 2024-05-01
Payer: MEDICARE

## 2024-05-01 VITALS
HEIGHT: 61 IN | WEIGHT: 165 LBS | DIASTOLIC BLOOD PRESSURE: 76 MMHG | HEART RATE: 61 BPM | BODY MASS INDEX: 31.15 KG/M2 | SYSTOLIC BLOOD PRESSURE: 134 MMHG

## 2024-05-01 DIAGNOSIS — E83.52 HYPERCALCEMIA: ICD-10-CM

## 2024-05-01 DIAGNOSIS — E03.9 HYPOTHYROIDISM, UNSPECIFIED: ICD-10-CM

## 2024-05-01 PROCEDURE — 99214 OFFICE O/P EST MOD 30 MIN: CPT

## 2024-05-01 NOTE — HISTORY OF PRESENT ILLNESS
[FreeTextEntry1] : 79 year old woman with lung ca , bone mets, s/p Opdivo (last dose in ? 2021), Anemia, Anxiety, ASHD, CKD 3, HTN, HLD, CHFrEF. Here for follow up of  hypothyroidism, hypercalcemia.  accompanying her today.  Per patient she was diagnosed with hypothyroidism many years ago. Has been on replacement since then. Was on levothyroxine 300 mcg daily. Dose increased  from 200, her TSH was very high around 400 in 2/23. But she admitted to missing her doses a lot. Then TSH 0.01 in 11/23 so we back off on dose to 250 mcg daily. Then TSH 0.04 in 2/24 so we further backed off on dose to 225 mcg daily.   Here for follow up, now taking 225 mcg of levothyroxine daily, takes  it regularly. She says her energy is not bad, weight stable. Feels cold all the time, that's also not new. No new constipation, no sig. skin or hair changes. No plapitations, no shakiness. Also has known h/o hypercalcemia for a few years, is being watched. Recent calcium levels in normal range. No fractures , no kidney stones. Stopped all calcium , but restarted   vitamin D.

## 2024-05-01 NOTE — ASSESSMENT
[FreeTextEntry1] : 79 year old woman with lung ca , bone mets, s/p Opdivo (last dose in ? 2021), Anemia, Anxiety, ASHD, CKD 3, HTN, HLD, CHFrEF.  Here for follow up of hypothyroidism, hypercalcemia.   accompanying her today.  Per patient she was diagnosed with hypothyroidism many years ago.  Has been on replacement since then.  Was on levothyroxine 300 mcg daily. Dose increased from 200, her TSH was very high around 400 in 2/23. Then  TSH 0.01 in 11/23 so we back off on dose to 250 mcg daily.Then TSH 0.04 in 2/24 so we further backed off on dose to 225 mcg daily.  Will repeat levels  this visit and then further adjust doses as needed.  Discussed again with patient how to take thyroid medication appropriately,empty stomach ,  all Multi vitamins 4 to 6 hrs away form thyroid medication, he voiced understanding.    Hypercalcemia:  Probably has primary hyperparathyroidism.  Recent calcium 10.7 and  in 11/23, then recent calcium normal this visit. Will watch for now.  Will repeat PTH, serum calcium,phosphorus, magnesium, renal functions, Vitamin D next visit.  Will also get DXA scan in case not in last 2 years and then decide further management. Continue hydration and vitamin D3   RTC in 4 months .

## 2024-05-01 NOTE — REVIEW OF SYSTEMS
[Lower Ext Edema] : lower extremity edema [Myalgia] : myalgia  [Cold Intolerance] : cold intolerance [Fatigue] : fatigue [Decreased Appetite] : appetite not decreased [Recent Weight Gain (___ Lbs)] : no recent weight gain [Recent Weight Loss (___ Lbs)] : no recent weight loss [Visual Field Defect] : no visual field defect [Dry Eyes] : no dryness [Chest Pain] : no chest pain [Palpitations] : no palpitations [Shortness Of Breath] : no shortness of breath [Cough] : no cough [Nausea] : no nausea [Constipation] : no constipation [Abdominal Pain] : no abdominal pain [Vomiting] : no vomiting [Diarrhea] : no diarrhea [Polyuria] : no polyuria [Joint Pain] : no joint pain [Muscle Weakness] : no muscle weakness [Acanthosis] : no acanthosis  [Acne] : no acne [Dry Skin] : no dry skin [Headaches] : no headaches [Dizziness] : no dizziness [Tremors] : no tremors [Depression] : no depression [Insomnia] : no insomnia [Anxiety] : no anxiety [Polydipsia] : no polydipsia [Heat Intolerance] : no heat intolerance [Easy Bleeding] : no ~M tendency for easy bleeding [Easy Bruising] : no tendency for easy bruising [Swelling] : no swelling

## 2024-05-01 NOTE — PHYSICAL EXAM
[Alert] : alert [Well Nourished] : well nourished [No Acute Distress] : no acute distress [Normal Sclera/Conjunctiva] : normal sclera/conjunctiva [No Proptosis] : no proptosis [No Neck Mass] : no neck mass was observed [Thyroid Not Enlarged] : the thyroid was not enlarged [No Thyroid Nodules] : no palpable thyroid nodules [No Respiratory Distress] : no respiratory distress [No Accessory Muscle Use] : no accessory muscle use [Clear to Auscultation] : lungs were clear to auscultation bilaterally [Normal S1, S2] : normal S1 and S2 [Normal Rate] : heart rate was normal [Regular Rhythm] : with a regular rhythm [Oriented x3] : oriented to person, place, and time [Normal Affect] : the affect was normal [Normal Mood] : the mood was normal [de-identified] : bruising on arms

## 2024-05-20 NOTE — DISCHARGE NOTE ADULT - [id number]_ALL_CORE
Medication refill queued and pended. Prescriber please review, sign, and send if appropriate. Thank you.     
rehabilitation services

## 2024-06-01 NOTE — ED PROVIDER NOTE - DATE/TIME 1
Physical Therapy    Visit Type: treatment  SUBJECTIVE  Patient agreed to participate in therapy this date.  RN in agreement to work with patient for therapy session.  Patient verbally agrees to allow the following to be present during session: son  \"Is it this difficult for everyone\"(when fatigued after exercises).  Patient / Family Goal: return to previous functional status, maximize function and return home    Pain   Patient reports pain is not an issue/concern., RN informed on pain level.    At onset of session (out of 10): 0 (upon initiation of session, did not report on pain level but requested nurse be notified)     OBJECTIVE     Cognitive Status   Orientation    - Oriented to: person, place and time  Functional Communication   - Overall Status: within functional limits   - Forms of Communication: verbal  Attention Span    - Attention: intact  Following Direction   - follows all commands and directions consistently  Transition Between Tasks   - transitions without difficulty  Safety Awareness/Insight   - intact and good awareness of safety precautions  Awareness of Deficits   - fully aware of deficits    Posture:  - Shoulders: rounded  Spine: increased thoracic kyphosis  Cervical: forward head       Sitting Balance  (KAREL = base of support)  Static      - Trial 1 details: supervision    Standing Balance  (KAREL = base of support)  Firm Surface: Double Leg      - Static, Eyes Open       - Trial 1 details: contact guard and with double UE support (use of rolling walker)       Bed Mobility  - Supine to sit: supervision  - Sit to supine: supervision  Transfers  Assistive devices: gait belt, 2-wheeled walker  - Sit to stand: contact guard/touching/steadying assist, with verbal cues (cueing for hand placement and device management)  - Stand to sit: contact guard/touching/steadying assist, with verbal cues (cueing for hand placement and device management)    Ambulation / Gait  - Assistive device: gait belt and 2-wheeled  walker  - Distance (feet unless otherwise indicated): 150  frequent standing rest breaks due to poor endurance  - Assist Level: contact guard/touching/steadying assist  - Surface: even  - Description: antalgic, step to, decreased masha/pace and decreased stance time LLE     Interventions     Supine    Lower Extremity: quad sets, gluteus sets and ankle pumps (Performed seated at edge of bed), AROM, 10 reps, 1 sets  Seated    Lower Extremity: Right: knee flexion, AROM, 10 reps, Seated LE sets: 3.  Training provided: activity tolerance, balance retraining, bed mobility training, gait training, safety training, transfer training and use of assistive device    Skilled input: Verbal instruction/cues and tactile instruction/cues  Verbal Consent: Writer verbally educated and received verbal consent for hand placement, positioning of patient, and techniques to be performed today from patient          Education:   - Present and ready to learn: patient  Education provided during session:  - Education includes:   - physical therapy plan of care  - goals for session  - current impairments in the context of discharge planning  - Results of above outlined education: Verbalizes understanding and Demonstrates understanding    ASSESSMENT   Progress: progressing toward goals  Interferring components: decreased activity tolerance    Discharge needs based on today's assessment:   - Current level of function: slightly below baseline level of function   - Therapy needs at discharge: outpatient therapy 1-3 times per week   - Activities of daily living (ADLs) requiring support at discharge: bed mobility, transfers, ambulation and stairs   - Instrumental activities of daily living (IADLs) requiring support at discharge: community mobility and home management   - Impairments that require further therapy intervention: activity tolerance, balance, pain, ROM and strength    AM-PAC  - Generalized Prior Level of Function: IND/MOD I ([facility]  22-24)       Key: MOD A=moderate assistance, IND/MOD I=independent/modified independent  - Generalized Current Level of Function     - Current Mobility Score: 18       AM-PAC Scoring Key= >21 Modified Independent; 20-21 Supervision; 18-19 Minimal assist; 13-17 Moderate assist; 9-12 Max assist; <9 Total assist    Therapy Diagnosis:  Other Abnormalities of Gait and Mobility        PLAN (while hospitalized)  Suggestions for next session as indicated: A minimum of 8 minutes per session x 1 week in the acute setting.    PT Frequency: Twice per day      PT/OT ADL Equipment for Discharge: Owns shower  Agreement to plan and goals: patient agrees with goals and treatment plan        GOALS  Review Date: 5/30/2024  Long Term Goals: (to be met by time of discharge from hospital)  Sit to supine: Patient will complete sit to supine supervision.  Status: met   Supine to sit: Patient will complete supine to sit supervision.  Status: met   Sit to stand: Patient will complete sit to stand transfer with gait belt and 2-wheeled walker, supervision.   Status: progressing/ongoing  Stand to sit: Patient will complete stand to sit transfer with gait belt and 2-wheeled walker, supervision.   Status: progressing/ongoing  Ambulation (even): Patient will ambulate on even surface for 150 feet with gait belt and 2-wheeled walker, supervision.   Status: progressing/ongoing  3-4 steps: Patient will ambulate 3-4 steps with gait belt and least restrictive device contact guard or touching/steadying.   Status: met   Documented in the chart in the following areas: Assessment/Plan.      Patient at End of Session:   Location: in bed  Safety measures: bed rails x2, call light within reach and alarm system in place/re-engaged  Handoff to: nurse      Therapy procedure time and total treatment time can be found documented on the Time Entry flowsheet   06-Dec-2019 11:16

## 2024-06-10 DIAGNOSIS — Z13.820 ENCOUNTER FOR SCREENING FOR OSTEOPOROSIS: ICD-10-CM

## 2024-09-03 LAB — TSH SERPL-ACNC: 0.15

## 2024-09-04 ENCOUNTER — APPOINTMENT (OUTPATIENT)
Dept: ENDOCRINOLOGY | Facility: CLINIC | Age: 79
End: 2024-09-04

## 2024-10-03 LAB
LDLC SERPL DIRECT ASSAY-MCNC: 56
TSH SERPL-ACNC: 0.12

## 2024-10-04 ENCOUNTER — APPOINTMENT (OUTPATIENT)
Dept: ENDOCRINOLOGY | Facility: CLINIC | Age: 79
End: 2024-10-04
Payer: MEDICARE

## 2024-10-04 VITALS
OXYGEN SATURATION: 97 % | WEIGHT: 162 LBS | BODY MASS INDEX: 30.58 KG/M2 | HEIGHT: 61 IN | DIASTOLIC BLOOD PRESSURE: 70 MMHG | HEART RATE: 60 BPM | SYSTOLIC BLOOD PRESSURE: 130 MMHG

## 2024-10-04 DIAGNOSIS — E83.52 HYPERCALCEMIA: ICD-10-CM

## 2024-10-04 DIAGNOSIS — E03.9 HYPOTHYROIDISM, UNSPECIFIED: ICD-10-CM

## 2024-10-04 DIAGNOSIS — Z13.820 ENCOUNTER FOR SCREENING FOR OSTEOPOROSIS: ICD-10-CM

## 2024-10-04 PROCEDURE — 36415 COLL VENOUS BLD VENIPUNCTURE: CPT

## 2024-10-04 PROCEDURE — 99214 OFFICE O/P EST MOD 30 MIN: CPT

## 2024-10-04 PROCEDURE — G2211 COMPLEX E/M VISIT ADD ON: CPT

## 2024-10-04 NOTE — PHYSICAL EXAM
[Alert] : alert [Normal Sclera/Conjunctiva] : normal sclera/conjunctiva [Normal Hearing] : hearing was normal [No Neck Mass] : no neck mass was observed [Thyroid Not Enlarged] : the thyroid was not enlarged [No Respiratory Distress] : no respiratory distress [Clear to Auscultation] : lungs were clear to auscultation bilaterally [No Accessory Muscle Use] : no accessory muscle use [Normal S1, S2] : normal S1 and S2 [No Stigmata of Cushings Syndrome] : no stigmata of Cushings Syndrome [Normal Gait] : normal gait [Oriented x3] : oriented to person, place, and time

## 2024-10-04 NOTE — REVIEW OF SYSTEMS
[Fatigue] : no fatigue [Visual Field Defect] : no visual field defect [Dysphagia] : no dysphagia [Chest Pain] : no chest pain [Palpitations] : no palpitations [Shortness Of Breath] : no shortness of breath [Nausea] : no nausea [Vomiting] : no vomiting [Constipation] : no constipation [Diarrhea] : no diarrhea [Polyuria] : no polyuria [Polydipsia] : no polydipsia

## 2024-10-04 NOTE — ASSESSMENT
[Levothyroxine] : The patient was instructed to take Levothyroxine on an empty stomach, separate from vitamins, and wait at least 30 minutes before eating [FreeTextEntry1] : Hypothyroidism -For now continue LT4 175 mcg QD -Updated TFTs needed, will draw now and adjust if needed   Hypercalcemia  Probably has primary hyperparathyroidism. No recent Calcium or PTH level Check levels now  DEXA scan -No recent, none in chart  Send for DEXA scan   RTO in 4 months with MD

## 2024-10-04 NOTE — REASON FOR VISIT
[Follow - Up] : a follow-up visit [Hypercalcemia] : hypercalcemia [Hypothyroidism] : hypothyroidism [Spouse] : spouse

## 2024-10-04 NOTE — HISTORY OF PRESENT ILLNESS
[FreeTextEntry1] : 79 year old woman with lung ca , bone mets, s/p Opdivo (last dose in ? 2021), Anemia, Anxiety, ASHD, CKD 3, HTN, HLD, CHFrEF.  Hypothyroidism  Current Medications: LT4 175 mcg QD - lowered in June by PCP   Thyroid U/S:  said many years ago No nodules no records here   Current tft's: no recent   Weight: stable    Hypercalcemia: Not on ca supplements  does not take tums does not eat alot of dairy  Never had DEXA Declines any falls or h/o fractures

## 2024-10-07 ENCOUNTER — NON-APPOINTMENT (OUTPATIENT)
Age: 79
End: 2024-10-07

## 2024-10-08 LAB
24R-OH-CALCIDIOL SERPL-MCNC: 50 PG/ML
ALBUMIN SERPL ELPH-MCNC: 4.1 G/DL
ALP BLD-CCNC: 108 U/L
ALT SERPL-CCNC: 8 U/L
ANION GAP SERPL CALC-SCNC: 18 MMOL/L
AST SERPL-CCNC: 15 U/L
BILIRUB SERPL-MCNC: 0.7 MG/DL
BUN SERPL-MCNC: 43 MG/DL
CALCIUM SERPL-MCNC: 10.5 MG/DL
CALCIUM SERPL-MCNC: 10.5 MG/DL
CHLORIDE SERPL-SCNC: 104 MMOL/L
CO2 SERPL-SCNC: 15 MMOL/L
CREAT SERPL-MCNC: 1.68 MG/DL
EGFR: 31 ML/MIN/1.73M2
GLUCOSE SERPL-MCNC: 96 MG/DL
MAGNESIUM SERPL-MCNC: 2.2 MG/DL
PARATHYROID HORMONE INTACT: 132 PG/ML
PHOSPHATE SERPL-MCNC: 3.7 MG/DL
POTASSIUM SERPL-SCNC: 5.4 MMOL/L
PROT SERPL-MCNC: 7.2 G/DL
SODIUM SERPL-SCNC: 137 MMOL/L
T3FREE SERPL-MCNC: 1.76 PG/ML
T4 FREE SERPL-MCNC: 1 NG/DL
TSH SERPL-ACNC: 0.76 UIU/ML

## 2025-03-19 ENCOUNTER — RX RENEWAL (OUTPATIENT)
Age: 80
End: 2025-03-19

## 2025-04-21 ENCOUNTER — APPOINTMENT (OUTPATIENT)
Dept: ENDOCRINOLOGY | Facility: CLINIC | Age: 80
End: 2025-04-21
Payer: MEDICARE

## 2025-04-21 VITALS
HEIGHT: 61 IN | OXYGEN SATURATION: 98 % | BODY MASS INDEX: 30.58 KG/M2 | DIASTOLIC BLOOD PRESSURE: 74 MMHG | HEART RATE: 113 BPM | WEIGHT: 162 LBS | SYSTOLIC BLOOD PRESSURE: 128 MMHG

## 2025-04-21 DIAGNOSIS — E83.52 HYPERCALCEMIA: ICD-10-CM

## 2025-04-21 DIAGNOSIS — Z13.820 ENCOUNTER FOR SCREENING FOR OSTEOPOROSIS: ICD-10-CM

## 2025-04-21 DIAGNOSIS — E03.9 HYPOTHYROIDISM, UNSPECIFIED: ICD-10-CM

## 2025-04-21 PROCEDURE — 99214 OFFICE O/P EST MOD 30 MIN: CPT

## 2025-04-21 RX ORDER — RIVAROXABAN 2.5 MG/1
TABLET, FILM COATED ORAL
Refills: 0 | Status: ACTIVE | COMMUNITY

## 2025-04-21 RX ORDER — ATORVASTATIN CALCIUM 40 MG/1
40 TABLET, FILM COATED ORAL
Refills: 0 | Status: ACTIVE | COMMUNITY

## 2025-04-21 RX ORDER — SERTRALINE HYDROCHLORIDE 100 MG/1
100 TABLET, FILM COATED ORAL
Refills: 0 | Status: ACTIVE | COMMUNITY

## 2025-04-21 RX ORDER — ALPRAZOLAM 0.25 MG/1
0.25 TABLET ORAL
Refills: 0 | Status: ACTIVE | COMMUNITY

## 2025-04-21 RX ORDER — EMPAGLIFLOZIN 10 MG/1
10 TABLET, FILM COATED ORAL
Refills: 0 | Status: ACTIVE | COMMUNITY

## 2025-04-21 RX ORDER — SACUBITRIL AND VALSARTAN 97; 103 MG/1; MG/1
97-103 TABLET, FILM COATED ORAL
Refills: 0 | Status: ACTIVE | COMMUNITY

## 2025-04-21 RX ORDER — FUROSEMIDE 20 MG/1
20 TABLET ORAL
Refills: 0 | Status: ACTIVE | COMMUNITY

## 2025-04-21 RX ORDER — SPIRONOLACTONE 25 MG/1
25 TABLET ORAL
Refills: 0 | Status: ACTIVE | COMMUNITY